# Patient Record
Sex: FEMALE | Race: WHITE | Employment: FULL TIME | URBAN - METROPOLITAN AREA
[De-identification: names, ages, dates, MRNs, and addresses within clinical notes are randomized per-mention and may not be internally consistent; named-entity substitution may affect disease eponyms.]

---

## 2017-01-05 ENCOUNTER — TELEPHONE (OUTPATIENT)
Dept: FAMILY MEDICINE CLINIC | Age: 42
End: 2017-01-05

## 2017-01-05 ENCOUNTER — OFFICE VISIT (OUTPATIENT)
Dept: FAMILY MEDICINE CLINIC | Age: 42
End: 2017-01-05

## 2017-01-05 VITALS
HEIGHT: 65 IN | DIASTOLIC BLOOD PRESSURE: 60 MMHG | HEART RATE: 64 BPM | WEIGHT: 231 LBS | BODY MASS INDEX: 38.49 KG/M2 | RESPIRATION RATE: 16 BRPM | SYSTOLIC BLOOD PRESSURE: 102 MMHG

## 2017-01-05 DIAGNOSIS — J20.9 ACUTE BRONCHITIS, UNSPECIFIED ORGANISM: ICD-10-CM

## 2017-01-05 DIAGNOSIS — J06.9 UPPER RESPIRATORY TRACT INFECTION, UNSPECIFIED TYPE: Primary | ICD-10-CM

## 2017-01-05 PROCEDURE — 99213 OFFICE O/P EST LOW 20 MIN: CPT | Performed by: FAMILY MEDICINE

## 2017-01-05 RX ORDER — BENZONATATE 100 MG/1
CAPSULE ORAL
Refills: 0 | COMMUNITY
Start: 2016-12-14 | End: 2017-04-07 | Stop reason: ALTCHOICE

## 2017-01-05 ASSESSMENT — ENCOUNTER SYMPTOMS
SINUS PRESSURE: 0
SHORTNESS OF BREATH: 1
COUGH: 1
DIARRHEA: 0
EYE DISCHARGE: 0
RHINORRHEA: 0
CONSTIPATION: 0
NAUSEA: 0

## 2017-01-31 DIAGNOSIS — E11.65 UNCONTROLLED TYPE 2 DIABETES MELLITUS WITH HYPERGLYCEMIA, WITHOUT LONG-TERM CURRENT USE OF INSULIN (HCC): ICD-10-CM

## 2017-02-03 RX ORDER — GLIMEPIRIDE 2 MG/1
2 TABLET ORAL EVERY MORNING
Qty: 30 TABLET | Refills: 3 | OUTPATIENT
Start: 2017-02-03

## 2017-04-05 ENCOUNTER — NURSE TRIAGE (OUTPATIENT)
Dept: ADMINISTRATIVE | Age: 42
End: 2017-04-05

## 2017-04-06 ENCOUNTER — TELEPHONE (OUTPATIENT)
Dept: FAMILY MEDICINE CLINIC | Age: 42
End: 2017-04-06

## 2017-04-07 ENCOUNTER — TELEPHONE (OUTPATIENT)
Dept: CARDIOLOGY | Age: 42
End: 2017-04-07

## 2017-04-07 ENCOUNTER — OFFICE VISIT (OUTPATIENT)
Dept: FAMILY MEDICINE CLINIC | Age: 42
End: 2017-04-07

## 2017-04-07 VITALS
HEIGHT: 65 IN | HEART RATE: 60 BPM | WEIGHT: 228.6 LBS | SYSTOLIC BLOOD PRESSURE: 106 MMHG | BODY MASS INDEX: 38.09 KG/M2 | DIASTOLIC BLOOD PRESSURE: 68 MMHG | RESPIRATION RATE: 16 BRPM

## 2017-04-07 DIAGNOSIS — R56.9 SEIZURES (HCC): ICD-10-CM

## 2017-04-07 DIAGNOSIS — E89.0 HYPOTHYROIDISM, POSTSURGICAL: ICD-10-CM

## 2017-04-07 DIAGNOSIS — R00.1 BRADYCARDIA: Primary | ICD-10-CM

## 2017-04-07 PROCEDURE — 93000 ELECTROCARDIOGRAM COMPLETE: CPT | Performed by: FAMILY MEDICINE

## 2017-04-07 PROCEDURE — 99215 OFFICE O/P EST HI 40 MIN: CPT | Performed by: FAMILY MEDICINE

## 2017-04-07 ASSESSMENT — ENCOUNTER SYMPTOMS
COUGH: 0
EYES NEGATIVE: 1
CONSTIPATION: 0
CHEST TIGHTNESS: 0
NAUSEA: 0
SHORTNESS OF BREATH: 0
DIARRHEA: 0
VOMITING: 0
ABDOMINAL PAIN: 0

## 2017-04-10 ENCOUNTER — OFFICE VISIT (OUTPATIENT)
Dept: CARDIOLOGY | Age: 42
End: 2017-04-10

## 2017-04-10 VITALS
SYSTOLIC BLOOD PRESSURE: 120 MMHG | DIASTOLIC BLOOD PRESSURE: 88 MMHG | HEART RATE: 72 BPM | WEIGHT: 224 LBS | HEIGHT: 65 IN | BODY MASS INDEX: 37.32 KG/M2

## 2017-04-10 DIAGNOSIS — R55 VASOVAGAL SYNCOPE: ICD-10-CM

## 2017-04-10 DIAGNOSIS — I49.1 PAC (PREMATURE ATRIAL CONTRACTION): Primary | ICD-10-CM

## 2017-04-10 PROCEDURE — 99213 OFFICE O/P EST LOW 20 MIN: CPT | Performed by: INTERNAL MEDICINE

## 2017-04-10 RX ORDER — NICOTINE 21 MG/24HR
1 PATCH, TRANSDERMAL 24 HOURS TRANSDERMAL EVERY 24 HOURS
Qty: 30 PATCH | Refills: 3 | Status: CANCELLED | OUTPATIENT
Start: 2017-04-10

## 2017-04-10 RX ORDER — NICOTINE 21 MG/24HR
1 PATCH, TRANSDERMAL 24 HOURS TRANSDERMAL EVERY 24 HOURS
COMMUNITY
End: 2017-04-17 | Stop reason: SDUPTHER

## 2017-04-17 ENCOUNTER — OFFICE VISIT (OUTPATIENT)
Dept: FAMILY MEDICINE CLINIC | Age: 42
End: 2017-04-17

## 2017-04-17 VITALS
WEIGHT: 227.5 LBS | DIASTOLIC BLOOD PRESSURE: 70 MMHG | HEART RATE: 64 BPM | SYSTOLIC BLOOD PRESSURE: 108 MMHG | BODY MASS INDEX: 37.9 KG/M2 | RESPIRATION RATE: 16 BRPM | HEIGHT: 65 IN

## 2017-04-17 DIAGNOSIS — F17.200 TOBACCO DEPENDENCE: Primary | ICD-10-CM

## 2017-04-17 PROCEDURE — 99213 OFFICE O/P EST LOW 20 MIN: CPT | Performed by: FAMILY MEDICINE

## 2017-04-17 RX ORDER — NICOTINE 21 MG/24HR
1 PATCH, TRANSDERMAL 24 HOURS TRANSDERMAL EVERY 24 HOURS
Qty: 30 PATCH | Refills: 0 | Status: SHIPPED | OUTPATIENT
Start: 2017-04-17 | End: 2017-08-03

## 2017-04-17 ASSESSMENT — ENCOUNTER SYMPTOMS
CONSTIPATION: 0
SINUS PRESSURE: 0
SHORTNESS OF BREATH: 0

## 2017-04-27 ENCOUNTER — TELEPHONE (OUTPATIENT)
Dept: FAMILY MEDICINE CLINIC | Age: 42
End: 2017-04-27

## 2017-04-27 ENCOUNTER — TELEPHONE (OUTPATIENT)
Dept: CARDIOLOGY | Age: 42
End: 2017-04-27

## 2017-05-01 ENCOUNTER — TELEPHONE (OUTPATIENT)
Dept: CARDIOLOGY | Age: 42
End: 2017-05-01

## 2017-05-04 ENCOUNTER — PROCEDURE VISIT (OUTPATIENT)
Dept: CARDIOLOGY | Age: 42
End: 2017-05-04

## 2017-05-04 DIAGNOSIS — Z45.09 ENCOUNTER FOR ELECTRONIC ANALYSIS OF REVEAL EVENT RECORDER: Primary | ICD-10-CM

## 2017-05-04 PROCEDURE — 93285 PRGRMG DEV EVAL SCRMS IP: CPT | Performed by: INTERNAL MEDICINE

## 2017-05-19 ENCOUNTER — OFFICE VISIT (OUTPATIENT)
Dept: FAMILY MEDICINE CLINIC | Age: 42
End: 2017-05-19

## 2017-05-19 VITALS
HEART RATE: 60 BPM | HEIGHT: 65 IN | WEIGHT: 227.5 LBS | DIASTOLIC BLOOD PRESSURE: 76 MMHG | BODY MASS INDEX: 37.9 KG/M2 | SYSTOLIC BLOOD PRESSURE: 122 MMHG | RESPIRATION RATE: 14 BRPM

## 2017-05-19 DIAGNOSIS — G89.29 CHRONIC PAIN OF LEFT KNEE: ICD-10-CM

## 2017-05-19 DIAGNOSIS — R07.89 COSTOCHONDRAL CHEST PAIN: Primary | ICD-10-CM

## 2017-05-19 DIAGNOSIS — M25.562 CHRONIC PAIN OF LEFT KNEE: ICD-10-CM

## 2017-05-19 PROCEDURE — 99214 OFFICE O/P EST MOD 30 MIN: CPT | Performed by: FAMILY MEDICINE

## 2017-05-19 ASSESSMENT — ENCOUNTER SYMPTOMS
SINUS PRESSURE: 0
CONSTIPATION: 0
SHORTNESS OF BREATH: 1

## 2017-06-08 ENCOUNTER — PROCEDURE VISIT (OUTPATIENT)
Dept: CARDIOLOGY | Age: 42
End: 2017-06-08

## 2017-06-08 DIAGNOSIS — Z45.09 ENCOUNTER FOR ELECTRONIC ANALYSIS OF REVEAL EVENT RECORDER: Primary | ICD-10-CM

## 2017-06-08 PROCEDURE — 93298 REM INTERROG DEV EVAL SCRMS: CPT | Performed by: INTERNAL MEDICINE

## 2017-06-13 ENCOUNTER — OFFICE VISIT (OUTPATIENT)
Dept: FAMILY MEDICINE CLINIC | Age: 42
End: 2017-06-13

## 2017-06-13 VITALS
HEIGHT: 65 IN | DIASTOLIC BLOOD PRESSURE: 66 MMHG | HEART RATE: 64 BPM | BODY MASS INDEX: 38.38 KG/M2 | RESPIRATION RATE: 12 BRPM | WEIGHT: 230.38 LBS | SYSTOLIC BLOOD PRESSURE: 112 MMHG

## 2017-06-13 DIAGNOSIS — R07.89 CHEST WALL PAIN: Primary | ICD-10-CM

## 2017-06-13 DIAGNOSIS — I87.2 VENOUS INSUFFICIENCY: ICD-10-CM

## 2017-06-13 PROCEDURE — 99213 OFFICE O/P EST LOW 20 MIN: CPT | Performed by: FAMILY MEDICINE

## 2017-06-13 RX ORDER — IBUPROFEN 800 MG/1
TABLET ORAL
Refills: 0 | COMMUNITY
Start: 2017-05-30 | End: 2017-08-29 | Stop reason: SDUPTHER

## 2017-06-13 RX ORDER — PSYLLIUM HUSK 3.4 G/7G
POWDER ORAL
Refills: 0 | COMMUNITY
Start: 2017-05-30 | End: 2020-02-14

## 2017-06-13 RX ORDER — LIDOCAINE 50 MG/G
OINTMENT TOPICAL
Qty: 35 G | Refills: 1 | Status: SHIPPED | OUTPATIENT
Start: 2017-06-13 | End: 2017-08-14

## 2017-06-13 ASSESSMENT — ENCOUNTER SYMPTOMS
CHEST TIGHTNESS: 1
CONSTIPATION: 1
SHORTNESS OF BREATH: 0
SINUS PRESSURE: 0

## 2017-08-01 ENCOUNTER — PROCEDURE VISIT (OUTPATIENT)
Dept: CARDIOLOGY CLINIC | Age: 42
End: 2017-08-01
Payer: COMMERCIAL

## 2017-08-01 DIAGNOSIS — Z45.09 ENCOUNTER FOR ELECTRONIC ANALYSIS OF REVEAL EVENT RECORDER: Primary | ICD-10-CM

## 2017-08-01 PROCEDURE — 93298 REM INTERROG DEV EVAL SCRMS: CPT | Performed by: INTERNAL MEDICINE

## 2017-08-03 ENCOUNTER — TELEPHONE (OUTPATIENT)
Dept: FAMILY MEDICINE CLINIC | Age: 42
End: 2017-08-03

## 2017-08-03 ENCOUNTER — OFFICE VISIT (OUTPATIENT)
Dept: FAMILY MEDICINE CLINIC | Age: 42
End: 2017-08-03

## 2017-08-03 VITALS
HEIGHT: 65 IN | HEART RATE: 60 BPM | DIASTOLIC BLOOD PRESSURE: 64 MMHG | WEIGHT: 235 LBS | RESPIRATION RATE: 14 BRPM | BODY MASS INDEX: 39.15 KG/M2 | SYSTOLIC BLOOD PRESSURE: 112 MMHG

## 2017-08-03 DIAGNOSIS — K59.09 CHRONIC CONSTIPATION: ICD-10-CM

## 2017-08-03 DIAGNOSIS — R07.89 CHEST WALL PAIN: Primary | ICD-10-CM

## 2017-08-03 PROCEDURE — 99214 OFFICE O/P EST MOD 30 MIN: CPT | Performed by: FAMILY MEDICINE

## 2017-08-03 RX ORDER — METRONIDAZOLE 500 MG/1
TABLET ORAL
Refills: 0 | COMMUNITY
Start: 2017-08-03 | End: 2017-08-09

## 2017-08-03 ASSESSMENT — ENCOUNTER SYMPTOMS
VOMITING: 1
CONSTIPATION: 1
SHORTNESS OF BREATH: 1
SINUS PRESSURE: 0
CHEST TIGHTNESS: 1

## 2017-08-10 ENCOUNTER — TELEPHONE (OUTPATIENT)
Dept: FAMILY MEDICINE CLINIC | Age: 42
End: 2017-08-10

## 2017-08-10 ENCOUNTER — HOSPITAL ENCOUNTER (OUTPATIENT)
Age: 42
Discharge: HOME | End: 2017-08-10
Payer: COMMERCIAL

## 2017-08-10 ENCOUNTER — HOSPITAL ENCOUNTER (OUTPATIENT)
Dept: WOMENS IMAGING | Age: 42
Discharge: HOME | End: 2017-08-10
Payer: COMMERCIAL

## 2017-08-10 ENCOUNTER — HOSPITAL ENCOUNTER (OUTPATIENT)
Dept: GENERAL RADIOLOGY | Age: 42
Discharge: HOME | End: 2017-08-10
Payer: COMMERCIAL

## 2017-08-10 DIAGNOSIS — K59.09 CHRONIC CONSTIPATION: ICD-10-CM

## 2017-08-10 DIAGNOSIS — Z12.31 VISIT FOR SCREENING MAMMOGRAM: ICD-10-CM

## 2017-08-10 LAB
AMORPHOUS: ABNORMAL
BACTERIA: ABNORMAL
BILIRUBIN URINE: NEGATIVE
BLOOD, URINE: NEGATIVE
CASTS: ABNORMAL /LPF
CHARACTER, URINE: ABNORMAL
COLOR: YELLOW
CRYSTALS: ABNORMAL
EPITHELIAL CELLS, UA: ABNORMAL /HPF
GLUCOSE, URINE: >= 1000 MG/DL
HEPATITIS B SURFACE ANTIGEN: NEGATIVE
HEPATITIS C ANTIBODY: NEGATIVE
KETONES, URINE: NEGATIVE
LEUKOCYTE ESTERASE, URINE: NEGATIVE
MISCELLANEOUS LAB TEST RESULT: ABNORMAL
MISCELLANEOUS LAB TEST RESULT: ABNORMAL
MUCUS: ABNORMAL
NITRITE, URINE: NEGATIVE
PH UA: 6
PROTEIN UA: NEGATIVE MG/DL
RBC URINE: ABNORMAL /HPF
RENAL EPITHELIAL, UA: ABNORMAL
RPR: NONREACTIVE
SPECIFIC GRAVITY UA: 1.03 (ref 1–1.03)
T4 FREE: 0.76 NG/DL (ref 0.93–1.76)
TSH SERPL DL<=0.05 MIU/L-ACNC: 51.95 UIU/ML (ref 0.4–4.2)
UROBILINOGEN, URINE: 1 EU/DL
WBC UA: ABNORMAL /HPF
YEAST: ABNORMAL

## 2017-08-10 PROCEDURE — 81001 URINALYSIS AUTO W/SCOPE: CPT

## 2017-08-10 PROCEDURE — 84443 ASSAY THYROID STIM HORMONE: CPT

## 2017-08-10 PROCEDURE — 86803 HEPATITIS C AB TEST: CPT

## 2017-08-10 PROCEDURE — 36415 COLL VENOUS BLD VENIPUNCTURE: CPT

## 2017-08-10 PROCEDURE — 86694 HERPES SIMPLEX NES ANTBDY: CPT

## 2017-08-10 PROCEDURE — 74000 XR ABDOMEN LIMITED (KUB): CPT

## 2017-08-10 PROCEDURE — 86695 HERPES SIMPLEX TYPE 1 TEST: CPT

## 2017-08-10 PROCEDURE — 86592 SYPHILIS TEST NON-TREP QUAL: CPT

## 2017-08-10 PROCEDURE — 84439 ASSAY OF FREE THYROXINE: CPT

## 2017-08-10 PROCEDURE — 87340 HEPATITIS B SURFACE AG IA: CPT

## 2017-08-10 PROCEDURE — 77063 BREAST TOMOSYNTHESIS BI: CPT

## 2017-08-10 PROCEDURE — 86696 HERPES SIMPLEX TYPE 2 TEST: CPT

## 2017-08-11 ENCOUNTER — TELEPHONE (OUTPATIENT)
Dept: ENDOCRINOLOGY | Age: 42
End: 2017-08-11

## 2017-08-11 ENCOUNTER — TELEPHONE (OUTPATIENT)
Dept: FAMILY MEDICINE CLINIC | Age: 42
End: 2017-08-11

## 2017-08-11 LAB
HSV 1 GLYCOPROTEIN G AB IGG: 33.3 IV
HSV TYPE 2 GLYCOPROTEIN G-SPECIFIC AB, IGG: 17.5 IV

## 2017-08-11 RX ORDER — FLUCONAZOLE 150 MG/1
150 TABLET ORAL ONCE
Qty: 1 TABLET | Refills: 0 | Status: SHIPPED | OUTPATIENT
Start: 2017-08-11 | End: 2017-08-11

## 2017-08-12 LAB — HERPES TYPE 1/2 IGM COMBINED: 0.55 IV

## 2017-08-14 ENCOUNTER — OFFICE VISIT (OUTPATIENT)
Dept: ENDOCRINOLOGY | Age: 42
End: 2017-08-14
Payer: COMMERCIAL

## 2017-08-14 VITALS
HEIGHT: 65 IN | RESPIRATION RATE: 18 BRPM | DIASTOLIC BLOOD PRESSURE: 79 MMHG | WEIGHT: 228.5 LBS | HEART RATE: 71 BPM | BODY MASS INDEX: 38.07 KG/M2 | SYSTOLIC BLOOD PRESSURE: 120 MMHG

## 2017-08-14 DIAGNOSIS — E89.0 HYPOTHYROIDISM, POSTABLATIVE: ICD-10-CM

## 2017-08-14 DIAGNOSIS — C73 FOLLICULAR THYROID CANCER (HCC): Primary | ICD-10-CM

## 2017-08-14 DIAGNOSIS — E78.00 PURE HYPERCHOLESTEROLEMIA: ICD-10-CM

## 2017-08-14 DIAGNOSIS — E11.65 UNCONTROLLED TYPE 2 DIABETES MELLITUS WITH HYPERGLYCEMIA, WITHOUT LONG-TERM CURRENT USE OF INSULIN (HCC): ICD-10-CM

## 2017-08-14 PROCEDURE — 99215 OFFICE O/P EST HI 40 MIN: CPT | Performed by: INTERNAL MEDICINE

## 2017-08-14 RX ORDER — LEVOTHYROXINE SODIUM 0.03 MG/1
25 TABLET ORAL DAILY
Qty: 30 TABLET | Refills: 5 | Status: SHIPPED | OUTPATIENT
Start: 2017-08-14 | End: 2017-10-26 | Stop reason: DRUGHIGH

## 2017-08-14 RX ORDER — GLIMEPIRIDE 1 MG/1
1 TABLET ORAL 2 TIMES DAILY
Qty: 30 TABLET | Refills: 5 | Status: SHIPPED | OUTPATIENT
Start: 2017-08-14 | End: 2017-11-03

## 2017-08-14 RX ORDER — LEVOTHYROXINE SODIUM 0.12 MG/1
TABLET ORAL
Qty: 60 TABLET | Refills: 5 | Status: SHIPPED | OUTPATIENT
Start: 2017-08-14 | End: 2017-10-26 | Stop reason: SDUPTHER

## 2017-08-14 RX ORDER — CANAGLIFLOZIN 100 MG/1
TABLET, FILM COATED ORAL
Qty: 30 TABLET | Refills: 5 | Status: SHIPPED | OUTPATIENT
Start: 2017-08-14 | End: 2017-11-03

## 2017-08-14 ASSESSMENT — ENCOUNTER SYMPTOMS: BLURRED VISION: 1

## 2017-08-15 ENCOUNTER — OFFICE VISIT (OUTPATIENT)
Dept: PHYSICAL MEDICINE AND REHAB | Age: 42
End: 2017-08-15
Payer: COMMERCIAL

## 2017-08-15 VITALS
SYSTOLIC BLOOD PRESSURE: 115 MMHG | DIASTOLIC BLOOD PRESSURE: 77 MMHG | HEART RATE: 73 BPM | HEIGHT: 65 IN | BODY MASS INDEX: 38.69 KG/M2 | WEIGHT: 232.2 LBS

## 2017-08-15 DIAGNOSIS — G89.4 CHRONIC PAIN SYNDROME: ICD-10-CM

## 2017-08-15 DIAGNOSIS — R07.89 MID STERNAL CHEST PAIN: ICD-10-CM

## 2017-08-15 DIAGNOSIS — R07.89 COSTOCHONDRAL CHEST PAIN: Primary | ICD-10-CM

## 2017-08-15 DIAGNOSIS — M79.7 FIBROMYALGIA: ICD-10-CM

## 2017-08-15 DIAGNOSIS — R52 GENERALIZED PAIN: ICD-10-CM

## 2017-08-15 PROCEDURE — 99205 OFFICE O/P NEW HI 60 MIN: CPT | Performed by: NURSE PRACTITIONER

## 2017-08-15 ASSESSMENT — ENCOUNTER SYMPTOMS
SINUS PRESSURE: 0
COLOR CHANGE: 0
SORE THROAT: 0
BACK PAIN: 1
VOMITING: 0
CHEST TIGHTNESS: 0
ABDOMINAL PAIN: 0
EYE PAIN: 0
PHOTOPHOBIA: 0
CONSTIPATION: 0
DIARRHEA: 0
WHEEZING: 0
COUGH: 0
SHORTNESS OF BREATH: 0
NAUSEA: 0
RHINORRHEA: 0

## 2017-08-22 ENCOUNTER — TELEPHONE (OUTPATIENT)
Dept: PHARMACY | Age: 42
End: 2017-08-22

## 2017-08-23 ENCOUNTER — HOSPITAL ENCOUNTER (OUTPATIENT)
Age: 42
Discharge: HOME | End: 2017-08-23
Payer: COMMERCIAL

## 2017-08-23 ENCOUNTER — HOSPITAL ENCOUNTER (OUTPATIENT)
Dept: ULTRASOUND IMAGING | Age: 42
Discharge: HOME | End: 2017-08-23
Payer: COMMERCIAL

## 2017-08-23 ENCOUNTER — TELEPHONE (OUTPATIENT)
Dept: FAMILY MEDICINE CLINIC | Age: 42
End: 2017-08-23

## 2017-08-23 DIAGNOSIS — E78.00 PURE HYPERCHOLESTEROLEMIA: ICD-10-CM

## 2017-08-23 DIAGNOSIS — C73 FOLLICULAR THYROID CANCER (HCC): ICD-10-CM

## 2017-08-23 DIAGNOSIS — E11.65 UNCONTROLLED TYPE 2 DIABETES MELLITUS WITH HYPERGLYCEMIA, WITHOUT LONG-TERM CURRENT USE OF INSULIN (HCC): ICD-10-CM

## 2017-08-23 LAB
AVERAGE GLUCOSE: 204 MG/DL (ref 70–126)
CHOLESTEROL, TOTAL: 111 MG/DL (ref 100–199)
CREATININE, URINE: 152 MG/DL
HBA1C MFR BLD: 8.8 % (ref 4.4–6.4)
HDLC SERPL-MCNC: 48 MG/DL
LDL CHOLESTEROL CALCULATED: 52 MG/DL
MICROALBUMIN UR-MCNC: 4.77 MG/DL
MICROALBUMIN/CREAT UR-RTO: 31 MG/G (ref 0–30)
TRIGL SERPL-MCNC: 56 MG/DL (ref 0–199)

## 2017-08-23 PROCEDURE — 76536 US EXAM OF HEAD AND NECK: CPT

## 2017-08-23 PROCEDURE — 82043 UR ALBUMIN QUANTITATIVE: CPT

## 2017-08-23 PROCEDURE — 36415 COLL VENOUS BLD VENIPUNCTURE: CPT

## 2017-08-23 PROCEDURE — 80061 LIPID PANEL: CPT

## 2017-08-23 PROCEDURE — 83036 HEMOGLOBIN GLYCOSYLATED A1C: CPT

## 2017-08-28 ENCOUNTER — HOSPITAL ENCOUNTER (EMERGENCY)
Age: 42
Discharge: HOME | End: 2017-08-28
Payer: COMMERCIAL

## 2017-08-28 ENCOUNTER — APPOINTMENT (OUTPATIENT)
Dept: GENERAL RADIOLOGY | Age: 42
End: 2017-08-28
Payer: COMMERCIAL

## 2017-08-28 VITALS
HEART RATE: 73 BPM | HEIGHT: 65 IN | DIASTOLIC BLOOD PRESSURE: 82 MMHG | RESPIRATION RATE: 16 BRPM | TEMPERATURE: 97.9 F | WEIGHT: 225 LBS | OXYGEN SATURATION: 97 % | SYSTOLIC BLOOD PRESSURE: 109 MMHG | BODY MASS INDEX: 37.49 KG/M2

## 2017-08-28 DIAGNOSIS — R07.89 ATYPICAL CHEST PAIN: Primary | ICD-10-CM

## 2017-08-28 LAB
AMPHETAMINE+METHAMPHETAMINE URINE SCREEN: NEGATIVE
ANION GAP SERPL CALCULATED.3IONS-SCNC: 11 MEQ/L (ref 8–16)
BACTERIA: ABNORMAL
BARBITURATE QUANTITATIVE URINE: NEGATIVE
BASOPHILS # BLD: 0.4 %
BASOPHILS ABSOLUTE: 0 THOU/MM3 (ref 0–0.1)
BENZODIAZEPINE QUANTITATIVE URINE: NEGATIVE
BILIRUBIN URINE: NEGATIVE
BLOOD, URINE: NEGATIVE
BUN BLDV-MCNC: 12 MG/DL (ref 7–22)
CALCIUM SERPL-MCNC: 9.6 MG/DL (ref 8.5–10.5)
CANNABINOID QUANTITATIVE URINE: POSITIVE
CASTS: ABNORMAL /LPF
CHARACTER, URINE: CLEAR
CHLORIDE BLD-SCNC: 102 MEQ/L (ref 98–111)
CO2: 26 MEQ/L (ref 23–33)
COCAINE METABOLITE QUANTITATIVE URINE: NEGATIVE
COLOR: YELLOW
CREAT SERPL-MCNC: 0.5 MG/DL (ref 0.4–1.2)
CRYSTALS: ABNORMAL
EOSINOPHIL # BLD: 0.9 %
EOSINOPHILS ABSOLUTE: 0.1 THOU/MM3 (ref 0–0.4)
EPITHELIAL CELLS, UA: ABNORMAL /HPF
GFR SERPL CREATININE-BSD FRML MDRD: > 90 ML/MIN/1.73M2
GLUCOSE BLD-MCNC: 213 MG/DL (ref 70–108)
GLUCOSE, URINE: >= 1000 MG/DL
HCT VFR BLD CALC: 43.9 % (ref 37–47)
HEMOGLOBIN: 15.4 GM/DL (ref 12–16)
KETONES, URINE: NEGATIVE
LEUKOCYTE ESTERASE, URINE: NEGATIVE
LYMPHOCYTES # BLD: 35.3 %
LYMPHOCYTES ABSOLUTE: 2.8 THOU/MM3 (ref 1–4.8)
MCH RBC QN AUTO: 32.8 PG (ref 27–31)
MCHC RBC AUTO-ENTMCNC: 35 GM/DL (ref 33–37)
MCV RBC AUTO: 93.9 FL (ref 81–99)
MONOCYTES # BLD: 5.7 %
MONOCYTES ABSOLUTE: 0.4 THOU/MM3 (ref 0.4–1.3)
NITRITE, URINE: NEGATIVE
NUCLEATED RED BLOOD CELLS: 0 /100 WBC
OPIATES, URINE: NEGATIVE
OSMOLALITY CALCULATION: 283.7 MOSMOL/KG (ref 275–300)
OXYCODONE: NEGATIVE
PDW BLD-RTO: 14 % (ref 11.5–14.5)
PH UA: 7
PHENCYCLIDINE QUANTITATIVE URINE: NEGATIVE
PLATELET # BLD: 234 THOU/MM3 (ref 130–400)
PMV BLD AUTO: 8.4 MCM (ref 7.4–10.4)
POTASSIUM SERPL-SCNC: 4.1 MEQ/L (ref 3.5–5.2)
PRO-BNP: 146.6 PG/ML (ref 0–450)
PROTEIN UA: NEGATIVE MG/DL
RBC # BLD: 4.68 MILL/MM3 (ref 4.2–5.4)
RBC # BLD: NORMAL 10*6/UL
RBC URINE: ABNORMAL /HPF
SEG NEUTROPHILS: 57.7 %
SEGMENTED NEUTROPHILS ABSOLUTE COUNT: 4.5 THOU/MM3 (ref 1.8–7.7)
SODIUM BLD-SCNC: 139 MEQ/L (ref 135–145)
SPECIFIC GRAVITY UA: > 1.03 (ref 1–1.03)
TROPONIN T: < 0.01 NG/ML
UROBILINOGEN, URINE: 1 EU/DL
WBC # BLD: 7.8 THOU/MM3 (ref 4.8–10.8)
WBC UA: ABNORMAL /HPF

## 2017-08-28 PROCEDURE — 71020 XR CHEST STANDARD TWO VW: CPT

## 2017-08-28 PROCEDURE — 81001 URINALYSIS AUTO W/SCOPE: CPT

## 2017-08-28 PROCEDURE — 84484 ASSAY OF TROPONIN QUANT: CPT

## 2017-08-28 PROCEDURE — 80307 DRUG TEST PRSMV CHEM ANLYZR: CPT

## 2017-08-28 PROCEDURE — 93005 ELECTROCARDIOGRAM TRACING: CPT

## 2017-08-28 PROCEDURE — 36415 COLL VENOUS BLD VENIPUNCTURE: CPT

## 2017-08-28 PROCEDURE — 80048 BASIC METABOLIC PNL TOTAL CA: CPT

## 2017-08-28 PROCEDURE — 99285 EMERGENCY DEPT VISIT HI MDM: CPT

## 2017-08-28 PROCEDURE — 85025 COMPLETE CBC W/AUTO DIFF WBC: CPT

## 2017-08-28 PROCEDURE — 83880 ASSAY OF NATRIURETIC PEPTIDE: CPT

## 2017-08-28 ASSESSMENT — PAIN DESCRIPTION - LOCATION: LOCATION: CHEST

## 2017-08-28 ASSESSMENT — PAIN DESCRIPTION - PAIN TYPE: TYPE: ACUTE PAIN

## 2017-08-28 ASSESSMENT — PAIN DESCRIPTION - DESCRIPTORS: DESCRIPTORS: SHARP;STABBING

## 2017-08-28 ASSESSMENT — PAIN DESCRIPTION - FREQUENCY: FREQUENCY: CONTINUOUS

## 2017-08-28 ASSESSMENT — ENCOUNTER SYMPTOMS
BACK PAIN: 0
NAUSEA: 0
DIARRHEA: 0
EYE REDNESS: 0
VOICE CHANGE: 0
SINUS PRESSURE: 0
CONSTIPATION: 0
EYE PAIN: 0
WHEEZING: 0
SHORTNESS OF BREATH: 1
BLOOD IN STOOL: 0
RHINORRHEA: 0
TROUBLE SWALLOWING: 0
VOMITING: 0
ABDOMINAL PAIN: 0
PHOTOPHOBIA: 0
EYE ITCHING: 0
CHEST TIGHTNESS: 0
COUGH: 0
SORE THROAT: 0
CHOKING: 0
ABDOMINAL DISTENTION: 0
EYE DISCHARGE: 0

## 2017-08-28 ASSESSMENT — PAIN DESCRIPTION - ORIENTATION: ORIENTATION: LEFT

## 2017-08-28 ASSESSMENT — PAIN SCALES - GENERAL: PAINLEVEL_OUTOF10: 7

## 2017-08-29 ENCOUNTER — OFFICE VISIT (OUTPATIENT)
Dept: PHYSICAL MEDICINE AND REHAB | Age: 42
End: 2017-08-29
Payer: COMMERCIAL

## 2017-08-29 VITALS
WEIGHT: 225.09 LBS | BODY MASS INDEX: 37.5 KG/M2 | SYSTOLIC BLOOD PRESSURE: 131 MMHG | DIASTOLIC BLOOD PRESSURE: 95 MMHG | HEART RATE: 76 BPM | HEIGHT: 65 IN

## 2017-08-29 DIAGNOSIS — F41.9 ANXIETY: ICD-10-CM

## 2017-08-29 DIAGNOSIS — F32.A DEPRESSION, UNSPECIFIED DEPRESSION TYPE: ICD-10-CM

## 2017-08-29 DIAGNOSIS — G89.4 CHRONIC PAIN SYNDROME: ICD-10-CM

## 2017-08-29 DIAGNOSIS — R52 GENERALIZED PAIN: ICD-10-CM

## 2017-08-29 DIAGNOSIS — R07.89 MID STERNAL CHEST PAIN: ICD-10-CM

## 2017-08-29 DIAGNOSIS — R07.89 COSTOCHONDRAL CHEST PAIN: Primary | ICD-10-CM

## 2017-08-29 DIAGNOSIS — M79.7 FIBROMYALGIA: ICD-10-CM

## 2017-08-29 LAB
EKG ATRIAL RATE: 75 BPM
EKG P AXIS: 46 DEGREES
EKG P-R INTERVAL: 136 MS
EKG Q-T INTERVAL: 402 MS
EKG QRS DURATION: 96 MS
EKG QTC CALCULATION (BAZETT): 448 MS
EKG R AXIS: 56 DEGREES
EKG T AXIS: 47 DEGREES
EKG VENTRICULAR RATE: 75 BPM

## 2017-08-29 PROCEDURE — 99213 OFFICE O/P EST LOW 20 MIN: CPT | Performed by: NURSE PRACTITIONER

## 2017-08-29 RX ORDER — LIDOCAINE 50 MG/G
1 PATCH TOPICAL DAILY
Qty: 30 PATCH | Refills: 0 | Status: SHIPPED | OUTPATIENT
Start: 2017-08-29 | End: 2018-01-03

## 2017-08-29 RX ORDER — GABAPENTIN 300 MG/1
300 CAPSULE ORAL NIGHTLY
Qty: 30 CAPSULE | Refills: 0 | Status: SHIPPED | OUTPATIENT
Start: 2017-08-29 | End: 2018-06-21 | Stop reason: ALTCHOICE

## 2017-08-29 RX ORDER — IBUPROFEN 800 MG/1
800 TABLET ORAL EVERY 8 HOURS PRN
Qty: 90 TABLET | Refills: 0 | Status: SHIPPED | OUTPATIENT
Start: 2017-08-29 | End: 2018-01-03

## 2017-08-29 ASSESSMENT — ENCOUNTER SYMPTOMS: BACK PAIN: 1

## 2017-08-30 ENCOUNTER — HOSPITAL ENCOUNTER (OUTPATIENT)
Dept: NUCLEAR MEDICINE | Age: 42
Discharge: HOME | End: 2017-08-30
Payer: COMMERCIAL

## 2017-08-30 ENCOUNTER — HOSPITAL ENCOUNTER (OUTPATIENT)
Dept: CT IMAGING | Age: 42
Discharge: HOME | End: 2017-08-30
Payer: COMMERCIAL

## 2017-08-30 ENCOUNTER — HOSPITAL ENCOUNTER (OUTPATIENT)
Age: 42
Discharge: HOME | End: 2017-08-30
Payer: COMMERCIAL

## 2017-08-30 ENCOUNTER — TELEPHONE (OUTPATIENT)
Dept: FAMILY MEDICINE CLINIC | Age: 42
End: 2017-08-30

## 2017-08-30 ENCOUNTER — TELEPHONE (OUTPATIENT)
Dept: PHYSICAL MEDICINE AND REHAB | Age: 42
End: 2017-08-30

## 2017-08-30 DIAGNOSIS — R07.89 CHEST WALL PAIN: ICD-10-CM

## 2017-08-30 PROCEDURE — A9503 TC99M MEDRONATE: HCPCS | Performed by: FAMILY MEDICINE

## 2017-08-30 PROCEDURE — 78306 BONE IMAGING WHOLE BODY: CPT

## 2017-08-30 PROCEDURE — 3430000000 HC RX DIAGNOSTIC RADIOPHARMACEUTICAL: Performed by: FAMILY MEDICINE

## 2017-08-30 PROCEDURE — 71250 CT THORAX DX C-: CPT

## 2017-08-30 RX ORDER — TC 99M MEDRONATE 20 MG/10ML
25 INJECTION, POWDER, LYOPHILIZED, FOR SOLUTION INTRAVENOUS
Status: COMPLETED | OUTPATIENT
Start: 2017-08-30 | End: 2017-08-30

## 2017-08-30 RX ADMIN — Medication 22.8 MILLICURIE: at 11:20

## 2017-09-01 RX ORDER — TIZANIDINE 2 MG/1
2 TABLET ORAL 3 TIMES DAILY PRN
Qty: 90 TABLET | Refills: 0 | Status: SHIPPED | OUTPATIENT
Start: 2017-09-01 | End: 2018-01-09 | Stop reason: ALTCHOICE

## 2017-09-08 ENCOUNTER — PROCEDURE VISIT (OUTPATIENT)
Dept: CARDIOLOGY CLINIC | Age: 42
End: 2017-09-08
Payer: COMMERCIAL

## 2017-09-08 ENCOUNTER — HOSPITAL ENCOUNTER (OUTPATIENT)
Age: 42
Discharge: HOME | End: 2017-09-08
Payer: COMMERCIAL

## 2017-09-08 DIAGNOSIS — E89.0 HYPOTHYROIDISM, POSTABLATIVE: ICD-10-CM

## 2017-09-08 DIAGNOSIS — C73 FOLLICULAR THYROID CANCER (HCC): ICD-10-CM

## 2017-09-08 DIAGNOSIS — Z45.09 ENCOUNTER FOR ELECTRONIC ANALYSIS OF REVEAL EVENT RECORDER: Primary | ICD-10-CM

## 2017-09-08 LAB
T4 FREE: 2.54 NG/DL (ref 0.93–1.76)
TSH SERPL DL<=0.05 MIU/L-ACNC: 0.1 UIU/ML (ref 0.4–4.2)

## 2017-09-08 PROCEDURE — 84439 ASSAY OF FREE THYROXINE: CPT

## 2017-09-08 PROCEDURE — 84443 ASSAY THYROID STIM HORMONE: CPT

## 2017-09-08 PROCEDURE — 84432 ASSAY OF THYROGLOBULIN: CPT

## 2017-09-08 PROCEDURE — 36415 COLL VENOUS BLD VENIPUNCTURE: CPT

## 2017-09-08 PROCEDURE — 86800 THYROGLOBULIN ANTIBODY: CPT

## 2017-09-08 PROCEDURE — 93298 REM INTERROG DEV EVAL SCRMS: CPT | Performed by: INTERNAL MEDICINE

## 2017-09-09 LAB — THYROGLOBULIN: NORMAL

## 2017-09-12 ENCOUNTER — OFFICE VISIT (OUTPATIENT)
Dept: CARDIOLOGY CLINIC | Age: 42
End: 2017-09-12
Payer: COMMERCIAL

## 2017-09-12 VITALS
SYSTOLIC BLOOD PRESSURE: 118 MMHG | BODY MASS INDEX: 37.65 KG/M2 | WEIGHT: 226 LBS | HEIGHT: 65 IN | HEART RATE: 60 BPM | DIASTOLIC BLOOD PRESSURE: 62 MMHG

## 2017-09-12 DIAGNOSIS — M19.90 ARTHRITIS: ICD-10-CM

## 2017-09-12 DIAGNOSIS — E78.00 PURE HYPERCHOLESTEROLEMIA: Primary | ICD-10-CM

## 2017-09-12 DIAGNOSIS — R55 VASOVAGAL SYNCOPE: ICD-10-CM

## 2017-09-12 PROCEDURE — 99213 OFFICE O/P EST LOW 20 MIN: CPT | Performed by: INTERNAL MEDICINE

## 2017-09-12 RX ORDER — POTASSIUM CHLORIDE 20 MEQ/1
20 TABLET, EXTENDED RELEASE ORAL DAILY
COMMUNITY
End: 2017-09-12 | Stop reason: SDUPTHER

## 2017-09-12 RX ORDER — BUMETANIDE 1 MG/1
1 TABLET ORAL DAILY
Qty: 30 TABLET | Refills: 2 | Status: SHIPPED | OUTPATIENT
Start: 2017-09-12 | End: 2017-10-25

## 2017-09-12 RX ORDER — POTASSIUM CHLORIDE 20 MEQ/1
20 TABLET, EXTENDED RELEASE ORAL DAILY
Qty: 30 TABLET | Refills: 2 | Status: SHIPPED | OUTPATIENT
Start: 2017-09-12 | End: 2017-10-25

## 2017-09-12 RX ORDER — BUMETANIDE 1 MG/1
1 TABLET ORAL DAILY
COMMUNITY
End: 2017-09-12 | Stop reason: SDUPTHER

## 2017-10-02 ENCOUNTER — TELEPHONE (OUTPATIENT)
Dept: CARDIOLOGY CLINIC | Age: 42
End: 2017-10-02

## 2017-10-11 ENCOUNTER — TELEPHONE (OUTPATIENT)
Dept: CARDIOLOGY CLINIC | Age: 42
End: 2017-10-11

## 2017-10-11 ENCOUNTER — PROCEDURE VISIT (OUTPATIENT)
Dept: CARDIOLOGY CLINIC | Age: 42
End: 2017-10-11

## 2017-10-11 ENCOUNTER — PROCEDURE VISIT (OUTPATIENT)
Dept: CARDIOLOGY CLINIC | Age: 42
End: 2017-10-11
Payer: COMMERCIAL

## 2017-10-11 DIAGNOSIS — R00.1 BRADYCARDIA: ICD-10-CM

## 2017-10-11 DIAGNOSIS — I73.9 CLAUDICATION (HCC): Primary | ICD-10-CM

## 2017-10-11 DIAGNOSIS — E89.0 HYPOTHYROIDISM, POSTABLATIVE: ICD-10-CM

## 2017-10-11 DIAGNOSIS — I49.1 PAC (PREMATURE ATRIAL CONTRACTION): ICD-10-CM

## 2017-10-11 DIAGNOSIS — E78.00 PURE HYPERCHOLESTEROLEMIA: Primary | ICD-10-CM

## 2017-10-11 DIAGNOSIS — Z45.09 ENCOUNTER FOR ELECTRONIC ANALYSIS OF REVEAL EVENT RECORDER: Primary | ICD-10-CM

## 2017-10-11 PROCEDURE — 93298 REM INTERROG DEV EVAL SCRMS: CPT | Performed by: INTERNAL MEDICINE

## 2017-10-11 NOTE — PROGRESS NOTES
REVEAL BATTERY OK  1 SYMPTOM EPISODE ON 09/23/17  APPEARS TO BE SINUS WITH PAC'S AND PVC'S   NO EPISODES MARKED

## 2017-10-13 ENCOUNTER — HOSPITAL ENCOUNTER (OUTPATIENT)
Age: 42
Discharge: HOME | End: 2017-10-13
Payer: COMMERCIAL

## 2017-10-13 ENCOUNTER — TELEPHONE (OUTPATIENT)
Dept: CARDIOLOGY CLINIC | Age: 42
End: 2017-10-13

## 2017-10-13 DIAGNOSIS — E89.0 HYPOTHYROIDISM, POSTABLATIVE: ICD-10-CM

## 2017-10-13 DIAGNOSIS — M79.604 LEG PAIN, BILATERAL: ICD-10-CM

## 2017-10-13 DIAGNOSIS — I49.1 PAC (PREMATURE ATRIAL CONTRACTION): ICD-10-CM

## 2017-10-13 DIAGNOSIS — E78.00 PURE HYPERCHOLESTEROLEMIA: ICD-10-CM

## 2017-10-13 DIAGNOSIS — M79.605 LEG PAIN, BILATERAL: ICD-10-CM

## 2017-10-13 DIAGNOSIS — R00.1 BRADYCARDIA: ICD-10-CM

## 2017-10-13 DIAGNOSIS — M79.89 LEFT LEG SWELLING: Primary | ICD-10-CM

## 2017-10-13 LAB
ANION GAP SERPL CALCULATED.3IONS-SCNC: 13 MEQ/L (ref 8–16)
BUN BLDV-MCNC: 12 MG/DL (ref 7–22)
CALCIUM SERPL-MCNC: 9.8 MG/DL (ref 8.5–10.5)
CHLORIDE BLD-SCNC: 96 MEQ/L (ref 98–111)
CO2: 30 MEQ/L (ref 23–33)
CREAT SERPL-MCNC: 0.6 MG/DL (ref 0.4–1.2)
GFR SERPL CREATININE-BSD FRML MDRD: > 90 ML/MIN/1.73M2
GLUCOSE BLD-MCNC: 200 MG/DL (ref 70–108)
MAGNESIUM: 1.6 MG/DL (ref 1.6–2.4)
POTASSIUM SERPL-SCNC: 4.4 MEQ/L (ref 3.5–5.2)
SODIUM BLD-SCNC: 139 MEQ/L (ref 135–145)

## 2017-10-13 PROCEDURE — 83735 ASSAY OF MAGNESIUM: CPT

## 2017-10-13 PROCEDURE — 80048 BASIC METABOLIC PNL TOTAL CA: CPT

## 2017-10-13 PROCEDURE — 36415 COLL VENOUS BLD VENIPUNCTURE: CPT

## 2017-10-16 NOTE — TELEPHONE ENCOUNTER
Pt got her bmp and mg on Friday    10/13/2017  4:08 PM - Dick, [facility] Incoming Lab Results From [facility]     Component Results     Component Value Ref Range & Units Status Collected Lab   Magnesium 1.6  1.6 - 2.4 mg/dL Fi       10/13/2017  4:08 PM - Dick, [facility] Incoming Lab Results From [facility]     Component Results     Component Value Ref Range & Units Status Collected Lab   Sodium 139  135 - 145 meq/L Final 10/13/2017  2:41 PM  - [facility]   Potassium 4.4  3.5 - 5.2 meq/L Final 10/13/2017  2:41 PM  - [facility]   Chloride 96   98 - 111 meq/L Final 10/13/2017  2:41 PM  - [facility]   CO2 30  23 - 33 meq/L Final 10/13/2017  2:41 PM  - [facility]   Glucose 200   70 - 108 mg/dL Final 10/13/2017  2:41 PM  - [facility]   BUN 12  7 - 22 mg/dL Final 10/13/2017  2:41 PM  - [facility]   CREATININE 0.6  0.4 - 1.2 mg/dL Final 10/13/2017  2:41 PM  - [facility]   Calcium 9.8  8.5 - 10.5 mg/dL Final

## 2017-10-16 NOTE — TELEPHONE ENCOUNTER
Testing schedule for Nov 16,17 at 9:00 arrive at 8:30 [address]  Patient notified and voice understanding

## 2017-10-25 ENCOUNTER — OFFICE VISIT (OUTPATIENT)
Dept: PSYCHIATRY | Age: 42
End: 2017-10-25
Payer: COMMERCIAL

## 2017-10-25 DIAGNOSIS — F41.1 GAD (GENERALIZED ANXIETY DISORDER): ICD-10-CM

## 2017-10-25 DIAGNOSIS — F31.62 BIPOLAR DISORDER, CURRENT EPISODE MIXED, MODERATE (HCC): ICD-10-CM

## 2017-10-25 PROCEDURE — 90792 PSYCH DIAG EVAL W/MED SRVCS: CPT | Performed by: PSYCHIATRY & NEUROLOGY

## 2017-10-25 RX ORDER — LAMOTRIGINE 25 MG/1
TABLET ORAL
Qty: 42 TABLET | Refills: 0 | Status: SHIPPED | OUTPATIENT
Start: 2017-10-25 | End: 2017-11-20 | Stop reason: SDUPTHER
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
## 2017-10-26 ENCOUNTER — TELEPHONE (OUTPATIENT)
Dept: ENDOCRINOLOGY | Age: 42
End: 2017-10-26

## 2017-10-26 DIAGNOSIS — C73 FOLLICULAR THYROID CANCER (HCC): ICD-10-CM

## 2017-10-26 NOTE — TELEPHONE ENCOUNTER
----- Message from Quin Moreno MD sent at 10/21/2017 10:36 PM EDT -----  Reduce synthroid to 250 mcg daily.   Get ft4 and tsh in 1 month

## 2017-10-27 RX ORDER — LEVOTHYROXINE SODIUM 0.12 MG/1
TABLET ORAL
Qty: 60 TABLET | Refills: 5 | Status: SHIPPED | OUTPATIENT
Start: 2017-10-27 | End: 2018-01-09

## 2017-11-03 ENCOUNTER — OFFICE VISIT (OUTPATIENT)
Dept: FAMILY MEDICINE CLINIC | Age: 42
End: 2017-11-03

## 2017-11-03 VITALS
BODY MASS INDEX: 36.69 KG/M2 | SYSTOLIC BLOOD PRESSURE: 100 MMHG | WEIGHT: 220.25 LBS | DIASTOLIC BLOOD PRESSURE: 54 MMHG | RESPIRATION RATE: 16 BRPM | HEIGHT: 65 IN | HEART RATE: 64 BPM

## 2017-11-03 DIAGNOSIS — L73.9 FOLLICULITIS: Primary | ICD-10-CM

## 2017-11-03 PROCEDURE — 99213 OFFICE O/P EST LOW 20 MIN: CPT | Performed by: FAMILY MEDICINE

## 2017-11-03 RX ORDER — SULFAMETHOXAZOLE AND TRIMETHOPRIM 800; 160 MG/1; MG/1
1 TABLET ORAL 2 TIMES DAILY
Qty: 20 TABLET | Refills: 0 | Status: SHIPPED | OUTPATIENT
Start: 2017-11-03 | End: 2017-11-13

## 2017-11-03 RX ORDER — BUMETANIDE 1 MG/1
1 TABLET ORAL DAILY PRN
Refills: 0 | COMMUNITY
Start: 2017-10-26 | End: 2022-10-07

## 2017-11-03 RX ORDER — POTASSIUM CHLORIDE 20 MEQ/1
20 TABLET, EXTENDED RELEASE ORAL DAILY PRN
Refills: 0 | COMMUNITY
Start: 2017-10-26 | End: 2018-05-26

## 2017-11-03 ASSESSMENT — ENCOUNTER SYMPTOMS
SINUS PRESSURE: 0
SHORTNESS OF BREATH: 0
CONSTIPATION: 0
GASTROINTESTINAL NEGATIVE: 1

## 2017-11-20 ENCOUNTER — PROCEDURE VISIT (OUTPATIENT)
Dept: CARDIOLOGY CLINIC | Age: 42
End: 2017-11-20
Payer: COMMERCIAL

## 2017-11-20 DIAGNOSIS — Z45.09 ENCOUNTER FOR ELECTRONIC ANALYSIS OF REVEAL EVENT RECORDER: Primary | ICD-10-CM

## 2017-11-20 PROCEDURE — 93298 REM INTERROG DEV EVAL SCRMS: CPT | Performed by: INTERNAL MEDICINE

## 2017-11-20 RX ORDER — LAMOTRIGINE 25 MG/1
TABLET ORAL
Qty: 60 TABLET | Refills: 0 | Status: SHIPPED | OUTPATIENT
Start: 2017-11-20 | End: 2017-12-11

## 2017-11-27 ENCOUNTER — TELEPHONE (OUTPATIENT)
Dept: CARDIOLOGY CLINIC | Age: 42
End: 2017-11-27

## 2017-12-11 ENCOUNTER — OFFICE VISIT (OUTPATIENT)
Dept: PSYCHIATRY | Age: 42
End: 2017-12-11
Payer: COMMERCIAL

## 2017-12-11 DIAGNOSIS — F41.1 GAD (GENERALIZED ANXIETY DISORDER): ICD-10-CM

## 2017-12-11 DIAGNOSIS — F31.62 BIPOLAR DISORDER, CURRENT EPISODE MIXED, MODERATE (HCC): Primary | ICD-10-CM

## 2017-12-11 PROCEDURE — 99213 OFFICE O/P EST LOW 20 MIN: CPT | Performed by: PSYCHIATRY & NEUROLOGY

## 2017-12-11 RX ORDER — LAMOTRIGINE 200 MG/1
TABLET ORAL
Qty: 30 TABLET | Refills: 3 | Status: SHIPPED | OUTPATIENT
Start: 2017-12-11 | End: 2018-04-30 | Stop reason: SDUPTHER

## 2017-12-11 NOTE — PROGRESS NOTES
Chief Complaint   Patient presents with    Follow-up     6 wks Bipolar MARIXA Cannabis     Hope returned today after six weeks for follow-up of bipolar disorder and generalized anxiety. She continues on lamotrigine, but at 50 mg daily. She called in and obtained a refill for that size rather than advancing the dose. We'll go ahead now and increase her to 100 mg for a week, and then 200 mg thereafter. I told her she could use her 25 mg tablets four at a time to make the 100 if she wishes. I did prescribe the 200 mg tablets for her. She does note that she's not having as many suicidal thoughts. However she remains irritable and is having trouble with being somewhat labile. For as low as the dose is I think we are seeing some benefit. I'm hopeful that most of the symptoms will come under control. It may be that we will had have to add something for the panic. We will determine that later. Mental Status Examination    Level of consciousness:  within normal limits  Appearance:  well-appearing, street clothes, in chair, good grooming and good hygiene  Behavior/Motor:  no abnormalities noted  Attitude toward examiner:  cooperative, attentive and good eye contact  Speech:  spontaneous, normal rate, normal volume and well articulated  Mood:  mild depression, irritable  Affect:  mood congruent  Thought processes:  linear, goal directed and coherent  Thought content:  Homocidal ideation: none  Suicidal Ideation:  denies suicidal ideation  Delusions:  no evidence of delusions  Perceptual Disturbance:  denies any perceptual disturbance  Cognition:  oriented to person, place, and time  Concentration succeeded  Memory intact  Fund of knowledge:  good  Abstract thinking:  fair  Insight:  good  Judgment:  good    . DIAGNOSTIC IMPRESSION  Bipolar mixed moderate  MARIXA    Plan  Titrate to 200 mg lamotrigine  Current Outpatient Prescriptions   Medication Sig Dispense Refill    lamoTRIgine (LAMICTAL) 200 MG tablet Take 1/2 tablet by mouth daily for first week, then change to a whole tablet daily and maintain that dose. 30 tablet 3    bumetanide (BUMEX) 1 MG tablet   0    potassium chloride (KLOR-CON M) 20 MEQ extended release tablet   0    levothyroxine (SYNTHROID) 125 MCG tablet take 2 tablets by mouth once daily 60 tablet 5    tiZANidine (ZANAFLEX) 2 MG tablet Take 1 tablet by mouth 3 times daily as needed (muscle spasms) 90 tablet 0    ibuprofen (ADVIL;MOTRIN) 800 MG tablet Take 1 tablet by mouth every 8 hours as needed for Pain 90 tablet 0    lidocaine (LIDODERM) 5 % Place 1 patch onto the skin daily 12 hours on, 12 hours off. 30 patch 0    gabapentin (NEURONTIN) 300 MG capsule Take 1 capsule by mouth nightly 30 capsule 0    glucose blood VI test strips (TRUETEST TEST) strip Check blood sugar 4 x daily (Dx: E11.65) 150 each 5    RA MELATONIN 5 MG TABS tablet take 1 tablet at bedtime if needed for insomnia  0    fluticasone propionate (FLOVENT DISKUS) 100 MCG/BLIST AEPB inhaler Inhale 1 puff into the lungs daily (Patient taking differently: Inhale 1 puff into the lungs daily as needed ) 60 each 0    albuterol sulfate HFA (PROAIR HFA) 108 (90 BASE) MCG/ACT inhaler Inhale 2 puffs into the lungs every 4 hours as needed for Wheezing 1 Inhaler 1    butalbital-acetaminophen-caffeine (FIORICET) -40 MG per tablet Take 1 tablet by mouth every 6 hours as needed for Headaches 30 tablet 0    Lancets MISC Check blood sugar 4 x daily (Dx: E11.65) 150 each 3    valACYclovir (VALTREX) 500 MG tablet Take 1 tablet by mouth daily 30 tablet 11    estradiol (ESTRACE) 2 MG tablet 1 PO DAILY 30 tablet 11     No current facility-administered medications for this visit.

## 2017-12-26 ENCOUNTER — PROCEDURE VISIT (OUTPATIENT)
Dept: CARDIOLOGY CLINIC | Age: 42
End: 2017-12-26
Payer: COMMERCIAL

## 2017-12-26 DIAGNOSIS — Z45.09 ENCOUNTER FOR ELECTRONIC ANALYSIS OF REVEAL EVENT RECORDER: Primary | ICD-10-CM

## 2017-12-26 PROCEDURE — 93298 REM INTERROG DEV EVAL SCRMS: CPT | Performed by: INTERNAL MEDICINE

## 2018-01-03 ENCOUNTER — OFFICE VISIT (OUTPATIENT)
Dept: FAMILY MEDICINE CLINIC | Age: 43
End: 2018-01-03

## 2018-01-03 VITALS
BODY MASS INDEX: 35.57 KG/M2 | RESPIRATION RATE: 16 BRPM | SYSTOLIC BLOOD PRESSURE: 104 MMHG | WEIGHT: 213.5 LBS | DIASTOLIC BLOOD PRESSURE: 64 MMHG | HEIGHT: 65 IN | HEART RATE: 68 BPM

## 2018-01-03 DIAGNOSIS — J31.0 CHRONIC RHINITIS, UNSPECIFIED TYPE: Primary | ICD-10-CM

## 2018-01-03 DIAGNOSIS — G89.29 CHRONIC PAIN OF RIGHT ANKLE: ICD-10-CM

## 2018-01-03 DIAGNOSIS — M25.571 CHRONIC PAIN OF RIGHT ANKLE: ICD-10-CM

## 2018-01-03 DIAGNOSIS — B00.9 HERPES: ICD-10-CM

## 2018-01-03 PROCEDURE — 99213 OFFICE O/P EST LOW 20 MIN: CPT | Performed by: FAMILY MEDICINE

## 2018-01-03 RX ORDER — MONTELUKAST SODIUM 10 MG/1
10 TABLET ORAL NIGHTLY
Qty: 30 TABLET | Refills: 11 | Status: SHIPPED | OUTPATIENT
Start: 2018-01-03 | End: 2018-10-11 | Stop reason: ALTCHOICE

## 2018-01-03 RX ORDER — VALACYCLOVIR HYDROCHLORIDE 500 MG/1
500 TABLET, FILM COATED ORAL DAILY
Qty: 30 TABLET | Refills: 11 | Status: SHIPPED | OUTPATIENT
Start: 2018-01-03 | End: 2018-10-11

## 2018-01-03 ASSESSMENT — ENCOUNTER SYMPTOMS
VOICE CHANGE: 1
DIARRHEA: 1
RHINORRHEA: 1
SINUS PAIN: 1
COUGH: 1
SORE THROAT: 1
CONSTIPATION: 0
SHORTNESS OF BREATH: 0
NAUSEA: 0
SINUS PRESSURE: 1

## 2018-01-08 ENCOUNTER — HOSPITAL ENCOUNTER (OUTPATIENT)
Age: 43
Discharge: HOME | End: 2018-01-08
Payer: COMMERCIAL

## 2018-01-08 DIAGNOSIS — C73 FOLLICULAR THYROID CANCER (HCC): ICD-10-CM

## 2018-01-08 LAB
T4 FREE: 2.65 NG/DL (ref 0.93–1.76)
TSH SERPL DL<=0.05 MIU/L-ACNC: 0.01 UIU/ML (ref 0.4–4.2)

## 2018-01-08 PROCEDURE — 84443 ASSAY THYROID STIM HORMONE: CPT

## 2018-01-08 PROCEDURE — 84439 ASSAY OF FREE THYROXINE: CPT

## 2018-01-08 PROCEDURE — 36415 COLL VENOUS BLD VENIPUNCTURE: CPT

## 2018-01-09 ENCOUNTER — OFFICE VISIT (OUTPATIENT)
Dept: ENDOCRINOLOGY | Age: 43
End: 2018-01-09
Payer: COMMERCIAL

## 2018-01-09 VITALS
BODY MASS INDEX: 35.49 KG/M2 | RESPIRATION RATE: 16 BRPM | HEIGHT: 65 IN | WEIGHT: 213 LBS | DIASTOLIC BLOOD PRESSURE: 79 MMHG | HEART RATE: 75 BPM | SYSTOLIC BLOOD PRESSURE: 125 MMHG

## 2018-01-09 DIAGNOSIS — C73 FOLLICULAR THYROID CANCER (HCC): ICD-10-CM

## 2018-01-09 DIAGNOSIS — E89.0 HYPOTHYROIDISM, POSTSURGICAL: ICD-10-CM

## 2018-01-09 DIAGNOSIS — E11.65 UNCONTROLLED TYPE 2 DIABETES MELLITUS WITH HYPERGLYCEMIA, WITHOUT LONG-TERM CURRENT USE OF INSULIN (HCC): Primary | ICD-10-CM

## 2018-01-09 PROCEDURE — 3046F HEMOGLOBIN A1C LEVEL >9.0%: CPT | Performed by: INTERNAL MEDICINE

## 2018-01-09 PROCEDURE — G8484 FLU IMMUNIZE NO ADMIN: HCPCS | Performed by: INTERNAL MEDICINE

## 2018-01-09 PROCEDURE — G8599 NO ASA/ANTIPLAT THER USE RNG: HCPCS | Performed by: INTERNAL MEDICINE

## 2018-01-09 PROCEDURE — 99214 OFFICE O/P EST MOD 30 MIN: CPT | Performed by: INTERNAL MEDICINE

## 2018-01-09 PROCEDURE — 4004F PT TOBACCO SCREEN RCVD TLK: CPT | Performed by: INTERNAL MEDICINE

## 2018-01-09 PROCEDURE — G8417 CALC BMI ABV UP PARAM F/U: HCPCS | Performed by: INTERNAL MEDICINE

## 2018-01-09 PROCEDURE — G8427 DOCREV CUR MEDS BY ELIG CLIN: HCPCS | Performed by: INTERNAL MEDICINE

## 2018-01-09 RX ORDER — GLIMEPIRIDE 1 MG/1
1 TABLET ORAL EVERY MORNING
Qty: 60 TABLET | Refills: 3 | Status: SHIPPED | OUTPATIENT
Start: 2018-01-09 | End: 2018-10-11

## 2018-01-09 RX ORDER — LEVOTHYROXINE SODIUM 0.2 MG/1
200 TABLET ORAL DAILY
Qty: 30 TABLET | Refills: 3 | Status: SHIPPED | OUTPATIENT
Start: 2018-01-09 | End: 2018-07-19 | Stop reason: SDUPTHER

## 2018-01-09 RX ORDER — LEVOTHYROXINE SODIUM 0.03 MG/1
25 TABLET ORAL DAILY
Qty: 30 TABLET | Refills: 3 | Status: SHIPPED | OUTPATIENT
Start: 2018-01-09 | End: 2018-07-19 | Stop reason: SDUPTHER

## 2018-01-09 RX ORDER — BUMETANIDE 1 MG/1
TABLET ORAL
Qty: 30 TABLET | Refills: 7 | Status: ON HOLD | OUTPATIENT
Start: 2018-01-09 | End: 2018-05-31 | Stop reason: HOSPADM

## 2018-01-09 RX ORDER — POTASSIUM CHLORIDE 20 MEQ/1
TABLET, EXTENDED RELEASE ORAL
Qty: 30 TABLET | Refills: 7 | Status: SHIPPED | OUTPATIENT
Start: 2018-01-09 | End: 2022-10-07

## 2018-01-09 NOTE — PROGRESS NOTES
[facility] PROFESSIONAL SERVS  ENDOCRINE DIABETES METABOLISM REYES  [address]  [address]  Dept: [phone]  Dept Fax: 14 : [phone]    Visit Date: 1/9/2018    Carlene Sidhu is a 43 y.o. female who presents today for:  Chief Complaint   Patient presents with    Other     Follicular Thyroid Cancer    Hypothyroidism    Diabetes     Type 2- did not bring meter or blood sugar readings    Foot Problem     has a couple bumps on top of foot and left foot has a red spot and has been hurting    Eye Problem     2016    Hyperlipidemia    Results     1/8/18- labs completed            Subjective:   49-year-old female being followed for type 2 diabetes mellitus, thyroid cancer and hypothyroidism. She was diagnosed with diabetes mellitus 12 years ago. She is currently not taking anything because her insurance has been giving her the run around. Otherwise she was supposed to be on Invega, and glimepiride. Patient has not taken this medication since August.  She also is not checking her blood sugar but she has strips and lancets as she tells me. The patient denies polyuria and polydipsia. She has been having intermittent blurriness of vision. She has not had any open sores or blisters in the feet. She denies chest pain or shortness of breath. This patient also has a history of follicular thyroid cancer which was diagnosed in 2005. She underwent thyroidectomy followed by radioactive iodine therapy. The patient is taking Synthroid 275 µg daily. After her last visit, I had recommended changing the dose to 250 µg but this has not been done. I do see the nurse communication with the patient. She is happy that she has finally been able to lose weight but have free T4 level is elevated at 2.65 with TSH suppression. The patient denies palpitations and tremors. She denies chest pain and shortness of breath.   She has not palpated any thyroid bed lesions and she denies thyroid Type II or unspecified type diabetes mellitus without mention of complication, not stated as uncontrolled     Unstable angina (HCC)     Possible unstable angina. Past Surgical History:   Procedure Laterality Date    ABDOMEN SURGERY      ABLATION OF DYSRHYTHMIC FOCUS  2010   Mary Gourd ATRIAL ABLATION SURGERY      BACK SURGERY  2010    cyst removed    BLADDER REPAIR      CARDIAC CATHETERIZATION      CARDIAC SURGERY  5 -8 -12    Reveal medtronic loop recorder insert    CARDIOVASCULAR STRESS TEST  3 30 2011    The gated SPECT demonstrated normal LV function, demonstrated normal thickening, normal contractility, normal motion, EF 53%. No evidence of stress-induced ischemia is noted on this study. There is evidence of bowel interference with the inferior counts and also perfusion defect seen in the anterior apical wall both at rest and stress.  CARDIOVASCULAR STRESS TEST  9 15 2003    There is no scintigraphic evidence of inducible myocardial ischemia or infarction. LV systolic function is normal. Exercise capacity is mildly reduced.  CYST REMOVAL      L5-S1 facet cyst excision resulting in postoperative complications including hematoma and infection requiring I&D.  DILATION AND CURETTAGE OF UTERUS  1995    HYSTERECTOMY, TOTAL ABDOMINAL  1-12-16    LAPAROSCOPY  01/02/2013    Diagnostic Laparoscopy, D & C Novasure, Hysteroscopy, Endometrial Ablation, AP Repair    OTHER SURGICAL HISTORY  04/27/2017    loop recorder placed by Dr Madi Cardenas at [address]  1981    Follicular thyroid cancer, S/P total thyroidectomy in 2005, radioactive iodine ablation in 2007.  TRANSTHORACIC ECHOCARDIOGRAM  3 30 2011    Size was normal. systolic function was normal. EF was estimated in the range of 55-65%. There were no regional wall motion abnormalities.  Wall thickness was normal. Doppler parameters were consistent with abnormal LV relaxation (grade 1 diastolic dysfunction.)    [facility]OL[facility]ATIO      Review of Systems  Constitutional: negative for chills and fevers  Eyes: negative for irritation, redness. Reports visual blurriness. Respiratory: negative for cough, shortness of breath and wheezing  Cardiovascular: negative for chest pain, irregular heart beat and palpitations    The remainder of systems were reviewed and negative. Objective:   /79   Pulse 75   Resp 16   Ht 5' 5\" (1.651 m)   Wt 213 lb (96.6 kg)   LMP 12/19/2014   BMI 35.45 kg/m²     General:  alert, appears stated age, cooperative and no distress   Oropharynx: normal findings: lips normal without lesions, buccal mucosa normal, gums healthy and tongue midline and normal    Eyes:  negative findings: lids and lashes normal, conjunctivae and sclerae normal, corneas clear and pupils equal, round, reactive to light and accomodation       Neck: no adenopathy, no carotid bruit, no JVD and supple, symmetrical, trachea midline   Thyroid:  s/p thyroidectomy   Lung: clear to auscultation bilaterally   Heart:  regular rate and rhythm, S1, S2 normal, no murmur, click, rub or gallop and normal apical impulse   Abdomen: soft, non-tender; bowel sounds normal; no masses,  no organomegaly   Extremities: extremities normal, atraumatic, no cyanosis or edema and Homans sign is negative, no sign of DVT   Pulses: 2+ and symmetric   Skin: warm and dry, no hyperpigmentation, vitiligo, or suspicious lesions   Neuro: normal without focal findings, mental status, speech normal, alert and oriented x3, CHANTAL, cranial nerves 2-12 intact, muscle tone and strength normal and symmetric. LNS: no cervical/supraclavicular or submental adenopathy  Psych: awake, alert and oriented, with good eye contact and normal behavior. Affect is normal with no anxious or depressed mood. Insight is appropriate with no hallucinations/delusions or SI  M/S: no muscular hypertrophy or tenderness.  There is no joint swelling/tenderness/redness  Patient was asked to remove their socks and shoes and a full foot exam was performed. The following was found during the patient's foot exam:   [x]   Normal Exam  Monofilament sensation   [x]   Normal      []   Abnormal   Pulses   [x]   Normal      []   Abnormal    Assessment/Plan:     1. Uncontrolled type 2 diabetes mellitus with hyperglycemia, without long-term current use of insulin ([address]   uncontrolled but I do not have numbers to look at. The patient was advised to restart checking blood sugars at least twice a day, which will be modified after we reviewed her blood sugar. I will put her back on glimepiride and she was urged to send some blood sugar readings in one week. She needs to follow-up with ophthalmology. We discussed the issue of amputations with invokana, and she is not interested in it. 2. Follicular thyroid cancer, 2005   patient has done well following original therapy. Ultrasound from last year was reassuring. Thyroglobulin is undetectable. We need to decrease Synthroid because she is over treated. 3. Hypothyroidism, postsurgical   overmedicated. I will reduce Synthroid to 225 µg.  Get labs in one month and call back. She will have to diet more to prevent weight gain. Physical activity is somewhat limited by her cardiac issues.          Orders Placed This Encounter   Procedures    TSH without Reflex     Standing Status:   Future     Standing Expiration Date:   1/9/2019    T4, Free     Standing Status:   Future     Standing Expiration Date:   1/9/2019    Hemoglobin A1C     Standing Status:   Future     Standing Expiration Date:   1/9/2019    Microalbumin / Creatinine Urine Ratio     Standing Status:   Future     Standing Expiration Date:   1/9/2019    Lipid Panel     Standing Status:   Future     Standing Expiration Date:   1/9/2019     Order Specific Question:   Is Patient Fasting?/# of Hours     Answer:   12 hours    Comprehensive Metabolic Panel     Standing

## 2018-01-09 NOTE — LETTER
Endocrine Diabetes Metabolism [facility]  [address]  [address]  Phone: [phone]  Fax: [phone]    Naif Newsome MD      01/09/18    Dr. Nithya Gary    Patient: Juan Tamez  MR Number: [id number]  YOB: 1975  Date of Visit: 1/9/2018      Dear Dr. Nithya Gary    Thank you for the request for consultation for Juan Tamez to me for the evaluation of   Chief Complaint   Patient presents with    Other     Follicular Thyroid Cancer    Hypothyroidism    Diabetes     Type 2- did not bring meter or blood sugar readings    Foot Problem     has a couple bumps on top of foot and left foot has a red spot and has been hurting    Eye Problem     2016    Hyperlipidemia    Results     1/8/18- labs completed   . Below are the relevant portions of my assessment and plan of care. Subjective:   51-year-old female being followed for type 2 diabetes mellitus, thyroid cancer and hypothyroidism. She was diagnosed with diabetes mellitus 12 years ago. She is currently not taking anything because her insurance has been giving her the run around. Otherwise she was supposed to be on Invega, and glimepiride. Patient has not taken this medication since August.  She also is not checking her blood sugar but she has strips and lancets as she tells me. The patient denies polyuria and polydipsia. She has been having intermittent blurriness of vision. She has not had any open sores or blisters in the feet. She denies chest pain or shortness of breath. This patient also has a history of follicular thyroid cancer which was diagnosed in 2005. She underwent thyroidectomy followed by radioactive iodine therapy. The patient is taking Synthroid 275 µg daily. After her last visit, I had recommended changing the dose to 250 µg but this has not been done. I do see the nurse communication with the patient.   She is happy that she has finally been able to lose weight but Size was normal. systolic function was normal. EF was estimated in the range of 55-65%. There were no regional wall motion abnormalities. Wall thickness was normal. Doppler parameters were consistent with abnormal LV relaxation (grade 1 diastolic dysfunction.)     TUBAL LIGATION  1999       Family History   Problem Relation Age of Onset    COPD Mother     Diabetes Mother     Heart Disease Mother     Colon Cancer Neg Hx      Social History   Substance Use Topics    Smoking status: Current Every Day Smoker     Packs/day: 1.00     Years: 10.00     Types: Cigarettes    Smokeless tobacco: Never Used    Alcohol use No      Comment: Patient states, Ang Beltran was a heavy drinker in the past and quit in 2010. \"       Current Outpatient Prescriptions   Medication Sig Dispense Refill    potassium chloride (KLOR-CON M) 20 MEQ extended release tablet take 1 tablet by mouth once daily 30 tablet 7    bumetanide (BUMEX) 1 MG tablet take 1 tablet by mouth once daily 30 tablet 7    levothyroxine (SYNTHROID) 200 MCG tablet Take 1 tablet by mouth Daily 30 tablet 3    levothyroxine (SYNTHROID) 25 MCG tablet Take 1 tablet by mouth Daily 30 tablet 3    glimepiride (AMARYL) 1 MG tablet Take 1 tablet by mouth every morning 60 tablet 3    Lidocaine (LIDODERM EX) Apply topically      valACYclovir (VALTREX) 500 MG tablet Take 1 tablet by mouth daily 30 tablet 11    montelukast (SINGULAIR) 10 MG tablet Take 1 tablet by mouth nightly 30 tablet 11    lamoTRIgine (LAMICTAL) 200 MG tablet Take 1/2 tablet by mouth daily for first week, then change to a whole tablet daily and maintain that dose.  30 tablet 3    bumetanide (BUMEX) 1 MG tablet Take 1 mg by mouth daily as needed   0    potassium chloride (KLOR-CON M) 20 MEQ extended release tablet Take 20 mEq by mouth daily as needed   0    gabapentin (NEURONTIN) 300 MG capsule Take 1 capsule by mouth nightly 30 capsule 0  glucose blood VI test strips (TRUETEST TEST) strip Check blood sugar 4 x daily (Dx: E11.65) 150 each 5    RA MELATONIN 5 MG TABS tablet take 1 tablet at bedtime if needed for insomnia  0    fluticasone propionate (FLOVENT DISKUS) 100 MCG/BLIST AEPB inhaler Inhale 1 puff into the lungs daily (Patient taking differently: Inhale 1 puff into the lungs daily as needed ) 60 each 0    albuterol sulfate HFA (PROAIR HFA) 108 (90 BASE) MCG/ACT inhaler Inhale 2 puffs into the lungs every 4 hours as needed for Wheezing 1 Inhaler 1    butalbital-acetaminophen-caffeine (FIORICET) -40 MG per tablet Take 1 tablet by mouth every 6 hours as needed for Headaches 30 tablet 0    Lancets MISC Check blood sugar 4 x daily (Dx: E11.65) 150 each 3    estradiol (ESTRACE) 2 MG tablet 1 PO DAILY 30 tablet 11     No current facility-administered medications for this visit.       Allergies   Allergen Reactions    Latex Other (See Comments)     Blisters everywhere    Niaspan [Niacin Er] Anaphylaxis    Tape [Adhesive Tape] Dermatitis     Blisters    Morphine Nausea And Vomiting     Health Maintenance   Topic Date Due    DTaP/Tdap/Td vaccine (1 - Tdap) 04/30/1994    Pneumococcal med risk (1 of 1 - PPSV23) 04/30/1994    Diabetic foot exam  01/28/2017    Diabetic retinal exam  03/16/2017    Flu vaccine (1) 09/01/2017    Breast cancer screen  08/10/2018    A1C test (Diabetic or Prediabetic)  08/23/2018    Diabetic microalbuminuria test  08/23/2018    Lipid screen  08/23/2018    Potassium monitoring  10/13/2018    Creatinine monitoring  10/13/2018    TSH testing  01/08/2019    HIV screen  Completed       Labs  Lab Results   Component Value Date    LABA1C 8.8 (H) 08/23/2017     No results found for: EAG  Lab Results   Component Value Date    TSH 0.006 (L) 01/08/2018     Lab Results   Component Value Date    CHOL 111 08/23/2017    CHOL 144 01/28/2016    CHOL 198 04/27/2015     Lab Results   Component Value Date TRIG 56 08/23/2017    TRIG 83 01/28/2016    TRIG 107 04/27/2015     Lab Results   Component Value Date    HDL 48 08/23/2017    HDL 45 01/28/2016    HDL 43 04/27/2015     Lab Results   Component Value Date    LDLCALC 52 08/23/2017    LDLCALC 82 01/28/2016    LDLCALC 134 04/27/2015     No results found for: LABVLDL, VLDL  No results found for: CHOLHDLRATIO      Review of Systems  Constitutional: negative for chills and fevers  Eyes: negative for irritation, redness. Reports visual blurriness. Respiratory: negative for cough, shortness of breath and wheezing  Cardiovascular: negative for chest pain, irregular heart beat and palpitations    The remainder of systems were reviewed and negative. Objective:   /79   Pulse 75   Resp 16   Ht 5' 5\" (1.651 m)   Wt 213 lb (96.6 kg)   LMP 12/19/2014   BMI 35.45 kg/m²      General:  alert, appears stated age, cooperative and no distress   Oropharynx: normal findings: lips normal without lesions, buccal mucosa normal, gums healthy and tongue midline and normal    Eyes:  negative findings: lids and lashes normal, conjunctivae and sclerae normal, corneas clear and pupils equal, round, reactive to light and accomodation       Neck: no adenopathy, no carotid bruit, no JVD and supple, symmetrical, trachea midline   Thyroid:  s/p thyroidectomy   Lung: clear to auscultation bilaterally   Heart:  regular rate and rhythm, S1, S2 normal, no murmur, click, rub or gallop and normal apical impulse   Abdomen: soft, non-tender; bowel sounds normal; no masses,  no organomegaly   Extremities: extremities normal, atraumatic, no cyanosis or edema and Homans sign is negative, no sign of DVT   Pulses: 2+ and symmetric   Skin: warm and dry, no hyperpigmentation, vitiligo, or suspicious lesions   Neuro: normal without focal findings, mental status, speech normal, alert and oriented x3, CHANTAL, cranial nerves 2-12 intact, muscle tone and strength normal and symmetric.

## 2018-01-15 ENCOUNTER — TELEPHONE (OUTPATIENT)
Dept: ENDOCRINOLOGY | Age: 43
End: 2018-01-15

## 2018-01-26 ENCOUNTER — PROCEDURE VISIT (OUTPATIENT)
Dept: CARDIOLOGY CLINIC | Age: 43
End: 2018-01-26
Payer: COMMERCIAL

## 2018-01-26 DIAGNOSIS — Z45.09 ENCOUNTER FOR ELECTRONIC ANALYSIS OF REVEAL EVENT RECORDER: Primary | ICD-10-CM

## 2018-01-26 PROCEDURE — 93298 REM INTERROG DEV EVAL SCRMS: CPT | Performed by: INTERNAL MEDICINE

## 2018-02-05 ENCOUNTER — OFFICE VISIT (OUTPATIENT)
Dept: PSYCHIATRY | Age: 43
End: 2018-02-05
Payer: COMMERCIAL

## 2018-02-05 DIAGNOSIS — F31.62 BIPOLAR DISORDER, CURRENT EPISODE MIXED, MODERATE (HCC): Primary | ICD-10-CM

## 2018-02-05 DIAGNOSIS — F41.1 GAD (GENERALIZED ANXIETY DISORDER): ICD-10-CM

## 2018-02-05 PROCEDURE — 99213 OFFICE O/P EST LOW 20 MIN: CPT | Performed by: PSYCHIATRY & NEUROLOGY

## 2018-02-05 RX ORDER — ARIPIPRAZOLE 2 MG/1
2 TABLET ORAL DAILY
Qty: 30 TABLET | Refills: 5 | Status: SHIPPED | OUTPATIENT
Start: 2018-02-05 | End: 2018-04-05

## 2018-02-05 RX ORDER — ESCITALOPRAM OXALATE 10 MG/1
10 TABLET ORAL DAILY
Qty: 30 TABLET | Refills: 5 | Status: SHIPPED | OUTPATIENT
Start: 2018-02-05 | End: 2018-04-05 | Stop reason: DRUGHIGH

## 2018-03-06 ENCOUNTER — PROCEDURE VISIT (OUTPATIENT)
Dept: CARDIOLOGY CLINIC | Age: 43
End: 2018-03-06
Payer: COMMERCIAL

## 2018-03-06 DIAGNOSIS — Z45.09 ENCOUNTER FOR ELECTRONIC ANALYSIS OF REVEAL EVENT RECORDER: Primary | ICD-10-CM

## 2018-03-06 PROCEDURE — 93297 REM INTERROG DEV EVAL ICPMS: CPT | Performed by: INTERNAL MEDICINE

## 2018-04-05 ENCOUNTER — PROCEDURE VISIT (OUTPATIENT)
Dept: CARDIOLOGY CLINIC | Age: 43
End: 2018-04-05

## 2018-04-05 ENCOUNTER — OFFICE VISIT (OUTPATIENT)
Dept: PSYCHIATRY | Age: 43
End: 2018-04-05
Payer: COMMERCIAL

## 2018-04-05 DIAGNOSIS — F41.1 GAD (GENERALIZED ANXIETY DISORDER): ICD-10-CM

## 2018-04-05 DIAGNOSIS — F31.62 BIPOLAR DISORDER, CURRENT EPISODE MIXED, MODERATE (HCC): Primary | ICD-10-CM

## 2018-04-05 DIAGNOSIS — Z45.09 ENCOUNTER FOR ELECTRONIC ANALYSIS OF REVEAL EVENT RECORDER: Primary | ICD-10-CM

## 2018-04-05 PROCEDURE — 99213 OFFICE O/P EST LOW 20 MIN: CPT | Performed by: PSYCHIATRY & NEUROLOGY

## 2018-04-05 RX ORDER — ESCITALOPRAM OXALATE 20 MG/1
20 TABLET ORAL DAILY
Qty: 30 TABLET | Refills: 5 | Status: SHIPPED | OUTPATIENT
Start: 2018-04-05 | End: 2018-10-11

## 2018-05-18 ENCOUNTER — PROCEDURE VISIT (OUTPATIENT)
Dept: CARDIOLOGY CLINIC | Age: 43
End: 2018-05-18
Payer: COMMERCIAL

## 2018-05-18 DIAGNOSIS — Z45.09 ENCOUNTER FOR ELECTRONIC ANALYSIS OF REVEAL EVENT RECORDER: Primary | ICD-10-CM

## 2018-05-18 PROCEDURE — 93299 PR REM INTERROG ICPMS/SCRMS <30 D TECH REVIEW: CPT | Performed by: INTERNAL MEDICINE

## 2018-05-18 PROCEDURE — 93298 REM INTERROG DEV EVAL SCRMS: CPT | Performed by: INTERNAL MEDICINE

## 2018-05-26 ENCOUNTER — APPOINTMENT (OUTPATIENT)
Dept: GENERAL RADIOLOGY | Age: 43
DRG: 171 | End: 2018-05-26
Payer: COMMERCIAL

## 2018-05-26 ENCOUNTER — HOSPITAL ENCOUNTER (OUTPATIENT)
Age: 43
Setting detail: OBSERVATION
Discharge: LEFT BEFORE DISCHARGE | DRG: 171 | End: 2018-05-26
Attending: INTERNAL MEDICINE | Admitting: FAMILY MEDICINE
Payer: COMMERCIAL

## 2018-05-26 ENCOUNTER — HOSPITAL ENCOUNTER (INPATIENT)
Age: 43
LOS: 5 days | Discharge: HOME | DRG: 171 | End: 2018-05-31
Attending: EMERGENCY MEDICINE | Admitting: FAMILY MEDICINE
Payer: COMMERCIAL

## 2018-05-26 VITALS
WEIGHT: 180 LBS | OXYGEN SATURATION: 100 % | HEIGHT: 65 IN | HEART RATE: 54 BPM | TEMPERATURE: 98.1 F | DIASTOLIC BLOOD PRESSURE: 70 MMHG | BODY MASS INDEX: 29.99 KG/M2 | RESPIRATION RATE: 16 BRPM | SYSTOLIC BLOOD PRESSURE: 101 MMHG

## 2018-05-26 DIAGNOSIS — R55 NEAR SYNCOPE: Primary | ICD-10-CM

## 2018-05-26 DIAGNOSIS — R07.9 CHEST PAIN, UNSPECIFIED TYPE: Primary | ICD-10-CM

## 2018-05-26 LAB
ALBUMIN SERPL-MCNC: 4.1 G/DL (ref 3.5–5.1)
ALP BLD-CCNC: 83 U/L (ref 38–126)
ALT SERPL-CCNC: 11 U/L (ref 11–66)
AMORPHOUS: ABNORMAL
ANION GAP SERPL CALCULATED.3IONS-SCNC: 12 MEQ/L (ref 8–16)
AST SERPL-CCNC: 19 U/L (ref 5–40)
BACTERIA: ABNORMAL /HPF
BASOPHILS # BLD: 0.8 %
BASOPHILS ABSOLUTE: 0 THOU/MM3 (ref 0–0.1)
BILIRUB SERPL-MCNC: 0.8 MG/DL (ref 0.3–1.2)
BILIRUBIN DIRECT: < 0.2 MG/DL (ref 0–0.3)
BILIRUBIN URINE: ABNORMAL
BLOOD, URINE: NEGATIVE
BUN BLDV-MCNC: 10 MG/DL (ref 7–22)
CALCIUM SERPL-MCNC: 9.5 MG/DL (ref 8.5–10.5)
CASTS UA: ABNORMAL /LPF
CHARACTER, URINE: ABNORMAL
CHLORIDE BLD-SCNC: 99 MEQ/L (ref 98–111)
CO2: 26 MEQ/L (ref 23–33)
COLOR: ABNORMAL
CREAT SERPL-MCNC: 0.6 MG/DL (ref 0.4–1.2)
CRYSTALS, UA: ABNORMAL
D-DIMER QUANTITATIVE: 298 NG/ML FEU (ref 0–500)
EKG ATRIAL RATE: 60 BPM
EKG P AXIS: 37 DEGREES
EKG P-R INTERVAL: 134 MS
EKG Q-T INTERVAL: 454 MS
EKG QRS DURATION: 96 MS
EKG QTC CALCULATION (BAZETT): 454 MS
EKG R AXIS: 71 DEGREES
EKG T AXIS: 75 DEGREES
EKG VENTRICULAR RATE: 60 BPM
EOSINOPHIL # BLD: 1.5 %
EOSINOPHILS ABSOLUTE: 0.1 THOU/MM3 (ref 0–0.4)
EPITHELIAL CELLS, UA: ABNORMAL /HPF
GFR SERPL CREATININE-BSD FRML MDRD: > 90 ML/MIN/1.73M2
GLUCOSE BLD-MCNC: 171 MG/DL (ref 70–108)
GLUCOSE URINE: NEGATIVE MG/DL
HCT VFR BLD CALC: 44.3 % (ref 37–47)
HEMOGLOBIN: 15.2 GM/DL (ref 12–16)
ICTOTEST: NEGATIVE
INR BLD: 1.09 (ref 0.85–1.13)
KETONES, URINE: ABNORMAL
LEUKOCYTE ESTERASE, URINE: ABNORMAL
LYMPHOCYTES # BLD: 31.9 %
LYMPHOCYTES ABSOLUTE: 2 THOU/MM3 (ref 1–4.8)
MAGNESIUM: 1.7 MG/DL (ref 1.6–2.4)
MCH RBC QN AUTO: 30.3 PG (ref 27–31)
MCHC RBC AUTO-ENTMCNC: 34.4 GM/DL (ref 33–37)
MCV RBC AUTO: 88 FL (ref 81–99)
MONOCYTES # BLD: 3.9 %
MONOCYTES ABSOLUTE: 0.2 THOU/MM3 (ref 0.4–1.3)
MUCUS: ABNORMAL
NITRITE, URINE: NEGATIVE
NUCLEATED RED BLOOD CELLS: 0 /100 WBC
OSMOLALITY CALCULATION: 276.9 MOSMOL/KG (ref 275–300)
PDW BLD-RTO: 13.7 % (ref 11.5–14.5)
PH UA: 5.5
PLATELET # BLD: 234 THOU/MM3 (ref 130–400)
PMV BLD AUTO: 8.8 FL (ref 7.4–10.4)
POTASSIUM SERPL-SCNC: 3.6 MEQ/L (ref 3.5–5.2)
PRO-BNP: 64.6 PG/ML (ref 0–450)
PROLACTIN: 35.5 NG/ML
PROTEIN UA: 30
RBC # BLD: 5.04 MILL/MM3 (ref 4.2–5.4)
RBC URINE: ABNORMAL /HPF
SEG NEUTROPHILS: 61.9 %
SEGMENTED NEUTROPHILS ABSOLUTE COUNT: 3.8 THOU/MM3 (ref 1.8–7.7)
SODIUM BLD-SCNC: 137 MEQ/L (ref 135–145)
SPECIFIC GRAVITY, URINE: 1.02 (ref 1–1.03)
TOTAL PROTEIN: 7.6 G/DL (ref 6.1–8)
TROPONIN T: < 0.01 NG/ML
TROPONIN T: < 0.01 NG/ML
UROBILINOGEN, URINE: 1 EU/DL
WBC # BLD: 6.2 THOU/MM3 (ref 4.8–10.8)
WBC UA: ABNORMAL /HPF

## 2018-05-26 PROCEDURE — 71046 X-RAY EXAM CHEST 2 VIEWS: CPT

## 2018-05-26 PROCEDURE — 99284 EMERGENCY DEPT VISIT MOD MDM: CPT

## 2018-05-26 PROCEDURE — 87086 URINE CULTURE/COLONY COUNT: CPT

## 2018-05-26 PROCEDURE — 6370000000 HC RX 637 (ALT 250 FOR IP)

## 2018-05-26 PROCEDURE — 93005 ELECTROCARDIOGRAM TRACING: CPT | Performed by: INTERNAL MEDICINE

## 2018-05-26 PROCEDURE — 83735 ASSAY OF MAGNESIUM: CPT

## 2018-05-26 PROCEDURE — 85610 PROTHROMBIN TIME: CPT

## 2018-05-26 PROCEDURE — 1200000003 HC TELEMETRY R&B

## 2018-05-26 PROCEDURE — 84484 ASSAY OF TROPONIN QUANT: CPT

## 2018-05-26 PROCEDURE — 36415 COLL VENOUS BLD VENIPUNCTURE: CPT

## 2018-05-26 PROCEDURE — 99285 EMERGENCY DEPT VISIT HI MDM: CPT

## 2018-05-26 PROCEDURE — 93010 ELECTROCARDIOGRAM REPORT: CPT | Performed by: INTERNAL MEDICINE

## 2018-05-26 PROCEDURE — 84146 ASSAY OF PROLACTIN: CPT

## 2018-05-26 PROCEDURE — 93005 ELECTROCARDIOGRAM TRACING: CPT | Performed by: FAMILY MEDICINE

## 2018-05-26 PROCEDURE — 81001 URINALYSIS AUTO W/SCOPE: CPT

## 2018-05-26 PROCEDURE — 85025 COMPLETE CBC W/AUTO DIFF WBC: CPT

## 2018-05-26 PROCEDURE — 83880 ASSAY OF NATRIURETIC PEPTIDE: CPT

## 2018-05-26 PROCEDURE — 85379 FIBRIN DEGRADATION QUANT: CPT

## 2018-05-26 PROCEDURE — 2580000003 HC RX 258: Performed by: FAMILY MEDICINE

## 2018-05-26 PROCEDURE — 80053 COMPREHEN METABOLIC PANEL: CPT

## 2018-05-26 PROCEDURE — 82248 BILIRUBIN DIRECT: CPT

## 2018-05-26 RX ORDER — ACETAMINOPHEN 325 MG/1
650 TABLET ORAL ONCE
Status: DISCONTINUED | OUTPATIENT
Start: 2018-05-26 | End: 2018-05-26

## 2018-05-26 RX ORDER — LEVOTHYROXINE SODIUM 0.1 MG/1
200 TABLET ORAL DAILY
Status: CANCELLED | OUTPATIENT
Start: 2018-05-26

## 2018-05-26 RX ORDER — MONTELUKAST SODIUM 10 MG/1
10 TABLET ORAL NIGHTLY
Status: CANCELLED | OUTPATIENT
Start: 2018-05-26

## 2018-05-26 RX ORDER — POTASSIUM CHLORIDE 20MEQ/15ML
40 LIQUID (ML) ORAL PRN
Status: DISCONTINUED | OUTPATIENT
Start: 2018-05-26 | End: 2018-05-31 | Stop reason: HOSPADM

## 2018-05-26 RX ORDER — ESTRADIOL 1 MG/1
2 TABLET ORAL DAILY
Status: CANCELLED | OUTPATIENT
Start: 2018-05-26

## 2018-05-26 RX ORDER — LEVOTHYROXINE SODIUM 0.1 MG/1
200 TABLET ORAL DAILY
Status: CANCELLED | OUTPATIENT
Start: 2018-05-27

## 2018-05-26 RX ORDER — LAMOTRIGINE 100 MG/1
200 TABLET ORAL DAILY
Status: CANCELLED | OUTPATIENT
Start: 2018-05-26

## 2018-05-26 RX ORDER — ONDANSETRON 2 MG/ML
4 INJECTION INTRAMUSCULAR; INTRAVENOUS EVERY 6 HOURS PRN
Status: CANCELLED | OUTPATIENT
Start: 2018-05-26

## 2018-05-26 RX ORDER — BUTALBITAL, ACETAMINOPHEN AND CAFFEINE 50; 325; 40 MG/1; MG/1; MG/1
1 TABLET ORAL EVERY 6 HOURS PRN
Status: CANCELLED | OUTPATIENT
Start: 2018-05-26

## 2018-05-26 RX ORDER — SODIUM CHLORIDE 9 MG/ML
INJECTION, SOLUTION INTRAVENOUS CONTINUOUS
Status: CANCELLED | OUTPATIENT
Start: 2018-05-26

## 2018-05-26 RX ORDER — GABAPENTIN 300 MG/1
300 CAPSULE ORAL NIGHTLY
Status: CANCELLED | OUTPATIENT
Start: 2018-05-26

## 2018-05-26 RX ORDER — SODIUM CHLORIDE 0.9 % (FLUSH) 0.9 %
10 SYRINGE (ML) INJECTION EVERY 12 HOURS SCHEDULED
Status: CANCELLED | OUTPATIENT
Start: 2018-05-26

## 2018-05-26 RX ORDER — POTASSIUM CHLORIDE 7.45 MG/ML
10 INJECTION INTRAVENOUS PRN
Status: DISCONTINUED | OUTPATIENT
Start: 2018-05-26 | End: 2018-05-31 | Stop reason: HOSPADM

## 2018-05-26 RX ORDER — LEVOTHYROXINE SODIUM 0.03 MG/1
25 TABLET ORAL DAILY
Status: CANCELLED | OUTPATIENT
Start: 2018-05-26

## 2018-05-26 RX ORDER — ACETAMINOPHEN 500 MG
1000 TABLET ORAL ONCE
Status: COMPLETED | OUTPATIENT
Start: 2018-05-26 | End: 2018-05-26

## 2018-05-26 RX ORDER — ESCITALOPRAM OXALATE 20 MG/1
20 TABLET ORAL DAILY
Status: CANCELLED | OUTPATIENT
Start: 2018-05-26

## 2018-05-26 RX ORDER — POTASSIUM CHLORIDE 20 MEQ/1
20 TABLET, EXTENDED RELEASE ORAL
Status: CANCELLED | OUTPATIENT
Start: 2018-05-27

## 2018-05-26 RX ORDER — ACETAMINOPHEN 500 MG
TABLET ORAL
Status: COMPLETED
Start: 2018-05-26 | End: 2018-05-26

## 2018-05-26 RX ORDER — CHOLECALCIFEROL (VITAMIN D3) 125 MCG
5 CAPSULE ORAL NIGHTLY
Status: CANCELLED | OUTPATIENT
Start: 2018-05-26

## 2018-05-26 RX ORDER — ONDANSETRON 2 MG/ML
4 INJECTION INTRAMUSCULAR; INTRAVENOUS EVERY 6 HOURS PRN
Status: DISCONTINUED | OUTPATIENT
Start: 2018-05-26 | End: 2018-05-30

## 2018-05-26 RX ORDER — VALACYCLOVIR HYDROCHLORIDE 500 MG/1
500 TABLET, FILM COATED ORAL DAILY
Status: CANCELLED | OUTPATIENT
Start: 2018-05-26

## 2018-05-26 RX ORDER — POTASSIUM CHLORIDE 20 MEQ/1
40 TABLET, EXTENDED RELEASE ORAL PRN
Status: CANCELLED | OUTPATIENT
Start: 2018-05-26

## 2018-05-26 RX ORDER — POTASSIUM CHLORIDE 7.45 MG/ML
10 INJECTION INTRAVENOUS PRN
Status: CANCELLED | OUTPATIENT
Start: 2018-05-26

## 2018-05-26 RX ORDER — BUMETANIDE 1 MG/1
1 TABLET ORAL DAILY
Status: CANCELLED | OUTPATIENT
Start: 2018-05-26

## 2018-05-26 RX ORDER — GLIMEPIRIDE 1 MG/1
1 TABLET ORAL EVERY MORNING
Status: CANCELLED | OUTPATIENT
Start: 2018-05-27

## 2018-05-26 RX ORDER — ALBUTEROL SULFATE 90 UG/1
2 AEROSOL, METERED RESPIRATORY (INHALATION) EVERY 4 HOURS PRN
Status: CANCELLED | OUTPATIENT
Start: 2018-05-26

## 2018-05-26 RX ORDER — UREA 10 %
10 LOTION (ML) TOPICAL NIGHTLY PRN
Status: DISCONTINUED | OUTPATIENT
Start: 2018-05-26 | End: 2018-05-31 | Stop reason: HOSPADM

## 2018-05-26 RX ORDER — POTASSIUM CHLORIDE 20 MEQ/1
40 TABLET, EXTENDED RELEASE ORAL PRN
Status: DISCONTINUED | OUTPATIENT
Start: 2018-05-26 | End: 2018-05-31 | Stop reason: HOSPADM

## 2018-05-26 RX ORDER — POTASSIUM CHLORIDE 20MEQ/15ML
40 LIQUID (ML) ORAL PRN
Status: CANCELLED | OUTPATIENT
Start: 2018-05-26

## 2018-05-26 RX ORDER — LEVOTHYROXINE SODIUM 0.03 MG/1
25 TABLET ORAL DAILY
Status: CANCELLED | OUTPATIENT
Start: 2018-05-27

## 2018-05-26 RX ORDER — BUMETANIDE 1 MG/1
1 TABLET ORAL DAILY
Status: CANCELLED | OUTPATIENT
Start: 2018-05-27

## 2018-05-26 RX ORDER — SODIUM CHLORIDE 9 MG/ML
INJECTION, SOLUTION INTRAVENOUS CONTINUOUS
Status: DISCONTINUED | OUTPATIENT
Start: 2018-05-26 | End: 2018-05-31 | Stop reason: HOSPADM

## 2018-05-26 RX ORDER — SODIUM CHLORIDE 0.9 % (FLUSH) 0.9 %
10 SYRINGE (ML) INJECTION PRN
Status: DISCONTINUED | OUTPATIENT
Start: 2018-05-26 | End: 2018-05-31 | Stop reason: HOSPADM

## 2018-05-26 RX ORDER — ACETAMINOPHEN 325 MG/1
650 TABLET ORAL EVERY 4 HOURS PRN
Status: DISCONTINUED | OUTPATIENT
Start: 2018-05-26 | End: 2018-05-30

## 2018-05-26 RX ORDER — SODIUM CHLORIDE 0.9 % (FLUSH) 0.9 %
10 SYRINGE (ML) INJECTION EVERY 12 HOURS SCHEDULED
Status: DISCONTINUED | OUTPATIENT
Start: 2018-05-26 | End: 2018-05-31 | Stop reason: HOSPADM

## 2018-05-26 RX ORDER — NICOTINE 21 MG/24HR
1 PATCH, TRANSDERMAL 24 HOURS TRANSDERMAL DAILY
Status: DISCONTINUED | OUTPATIENT
Start: 2018-05-27 | End: 2018-05-31 | Stop reason: HOSPADM

## 2018-05-26 RX ORDER — SODIUM CHLORIDE 0.9 % (FLUSH) 0.9 %
10 SYRINGE (ML) INJECTION PRN
Status: CANCELLED | OUTPATIENT
Start: 2018-05-26

## 2018-05-26 RX ADMIN — SODIUM CHLORIDE: 9 INJECTION, SOLUTION INTRAVENOUS at 21:49

## 2018-05-26 RX ADMIN — ACETAMINOPHEN 1000 MG: 500 TABLET ORAL at 20:30

## 2018-05-26 RX ADMIN — Medication 1000 MG: at 20:30

## 2018-05-26 ASSESSMENT — PAIN DESCRIPTION - ORIENTATION
ORIENTATION: RIGHT;ANTERIOR
ORIENTATION: LEFT

## 2018-05-26 ASSESSMENT — ENCOUNTER SYMPTOMS
EYE DISCHARGE: 0
ABDOMINAL PAIN: 0
SHORTNESS OF BREATH: 1
NAUSEA: 0
DIARRHEA: 0
RHINORRHEA: 0
RHINORRHEA: 0
EYE ITCHING: 0
WHEEZING: 0
VOMITING: 0
COUGH: 0
ABDOMINAL DISTENTION: 0
NAUSEA: 1
COUGH: 0
BACK PAIN: 0
SORE THROAT: 0
EYE DISCHARGE: 0
DIARRHEA: 0
SHORTNESS OF BREATH: 0
WHEEZING: 0
VOMITING: 0
EYE PAIN: 0
ABDOMINAL PAIN: 0

## 2018-05-26 ASSESSMENT — PAIN DESCRIPTION - LOCATION
LOCATION: CHEST
LOCATION: HEAD

## 2018-05-26 ASSESSMENT — PAIN DESCRIPTION - PROGRESSION
CLINICAL_PROGRESSION: GRADUALLY IMPROVING
CLINICAL_PROGRESSION: NOT CHANGED
CLINICAL_PROGRESSION: GRADUALLY IMPROVING

## 2018-05-26 ASSESSMENT — PAIN SCALES - GENERAL
PAINLEVEL_OUTOF10: 5
PAINLEVEL_OUTOF10: 8
PAINLEVEL_OUTOF10: 4
PAINLEVEL_OUTOF10: 4
PAINLEVEL_OUTOF10: 3

## 2018-05-26 ASSESSMENT — PAIN DESCRIPTION - ONSET: ONSET: SUDDEN

## 2018-05-26 ASSESSMENT — PAIN DESCRIPTION - PAIN TYPE
TYPE: ACUTE PAIN
TYPE: ACUTE PAIN

## 2018-05-26 ASSESSMENT — PAIN DESCRIPTION - DESCRIPTORS
DESCRIPTORS: ACHING
DESCRIPTORS: CONSTANT;PRESSURE

## 2018-05-26 ASSESSMENT — PAIN DESCRIPTION - FREQUENCY
FREQUENCY: CONTINUOUS
FREQUENCY: CONTINUOUS

## 2018-05-27 ENCOUNTER — APPOINTMENT (OUTPATIENT)
Dept: INTERVENTIONAL RADIOLOGY/VASCULAR | Age: 43
DRG: 171 | End: 2018-05-27
Payer: COMMERCIAL

## 2018-05-27 ENCOUNTER — APPOINTMENT (OUTPATIENT)
Dept: CT IMAGING | Age: 43
DRG: 171 | End: 2018-05-27
Payer: COMMERCIAL

## 2018-05-27 LAB
ALBUMIN SERPL-MCNC: 3.6 G/DL (ref 3.5–5.1)
ALP BLD-CCNC: 71 U/L (ref 38–126)
ALT SERPL-CCNC: 9 U/L (ref 11–66)
AMORPHOUS: ABNORMAL
AMPHETAMINE+METHAMPHETAMINE URINE SCREEN: NEGATIVE
ANION GAP SERPL CALCULATED.3IONS-SCNC: 10 MEQ/L (ref 8–16)
AST SERPL-CCNC: 14 U/L (ref 5–40)
BACTERIA: ABNORMAL
BARBITURATE QUANTITATIVE URINE: NEGATIVE
BASOPHILS # BLD: 0.6 %
BASOPHILS ABSOLUTE: 0.1 THOU/MM3 (ref 0–0.1)
BENZODIAZEPINE QUANTITATIVE URINE: NEGATIVE
BILIRUB SERPL-MCNC: 0.4 MG/DL (ref 0.3–1.2)
BILIRUBIN URINE: ABNORMAL
BLOOD, URINE: NEGATIVE
BUN BLDV-MCNC: 14 MG/DL (ref 7–22)
CALCIUM SERPL-MCNC: 9 MG/DL (ref 8.5–10.5)
CANNABINOID QUANTITATIVE URINE: POSITIVE
CASTS: ABNORMAL /LPF
CASTS: ABNORMAL /LPF
CHARACTER, URINE: ABNORMAL
CHLORIDE BLD-SCNC: 104 MEQ/L (ref 98–111)
CO2: 25 MEQ/L (ref 23–33)
COCAINE METABOLITE QUANTITATIVE URINE: NEGATIVE
COLOR: YELLOW
CREAT SERPL-MCNC: 0.6 MG/DL (ref 0.4–1.2)
CRYSTALS: ABNORMAL
EKG ATRIAL RATE: 41 BPM
EKG ATRIAL RATE: 48 BPM
EKG ATRIAL RATE: 66 BPM
EKG P AXIS: 29 DEGREES
EKG P AXIS: 37 DEGREES
EKG P AXIS: 39 DEGREES
EKG P-R INTERVAL: 128 MS
EKG P-R INTERVAL: 148 MS
EKG P-R INTERVAL: 152 MS
EKG Q-T INTERVAL: 432 MS
EKG Q-T INTERVAL: 478 MS
EKG Q-T INTERVAL: 570 MS
EKG QRS DURATION: 96 MS
EKG QRS DURATION: 98 MS
EKG QRS DURATION: 98 MS
EKG QTC CALCULATION (BAZETT): 427 MS
EKG QTC CALCULATION (BAZETT): 452 MS
EKG QTC CALCULATION (BAZETT): 470 MS
EKG R AXIS: 34 DEGREES
EKG R AXIS: 50 DEGREES
EKG R AXIS: 61 DEGREES
EKG T AXIS: 108 DEGREES
EKG T AXIS: 46 DEGREES
EKG T AXIS: 94 DEGREES
EKG VENTRICULAR RATE: 41 BPM
EKG VENTRICULAR RATE: 48 BPM
EKG VENTRICULAR RATE: 66 BPM
EOSINOPHIL # BLD: 2.4 %
EOSINOPHILS ABSOLUTE: 0.2 THOU/MM3 (ref 0–0.4)
EPITHELIAL CELLS, UA: ABNORMAL /HPF
GFR SERPL CREATININE-BSD FRML MDRD: > 90 ML/MIN/1.73M2
GLUCOSE BLD-MCNC: 128 MG/DL (ref 70–108)
GLUCOSE BLD-MCNC: 144 MG/DL (ref 70–108)
GLUCOSE BLD-MCNC: 152 MG/DL (ref 70–108)
GLUCOSE BLD-MCNC: 154 MG/DL (ref 70–108)
GLUCOSE, URINE: NEGATIVE MG/DL
HCT VFR BLD CALC: 40.5 % (ref 37–47)
HEMOGLOBIN: 13.8 GM/DL (ref 12–16)
ICTOTEST: NEGATIVE
KETONES, URINE: NEGATIVE
LEUKOCYTE EST, POC: NEGATIVE
LYMPHOCYTES # BLD: 48 %
LYMPHOCYTES ABSOLUTE: 4.3 THOU/MM3 (ref 1–4.8)
MCH RBC QN AUTO: 30 PG (ref 27–31)
MCHC RBC AUTO-ENTMCNC: 34.1 GM/DL (ref 33–37)
MCV RBC AUTO: 87.9 FL (ref 81–99)
MISCELLANEOUS LAB TEST RESULT: ABNORMAL
MONOCYTES # BLD: 6.1 %
MONOCYTES ABSOLUTE: 0.5 THOU/MM3 (ref 0.4–1.3)
MUCUS: ABNORMAL
NITRITE, URINE: NEGATIVE
NUCLEATED RED BLOOD CELLS: 0 /100 WBC
OPIATES, URINE: NEGATIVE
ORGANISM: ABNORMAL
OSMOLALITY CALCULATION: 280.5 MOSMOL/KG (ref 275–300)
OXYCODONE: NEGATIVE
PDW BLD-RTO: 13.9 % (ref 11.5–14.5)
PH UA: 5.5
PHENCYCLIDINE QUANTITATIVE URINE: NEGATIVE
PLATELET # BLD: 244 THOU/MM3 (ref 130–400)
PMV BLD AUTO: 8.8 FL (ref 7.4–10.4)
POTASSIUM REFLEX MAGNESIUM: 3.6 MEQ/L (ref 3.5–5.2)
PROTEIN UA: NEGATIVE MG/DL
RBC # BLD: 4.61 MILL/MM3 (ref 4.2–5.4)
RBC URINE: ABNORMAL /HPF
RENAL EPITHELIAL, UA: ABNORMAL
SEG NEUTROPHILS: 42.9 %
SEGMENTED NEUTROPHILS ABSOLUTE COUNT: 3.9 THOU/MM3 (ref 1.8–7.7)
SODIUM BLD-SCNC: 139 MEQ/L (ref 135–145)
SPECIFIC GRAVITY UA: 1.02 (ref 1–1.03)
TOTAL PROTEIN: 6.5 G/DL (ref 6.1–8)
TROPONIN T: < 0.01 NG/ML
TROPONIN T: < 0.01 NG/ML
TSH SERPL DL<=0.05 MIU/L-ACNC: 1.77 UIU/ML (ref 0.4–4.2)
URINE CULTURE REFLEX: ABNORMAL
UROBILINOGEN, URINE: 1 EU/DL
WBC # BLD: 9 THOU/MM3 (ref 4.8–10.8)
WBC UA: ABNORMAL /HPF
YEAST: ABNORMAL

## 2018-05-27 PROCEDURE — 93010 ELECTROCARDIOGRAM REPORT: CPT | Performed by: INTERNAL MEDICINE

## 2018-05-27 PROCEDURE — 99223 1ST HOSP IP/OBS HIGH 75: CPT | Performed by: INTERNAL MEDICINE

## 2018-05-27 PROCEDURE — 36415 COLL VENOUS BLD VENIPUNCTURE: CPT

## 2018-05-27 PROCEDURE — 93005 ELECTROCARDIOGRAM TRACING: CPT | Performed by: FAMILY MEDICINE

## 2018-05-27 PROCEDURE — 80053 COMPREHEN METABOLIC PANEL: CPT

## 2018-05-27 PROCEDURE — 93880 EXTRACRANIAL BILAT STUDY: CPT

## 2018-05-27 PROCEDURE — 81001 URINALYSIS AUTO W/SCOPE: CPT

## 2018-05-27 PROCEDURE — 6370000000 HC RX 637 (ALT 250 FOR IP): Performed by: FAMILY MEDICINE

## 2018-05-27 PROCEDURE — 6370000000 HC RX 637 (ALT 250 FOR IP): Performed by: INTERNAL MEDICINE

## 2018-05-27 PROCEDURE — 2580000003 HC RX 258: Performed by: FAMILY MEDICINE

## 2018-05-27 PROCEDURE — 84443 ASSAY THYROID STIM HORMONE: CPT

## 2018-05-27 PROCEDURE — 85025 COMPLETE CBC W/AUTO DIFF WBC: CPT

## 2018-05-27 PROCEDURE — 6360000002 HC RX W HCPCS: Performed by: FAMILY MEDICINE

## 2018-05-27 PROCEDURE — 84484 ASSAY OF TROPONIN QUANT: CPT

## 2018-05-27 PROCEDURE — 80307 DRUG TEST PRSMV CHEM ANLYZR: CPT

## 2018-05-27 PROCEDURE — 1200000003 HC TELEMETRY R&B

## 2018-05-27 PROCEDURE — 70450 CT HEAD/BRAIN W/O DYE: CPT

## 2018-05-27 PROCEDURE — 82948 REAGENT STRIP/BLOOD GLUCOSE: CPT

## 2018-05-27 RX ORDER — GABAPENTIN 300 MG/1
300 CAPSULE ORAL NIGHTLY
Status: DISCONTINUED | OUTPATIENT
Start: 2018-05-27 | End: 2018-05-31 | Stop reason: HOSPADM

## 2018-05-27 RX ORDER — MONTELUKAST SODIUM 10 MG/1
10 TABLET ORAL NIGHTLY
Status: DISCONTINUED | OUTPATIENT
Start: 2018-05-27 | End: 2018-05-31 | Stop reason: HOSPADM

## 2018-05-27 RX ORDER — NICOTINE POLACRILEX 4 MG
15 LOZENGE BUCCAL PRN
Status: DISCONTINUED | OUTPATIENT
Start: 2018-05-27 | End: 2018-05-31 | Stop reason: HOSPADM

## 2018-05-27 RX ORDER — SODIUM CHLORIDE 1000 MG
1 TABLET, SOLUBLE MISCELLANEOUS
Status: DISCONTINUED | OUTPATIENT
Start: 2018-05-27 | End: 2018-05-31 | Stop reason: HOSPADM

## 2018-05-27 RX ORDER — ESTRADIOL 1 MG/1
2 TABLET ORAL DAILY
Status: DISCONTINUED | OUTPATIENT
Start: 2018-05-27 | End: 2018-05-27

## 2018-05-27 RX ORDER — LEVOTHYROXINE SODIUM 0.1 MG/1
200 TABLET ORAL DAILY
Status: DISCONTINUED | OUTPATIENT
Start: 2018-05-27 | End: 2018-05-31 | Stop reason: HOSPADM

## 2018-05-27 RX ORDER — DEXTROSE MONOHYDRATE 25 G/50ML
12.5 INJECTION, SOLUTION INTRAVENOUS PRN
Status: DISCONTINUED | OUTPATIENT
Start: 2018-05-27 | End: 2018-05-31 | Stop reason: HOSPADM

## 2018-05-27 RX ORDER — VALACYCLOVIR HYDROCHLORIDE 500 MG/1
500 TABLET, FILM COATED ORAL DAILY
Status: DISCONTINUED | OUTPATIENT
Start: 2018-05-27 | End: 2018-05-27

## 2018-05-27 RX ORDER — BUTALBITAL, ACETAMINOPHEN AND CAFFEINE 50; 325; 40 MG/1; MG/1; MG/1
1 TABLET ORAL EVERY 6 HOURS PRN
Status: DISCONTINUED | OUTPATIENT
Start: 2018-05-27 | End: 2018-05-31 | Stop reason: HOSPADM

## 2018-05-27 RX ORDER — LAMOTRIGINE 100 MG/1
200 TABLET ORAL DAILY
Status: DISCONTINUED | OUTPATIENT
Start: 2018-05-27 | End: 2018-05-31 | Stop reason: HOSPADM

## 2018-05-27 RX ORDER — DEXTROSE MONOHYDRATE 50 MG/ML
100 INJECTION, SOLUTION INTRAVENOUS PRN
Status: DISCONTINUED | OUTPATIENT
Start: 2018-05-27 | End: 2018-05-31 | Stop reason: HOSPADM

## 2018-05-27 RX ORDER — ESCITALOPRAM OXALATE 20 MG/1
20 TABLET ORAL DAILY
Status: DISCONTINUED | OUTPATIENT
Start: 2018-05-27 | End: 2018-05-31 | Stop reason: HOSPADM

## 2018-05-27 RX ORDER — LEVOTHYROXINE SODIUM 0.03 MG/1
25 TABLET ORAL DAILY
Status: DISCONTINUED | OUTPATIENT
Start: 2018-05-27 | End: 2018-05-31 | Stop reason: HOSPADM

## 2018-05-27 RX ORDER — POTASSIUM CHLORIDE 20 MEQ/1
20 TABLET, EXTENDED RELEASE ORAL
Status: DISCONTINUED | OUTPATIENT
Start: 2018-05-27 | End: 2018-05-27

## 2018-05-27 RX ADMIN — LEVOTHYROXINE SODIUM 200 MCG: 100 TABLET ORAL at 12:37

## 2018-05-27 RX ADMIN — GABAPENTIN 300 MG: 300 CAPSULE ORAL at 21:40

## 2018-05-27 RX ADMIN — SODIUM CHLORIDE TAB 1 GM 1 G: 1 TAB at 12:37

## 2018-05-27 RX ADMIN — LEVOTHYROXINE SODIUM 25 MCG: 25 TABLET ORAL at 12:38

## 2018-05-27 RX ADMIN — SODIUM CHLORIDE TAB 1 GM 1 G: 1 TAB at 18:14

## 2018-05-27 RX ADMIN — ESCITALOPRAM 20 MG: 20 TABLET, FILM COATED ORAL at 12:38

## 2018-05-27 RX ADMIN — ONDANSETRON 4 MG: 2 INJECTION INTRAMUSCULAR; INTRAVENOUS at 18:10

## 2018-05-27 RX ADMIN — Medication 10 MG: at 21:41

## 2018-05-27 RX ADMIN — ACETAMINOPHEN 650 MG: 325 TABLET ORAL at 23:03

## 2018-05-27 RX ADMIN — MONTELUKAST SODIUM 10 MG: 10 TABLET, FILM COATED ORAL at 21:40

## 2018-05-27 RX ADMIN — SODIUM CHLORIDE: 9 INJECTION, SOLUTION INTRAVENOUS at 07:42

## 2018-05-27 RX ADMIN — LAMOTRIGINE 200 MG: 100 TABLET ORAL at 12:38

## 2018-05-27 ASSESSMENT — PAIN DESCRIPTION - PROGRESSION: CLINICAL_PROGRESSION: GRADUALLY IMPROVING

## 2018-05-27 ASSESSMENT — PAIN DESCRIPTION - ONSET: ONSET: AWAKENED FROM SLEEP

## 2018-05-27 ASSESSMENT — PAIN SCALES - GENERAL
PAINLEVEL_OUTOF10: 0
PAINLEVEL_OUTOF10: 6
PAINLEVEL_OUTOF10: 0
PAINLEVEL_OUTOF10: 0

## 2018-05-27 ASSESSMENT — PAIN DESCRIPTION - ORIENTATION: ORIENTATION: MID

## 2018-05-27 ASSESSMENT — PAIN DESCRIPTION - DESCRIPTORS: DESCRIPTORS: ACHING;SHARP;STABBING

## 2018-05-27 ASSESSMENT — PAIN DESCRIPTION - PAIN TYPE: TYPE: ACUTE PAIN

## 2018-05-27 ASSESSMENT — PAIN DESCRIPTION - FREQUENCY: FREQUENCY: INTERMITTENT

## 2018-05-27 ASSESSMENT — PAIN DESCRIPTION - LOCATION: LOCATION: CHEST

## 2018-05-28 LAB
EKG ATRIAL RATE: 40 BPM
EKG P AXIS: 35 DEGREES
EKG P-R INTERVAL: 136 MS
EKG Q-T INTERVAL: 510 MS
EKG QRS DURATION: 96 MS
EKG QTC CALCULATION (BAZETT): 415 MS
EKG R AXIS: 60 DEGREES
EKG T AXIS: 72 DEGREES
EKG VENTRICULAR RATE: 40 BPM
GLUCOSE BLD-MCNC: 118 MG/DL (ref 70–108)
GLUCOSE BLD-MCNC: 121 MG/DL (ref 70–108)
GLUCOSE BLD-MCNC: 147 MG/DL (ref 70–108)
GLUCOSE BLD-MCNC: 162 MG/DL (ref 70–108)

## 2018-05-28 PROCEDURE — 1200000003 HC TELEMETRY R&B

## 2018-05-28 PROCEDURE — 82948 REAGENT STRIP/BLOOD GLUCOSE: CPT

## 2018-05-28 PROCEDURE — 6360000002 HC RX W HCPCS: Performed by: FAMILY MEDICINE

## 2018-05-28 PROCEDURE — 6370000000 HC RX 637 (ALT 250 FOR IP): Performed by: INTERNAL MEDICINE

## 2018-05-28 PROCEDURE — 2580000003 HC RX 258: Performed by: FAMILY MEDICINE

## 2018-05-28 PROCEDURE — 99231 SBSQ HOSP IP/OBS SF/LOW 25: CPT | Performed by: PHYSICIAN ASSISTANT

## 2018-05-28 PROCEDURE — 6370000000 HC RX 637 (ALT 250 FOR IP): Performed by: FAMILY MEDICINE

## 2018-05-28 RX ADMIN — Medication 1 UNITS: at 16:51

## 2018-05-28 RX ADMIN — ONDANSETRON 4 MG: 2 INJECTION INTRAMUSCULAR; INTRAVENOUS at 13:02

## 2018-05-28 RX ADMIN — GABAPENTIN 300 MG: 300 CAPSULE ORAL at 20:25

## 2018-05-28 RX ADMIN — Medication 10 MG: at 22:23

## 2018-05-28 RX ADMIN — MONTELUKAST SODIUM 10 MG: 10 TABLET, FILM COATED ORAL at 20:25

## 2018-05-28 RX ADMIN — ACETAMINOPHEN 650 MG: 325 TABLET ORAL at 22:23

## 2018-05-28 RX ADMIN — SODIUM CHLORIDE: 9 INJECTION, SOLUTION INTRAVENOUS at 22:16

## 2018-05-28 RX ADMIN — LEVOTHYROXINE SODIUM 200 MCG: 100 TABLET ORAL at 09:08

## 2018-05-28 RX ADMIN — SODIUM CHLORIDE: 9 INJECTION, SOLUTION INTRAVENOUS at 09:14

## 2018-05-28 RX ADMIN — SODIUM CHLORIDE TAB 1 GM 1 G: 1 TAB at 09:06

## 2018-05-28 RX ADMIN — LAMOTRIGINE 200 MG: 100 TABLET ORAL at 09:07

## 2018-05-28 RX ADMIN — LEVOTHYROXINE SODIUM 25 MCG: 25 TABLET ORAL at 09:07

## 2018-05-28 RX ADMIN — ESCITALOPRAM 20 MG: 20 TABLET, FILM COATED ORAL at 09:07

## 2018-05-28 RX ADMIN — Medication 10 ML: at 09:07

## 2018-05-28 ASSESSMENT — PAIN SCALES - GENERAL
PAINLEVEL_OUTOF10: 0
PAINLEVEL_OUTOF10: 9
PAINLEVEL_OUTOF10: 6
PAINLEVEL_OUTOF10: 0
PAINLEVEL_OUTOF10: 0

## 2018-05-29 ENCOUNTER — TELEPHONE (OUTPATIENT)
Dept: FAMILY MEDICINE CLINIC | Age: 43
End: 2018-05-29

## 2018-05-29 LAB
GLUCOSE BLD-MCNC: 113 MG/DL (ref 70–108)
GLUCOSE BLD-MCNC: 126 MG/DL (ref 70–108)
GLUCOSE BLD-MCNC: 147 MG/DL (ref 70–108)
GLUCOSE BLD-MCNC: 247 MG/DL (ref 70–108)

## 2018-05-29 PROCEDURE — 1200000003 HC TELEMETRY R&B

## 2018-05-29 PROCEDURE — 6370000000 HC RX 637 (ALT 250 FOR IP): Performed by: INTERNAL MEDICINE

## 2018-05-29 PROCEDURE — 6370000000 HC RX 637 (ALT 250 FOR IP): Performed by: FAMILY MEDICINE

## 2018-05-29 PROCEDURE — 2580000003 HC RX 258: Performed by: FAMILY MEDICINE

## 2018-05-29 PROCEDURE — 82948 REAGENT STRIP/BLOOD GLUCOSE: CPT

## 2018-05-29 PROCEDURE — 6360000002 HC RX W HCPCS: Performed by: FAMILY MEDICINE

## 2018-05-29 PROCEDURE — 99255 IP/OBS CONSLTJ NEW/EST HI 80: CPT | Performed by: INTERNAL MEDICINE

## 2018-05-29 RX ADMIN — ACETAMINOPHEN 650 MG: 325 TABLET ORAL at 04:39

## 2018-05-29 RX ADMIN — SODIUM CHLORIDE: 9 INJECTION, SOLUTION INTRAVENOUS at 13:48

## 2018-05-29 RX ADMIN — MONTELUKAST SODIUM 10 MG: 10 TABLET, FILM COATED ORAL at 20:46

## 2018-05-29 RX ADMIN — Medication 1 UNITS: at 18:26

## 2018-05-29 RX ADMIN — ACETAMINOPHEN 650 MG: 325 TABLET ORAL at 14:40

## 2018-05-29 RX ADMIN — ESCITALOPRAM 20 MG: 20 TABLET, FILM COATED ORAL at 08:34

## 2018-05-29 RX ADMIN — ONDANSETRON 4 MG: 2 INJECTION INTRAMUSCULAR; INTRAVENOUS at 09:06

## 2018-05-29 RX ADMIN — GABAPENTIN 300 MG: 300 CAPSULE ORAL at 20:46

## 2018-05-29 RX ADMIN — LAMOTRIGINE 200 MG: 100 TABLET ORAL at 08:34

## 2018-05-29 RX ADMIN — LEVOTHYROXINE SODIUM 25 MCG: 25 TABLET ORAL at 06:20

## 2018-05-29 RX ADMIN — Medication 10 MG: at 20:46

## 2018-05-29 RX ADMIN — SODIUM CHLORIDE TAB 1 GM 1 G: 1 TAB at 18:26

## 2018-05-29 RX ADMIN — LEVOTHYROXINE SODIUM 200 MCG: 100 TABLET ORAL at 06:20

## 2018-05-29 RX ADMIN — SODIUM CHLORIDE TAB 1 GM 1 G: 1 TAB at 08:34

## 2018-05-29 ASSESSMENT — PAIN SCALES - GENERAL
PAINLEVEL_OUTOF10: 7
PAINLEVEL_OUTOF10: 8
PAINLEVEL_OUTOF10: 8
PAINLEVEL_OUTOF10: 6
PAINLEVEL_OUTOF10: 8
PAINLEVEL_OUTOF10: 0
PAINLEVEL_OUTOF10: 8
PAINLEVEL_OUTOF10: 2

## 2018-05-29 ASSESSMENT — PAIN DESCRIPTION - ORIENTATION: ORIENTATION: RIGHT

## 2018-05-29 ASSESSMENT — PAIN DESCRIPTION - DESCRIPTORS: DESCRIPTORS: SHARP

## 2018-05-29 ASSESSMENT — ENCOUNTER SYMPTOMS
BLURRED VISION: 0
DIARRHEA: 0
WHEEZING: 0
COUGH: 0
NAUSEA: 0
LEFT EYE: 0
ABDOMINAL PAIN: 0
SHORTNESS OF BREATH: 0
BACK PAIN: 0
DOUBLE VISION: 0
VOMITING: 0
RIGHT EYE: 0
SORE THROAT: 0
CONSTIPATION: 0

## 2018-05-29 ASSESSMENT — PAIN DESCRIPTION - PAIN TYPE: TYPE: ACUTE PAIN

## 2018-05-29 ASSESSMENT — PAIN DESCRIPTION - LOCATION: LOCATION: HIP

## 2018-05-30 ENCOUNTER — APPOINTMENT (OUTPATIENT)
Dept: GENERAL RADIOLOGY | Age: 43
DRG: 171 | End: 2018-05-30
Payer: COMMERCIAL

## 2018-05-30 ENCOUNTER — ANESTHESIA (OUTPATIENT)
Dept: CARDIAC CATH/INVASIVE PROCEDURES | Age: 43
DRG: 171 | End: 2018-05-30
Payer: COMMERCIAL

## 2018-05-30 ENCOUNTER — APPOINTMENT (OUTPATIENT)
Dept: CARDIAC CATH/INVASIVE PROCEDURES | Age: 43
DRG: 171 | End: 2018-05-30
Payer: COMMERCIAL

## 2018-05-30 ENCOUNTER — ANESTHESIA EVENT (OUTPATIENT)
Dept: CARDIAC CATH/INVASIVE PROCEDURES | Age: 43
DRG: 171 | End: 2018-05-30
Payer: COMMERCIAL

## 2018-05-30 VITALS — TEMPERATURE: 97.5 F | DIASTOLIC BLOOD PRESSURE: 56 MMHG | SYSTOLIC BLOOD PRESSURE: 118 MMHG | OXYGEN SATURATION: 92 %

## 2018-05-30 LAB
GLUCOSE BLD-MCNC: 133 MG/DL (ref 70–108)
GLUCOSE BLD-MCNC: 166 MG/DL (ref 70–108)
GLUCOSE BLD-MCNC: 168 MG/DL (ref 70–108)
GLUCOSE BLD-MCNC: 184 MG/DL (ref 70–108)

## 2018-05-30 PROCEDURE — 2500000003 HC RX 250 WO HCPCS: Performed by: NURSE ANESTHETIST, CERTIFIED REGISTERED

## 2018-05-30 PROCEDURE — C1786 PMKR, SINGLE, RATE-RESP: HCPCS

## 2018-05-30 PROCEDURE — 0JPT0PZ REMOVAL OF CARDIAC RHYTHM RELATED DEVICE FROM TRUNK SUBCUTANEOUS TISSUE AND FASCIA, OPEN APPROACH: ICD-10-PCS | Performed by: FAMILY MEDICINE

## 2018-05-30 PROCEDURE — 2580000003 HC RX 258: Performed by: FAMILY MEDICINE

## 2018-05-30 PROCEDURE — 1200000003 HC TELEMETRY R&B

## 2018-05-30 PROCEDURE — 3700000001 HC ADD 15 MINUTES (ANESTHESIA)

## 2018-05-30 PROCEDURE — 33206 INSERT HEART PM ATRIAL: CPT

## 2018-05-30 PROCEDURE — 6360000002 HC RX W HCPCS: Performed by: NURSE ANESTHETIST, CERTIFIED REGISTERED

## 2018-05-30 PROCEDURE — 82948 REAGENT STRIP/BLOOD GLUCOSE: CPT

## 2018-05-30 PROCEDURE — 02H63JZ INSERTION OF PACEMAKER LEAD INTO RIGHT ATRIUM, PERCUTANEOUS APPROACH: ICD-10-PCS | Performed by: INTERNAL MEDICINE

## 2018-05-30 PROCEDURE — 6370000000 HC RX 637 (ALT 250 FOR IP): Performed by: INTERNAL MEDICINE

## 2018-05-30 PROCEDURE — 71045 X-RAY EXAM CHEST 1 VIEW: CPT

## 2018-05-30 PROCEDURE — 33284 HC REMOVAL OF RECORDER: CPT

## 2018-05-30 PROCEDURE — 33284 PR RMVL IMPLANTABLE PT-ACTIVATED CAR EVENT RECORDER: CPT | Performed by: INTERNAL MEDICINE

## 2018-05-30 PROCEDURE — 0JH604Z INSERTION OF PACEMAKER, SINGLE CHAMBER INTO CHEST SUBCUTANEOUS TISSUE AND FASCIA, OPEN APPROACH: ICD-10-PCS | Performed by: INTERNAL MEDICINE

## 2018-05-30 PROCEDURE — 3700000000 HC ANESTHESIA ATTENDED CARE

## 2018-05-30 PROCEDURE — 6360000002 HC RX W HCPCS: Performed by: INTERNAL MEDICINE

## 2018-05-30 PROCEDURE — 2580000003 HC RX 258: Performed by: NURSE ANESTHETIST, CERTIFIED REGISTERED

## 2018-05-30 PROCEDURE — 7100000000 HC PACU RECOVERY - FIRST 15 MIN

## 2018-05-30 PROCEDURE — 6370000000 HC RX 637 (ALT 250 FOR IP): Performed by: FAMILY MEDICINE

## 2018-05-30 PROCEDURE — C1898 LEAD, PMKR, OTHER THAN TRANS: HCPCS

## 2018-05-30 PROCEDURE — 2580000003 HC RX 258: Performed by: INTERNAL MEDICINE

## 2018-05-30 PROCEDURE — 7100000001 HC PACU RECOVERY - ADDTL 15 MIN

## 2018-05-30 PROCEDURE — 6370000000 HC RX 637 (ALT 250 FOR IP)

## 2018-05-30 PROCEDURE — C1894 INTRO/SHEATH, NON-LASER: HCPCS

## 2018-05-30 PROCEDURE — 33206 INSERT HEART PM ATRIAL: CPT | Performed by: INTERNAL MEDICINE

## 2018-05-30 PROCEDURE — 94640 AIRWAY INHALATION TREATMENT: CPT

## 2018-05-30 PROCEDURE — 6370000000 HC RX 637 (ALT 250 FOR IP): Performed by: NURSE ANESTHETIST, CERTIFIED REGISTERED

## 2018-05-30 PROCEDURE — 6360000002 HC RX W HCPCS: Performed by: FAMILY MEDICINE

## 2018-05-30 RX ORDER — MIDAZOLAM HYDROCHLORIDE 1 MG/ML
INJECTION INTRAMUSCULAR; INTRAVENOUS PRN
Status: DISCONTINUED | OUTPATIENT
Start: 2018-05-30 | End: 2018-05-30 | Stop reason: SDUPTHER

## 2018-05-30 RX ORDER — ONDANSETRON 2 MG/ML
4 INJECTION INTRAMUSCULAR; INTRAVENOUS EVERY 6 HOURS PRN
Status: DISCONTINUED | OUTPATIENT
Start: 2018-05-30 | End: 2018-05-31 | Stop reason: HOSPADM

## 2018-05-30 RX ORDER — IPRATROPIUM BROMIDE AND ALBUTEROL SULFATE 2.5; .5 MG/3ML; MG/3ML
SOLUTION RESPIRATORY (INHALATION)
Status: COMPLETED
Start: 2018-05-30 | End: 2018-05-30

## 2018-05-30 RX ORDER — EPHEDRINE SULFATE 50 MG/ML
INJECTION INTRAVENOUS PRN
Status: DISCONTINUED | OUTPATIENT
Start: 2018-05-30 | End: 2018-05-30 | Stop reason: SDUPTHER

## 2018-05-30 RX ORDER — IPRATROPIUM BROMIDE AND ALBUTEROL SULFATE 2.5; .5 MG/3ML; MG/3ML
1 SOLUTION RESPIRATORY (INHALATION) ONCE
Status: COMPLETED | OUTPATIENT
Start: 2018-05-30 | End: 2018-05-30

## 2018-05-30 RX ORDER — ONDANSETRON 2 MG/ML
INJECTION INTRAMUSCULAR; INTRAVENOUS PRN
Status: DISCONTINUED | OUTPATIENT
Start: 2018-05-30 | End: 2018-05-30 | Stop reason: SDUPTHER

## 2018-05-30 RX ORDER — FENTANYL CITRATE 50 UG/ML
INJECTION, SOLUTION INTRAMUSCULAR; INTRAVENOUS PRN
Status: DISCONTINUED | OUTPATIENT
Start: 2018-05-30 | End: 2018-05-30 | Stop reason: SDUPTHER

## 2018-05-30 RX ORDER — OXYCODONE HYDROCHLORIDE AND ACETAMINOPHEN 5; 325 MG/1; MG/1
2 TABLET ORAL ONCE
Status: COMPLETED | OUTPATIENT
Start: 2018-05-30 | End: 2018-05-30

## 2018-05-30 RX ORDER — LIDOCAINE HYDROCHLORIDE 20 MG/ML
INJECTION, SOLUTION EPIDURAL; INFILTRATION; INTRACAUDAL; PERINEURAL PRN
Status: DISCONTINUED | OUTPATIENT
Start: 2018-05-30 | End: 2018-05-30 | Stop reason: SDUPTHER

## 2018-05-30 RX ORDER — ALBUTEROL SULFATE 90 UG/1
AEROSOL, METERED RESPIRATORY (INHALATION) PRN
Status: DISCONTINUED | OUTPATIENT
Start: 2018-05-30 | End: 2018-05-30 | Stop reason: SDUPTHER

## 2018-05-30 RX ORDER — PROPOFOL 10 MG/ML
INJECTION, EMULSION INTRAVENOUS PRN
Status: DISCONTINUED | OUTPATIENT
Start: 2018-05-30 | End: 2018-05-30 | Stop reason: SDUPTHER

## 2018-05-30 RX ORDER — SODIUM CHLORIDE 9 MG/ML
INJECTION, SOLUTION INTRAVENOUS CONTINUOUS PRN
Status: DISCONTINUED | OUTPATIENT
Start: 2018-05-30 | End: 2018-05-30 | Stop reason: SDUPTHER

## 2018-05-30 RX ORDER — OXYCODONE HYDROCHLORIDE AND ACETAMINOPHEN 5; 325 MG/1; MG/1
TABLET ORAL
Status: COMPLETED
Start: 2018-05-30 | End: 2018-05-30

## 2018-05-30 RX ORDER — SUCCINYLCHOLINE CHLORIDE 20 MG/ML
INJECTION INTRAMUSCULAR; INTRAVENOUS PRN
Status: DISCONTINUED | OUTPATIENT
Start: 2018-05-30 | End: 2018-05-30 | Stop reason: SDUPTHER

## 2018-05-30 RX ORDER — ACETAMINOPHEN 325 MG/1
650 TABLET ORAL EVERY 4 HOURS PRN
Status: DISCONTINUED | OUTPATIENT
Start: 2018-05-30 | End: 2018-05-31 | Stop reason: HOSPADM

## 2018-05-30 RX ADMIN — OXYCODONE HYDROCHLORIDE AND ACETAMINOPHEN 2 TABLET: 5; 325 TABLET ORAL at 11:00

## 2018-05-30 RX ADMIN — CEFAZOLIN SODIUM 2 G: 10 INJECTION, POWDER, FOR SOLUTION INTRAVENOUS at 17:05

## 2018-05-30 RX ADMIN — FENTANYL CITRATE 25 MCG: 50 INJECTION INTRAMUSCULAR; INTRAVENOUS at 08:49

## 2018-05-30 RX ADMIN — FENTANYL CITRATE 25 MCG: 50 INJECTION INTRAMUSCULAR; INTRAVENOUS at 10:30

## 2018-05-30 RX ADMIN — LEVOTHYROXINE SODIUM 25 MCG: 25 TABLET ORAL at 06:33

## 2018-05-30 RX ADMIN — LEVOTHYROXINE SODIUM 200 MCG: 100 TABLET ORAL at 06:31

## 2018-05-30 RX ADMIN — PHENYLEPHRINE HYDROCHLORIDE 50 MCG: 10 INJECTION INTRAVENOUS at 09:45

## 2018-05-30 RX ADMIN — ALBUTEROL SULFATE 4 PUFF: 90 AEROSOL, METERED RESPIRATORY (INHALATION) at 08:48

## 2018-05-30 RX ADMIN — PHENYLEPHRINE HYDROCHLORIDE 100 MCG: 10 INJECTION INTRAVENOUS at 09:48

## 2018-05-30 RX ADMIN — ONDANSETRON HYDROCHLORIDE 4 MG: 4 INJECTION, SOLUTION INTRAMUSCULAR; INTRAVENOUS at 10:10

## 2018-05-30 RX ADMIN — PHENYLEPHRINE HYDROCHLORIDE 200 MCG: 10 INJECTION INTRAVENOUS at 09:53

## 2018-05-30 RX ADMIN — ACETAMINOPHEN 650 MG: 325 TABLET ORAL at 16:56

## 2018-05-30 RX ADMIN — SODIUM CHLORIDE: 9 INJECTION, SOLUTION INTRAVENOUS at 05:05

## 2018-05-30 RX ADMIN — IPRATROPIUM BROMIDE AND ALBUTEROL SULFATE 1 AMPULE: .5; 3 SOLUTION RESPIRATORY (INHALATION) at 10:35

## 2018-05-30 RX ADMIN — ONDANSETRON 4 MG: 2 INJECTION INTRAMUSCULAR; INTRAVENOUS at 14:11

## 2018-05-30 RX ADMIN — MIDAZOLAM HYDROCHLORIDE 2 MG: 1 INJECTION, SOLUTION INTRAMUSCULAR; INTRAVENOUS at 08:31

## 2018-05-30 RX ADMIN — PROPOFOL 50 MG: 10 INJECTION, EMULSION INTRAVENOUS at 08:41

## 2018-05-30 RX ADMIN — FENTANYL CITRATE 25 MCG: 50 INJECTION INTRAMUSCULAR; INTRAVENOUS at 09:23

## 2018-05-30 RX ADMIN — ALBUTEROL SULFATE 4 PUFF: 90 AEROSOL, METERED RESPIRATORY (INHALATION) at 10:15

## 2018-05-30 RX ADMIN — Medication 80 MG: at 09:12

## 2018-05-30 RX ADMIN — SODIUM CHLORIDE: 9 INJECTION, SOLUTION INTRAVENOUS at 08:37

## 2018-05-30 RX ADMIN — ONDANSETRON 4 MG: 2 INJECTION INTRAMUSCULAR; INTRAVENOUS at 05:16

## 2018-05-30 RX ADMIN — SODIUM CHLORIDE: 9 INJECTION, SOLUTION INTRAVENOUS at 14:33

## 2018-05-30 RX ADMIN — CEFAZOLIN SODIUM 2 G: 10 INJECTION, POWDER, FOR SOLUTION INTRAVENOUS at 07:41

## 2018-05-30 RX ADMIN — IPRATROPIUM BROMIDE AND ALBUTEROL SULFATE 1 AMPULE: 2.5; .5 SOLUTION RESPIRATORY (INHALATION) at 10:35

## 2018-05-30 RX ADMIN — EPHEDRINE SULFATE 10 MG: 50 INJECTION, SOLUTION INTRAVENOUS at 10:03

## 2018-05-30 RX ADMIN — LIDOCAINE HYDROCHLORIDE 40 MG: 20 INJECTION, SOLUTION EPIDURAL; INFILTRATION; INTRACAUDAL; PERINEURAL at 09:09

## 2018-05-30 RX ADMIN — PROPOFOL 100 MG: 10 INJECTION, EMULSION INTRAVENOUS at 08:40

## 2018-05-30 RX ADMIN — FENTANYL CITRATE 25 MCG: 50 INJECTION INTRAMUSCULAR; INTRAVENOUS at 08:39

## 2018-05-30 ASSESSMENT — PAIN SCALES - GENERAL
PAINLEVEL_OUTOF10: 2
PAINLEVEL_OUTOF10: 6
PAINLEVEL_OUTOF10: 6
PAINLEVEL_OUTOF10: 0
PAINLEVEL_OUTOF10: 4
PAINLEVEL_OUTOF10: 9
PAINLEVEL_OUTOF10: 0
PAINLEVEL_OUTOF10: 3
PAINLEVEL_OUTOF10: 9

## 2018-05-30 ASSESSMENT — PULMONARY FUNCTION TESTS
PIF_VALUE: 27
PIF_VALUE: 9
PIF_VALUE: 3
PIF_VALUE: 3
PIF_VALUE: 33
PIF_VALUE: 9
PIF_VALUE: 2
PIF_VALUE: 3
PIF_VALUE: 3
PIF_VALUE: 6
PIF_VALUE: 2
PIF_VALUE: 3
PIF_VALUE: 0
PIF_VALUE: 3
PIF_VALUE: 1
PIF_VALUE: 21
PIF_VALUE: 2
PIF_VALUE: 8
PIF_VALUE: 7
PIF_VALUE: 27
PIF_VALUE: 3
PIF_VALUE: 2
PIF_VALUE: 1
PIF_VALUE: 4
PIF_VALUE: 3
PIF_VALUE: 9
PIF_VALUE: 1
PIF_VALUE: 2
PIF_VALUE: 4
PIF_VALUE: 9
PIF_VALUE: 40
PIF_VALUE: 3
PIF_VALUE: 2
PIF_VALUE: 8
PIF_VALUE: 3
PIF_VALUE: 3
PIF_VALUE: 1
PIF_VALUE: 3
PIF_VALUE: 2
PIF_VALUE: 3
PIF_VALUE: 2
PIF_VALUE: 1
PIF_VALUE: 2
PIF_VALUE: 8
PIF_VALUE: 3
PIF_VALUE: 18
PIF_VALUE: 1
PIF_VALUE: 2
PIF_VALUE: 28
PIF_VALUE: 3
PIF_VALUE: 1
PIF_VALUE: 9
PIF_VALUE: 2
PIF_VALUE: 3
PIF_VALUE: 1
PIF_VALUE: 9
PIF_VALUE: 2
PIF_VALUE: 9
PIF_VALUE: 9
PIF_VALUE: 2
PIF_VALUE: 3
PIF_VALUE: 3
PIF_VALUE: 1
PIF_VALUE: 2
PIF_VALUE: 16
PIF_VALUE: 2
PIF_VALUE: 9
PIF_VALUE: 2
PIF_VALUE: 3
PIF_VALUE: 37
PIF_VALUE: 3
PIF_VALUE: 2
PIF_VALUE: 8
PIF_VALUE: 3
PIF_VALUE: 2
PIF_VALUE: 3
PIF_VALUE: 9
PIF_VALUE: 3
PIF_VALUE: 8
PIF_VALUE: 3
PIF_VALUE: 2
PIF_VALUE: 2
PIF_VALUE: 6
PIF_VALUE: 3
PIF_VALUE: 9
PIF_VALUE: 41
PIF_VALUE: 2
PIF_VALUE: 19
PIF_VALUE: 2
PIF_VALUE: 9
PIF_VALUE: 3
PIF_VALUE: 0
PIF_VALUE: 26
PIF_VALUE: 1
PIF_VALUE: 9
PIF_VALUE: 1
PIF_VALUE: 2
PIF_VALUE: 2
PIF_VALUE: 3
PIF_VALUE: 8
PIF_VALUE: 9

## 2018-05-30 ASSESSMENT — PAIN DESCRIPTION - ORIENTATION: ORIENTATION: LEFT

## 2018-05-30 ASSESSMENT — PAIN DESCRIPTION - FREQUENCY: FREQUENCY: CONTINUOUS

## 2018-05-30 ASSESSMENT — PAIN DESCRIPTION - DESCRIPTORS: DESCRIPTORS: ACHING

## 2018-05-30 ASSESSMENT — PAIN DESCRIPTION - PAIN TYPE: TYPE: SURGICAL PAIN

## 2018-05-30 ASSESSMENT — PAIN DESCRIPTION - LOCATION: LOCATION: CHEST

## 2018-05-31 ENCOUNTER — APPOINTMENT (OUTPATIENT)
Dept: GENERAL RADIOLOGY | Age: 43
DRG: 171 | End: 2018-05-31
Payer: COMMERCIAL

## 2018-05-31 VITALS
BODY MASS INDEX: 33.51 KG/M2 | TEMPERATURE: 99.3 F | SYSTOLIC BLOOD PRESSURE: 97 MMHG | RESPIRATION RATE: 16 BRPM | WEIGHT: 201.1 LBS | OXYGEN SATURATION: 93 % | DIASTOLIC BLOOD PRESSURE: 65 MMHG | HEART RATE: 59 BPM | HEIGHT: 65 IN

## 2018-05-31 LAB
EKG ATRIAL RATE: 61 BPM
EKG P AXIS: 101 DEGREES
EKG P-R INTERVAL: 152 MS
EKG Q-T INTERVAL: 460 MS
EKG QRS DURATION: 94 MS
EKG QTC CALCULATION (BAZETT): 463 MS
EKG R AXIS: 72 DEGREES
EKG T AXIS: 45 DEGREES
EKG VENTRICULAR RATE: 61 BPM
GLUCOSE BLD-MCNC: 134 MG/DL (ref 70–108)
GLUCOSE BLD-MCNC: 146 MG/DL (ref 70–108)

## 2018-05-31 PROCEDURE — 93005 ELECTROCARDIOGRAM TRACING: CPT | Performed by: INTERNAL MEDICINE

## 2018-05-31 PROCEDURE — 6370000000 HC RX 637 (ALT 250 FOR IP): Performed by: INTERNAL MEDICINE

## 2018-05-31 PROCEDURE — 6370000000 HC RX 637 (ALT 250 FOR IP): Performed by: HOSPITALIST

## 2018-05-31 PROCEDURE — 82948 REAGENT STRIP/BLOOD GLUCOSE: CPT

## 2018-05-31 PROCEDURE — 71046 X-RAY EXAM CHEST 2 VIEWS: CPT

## 2018-05-31 PROCEDURE — 6370000000 HC RX 637 (ALT 250 FOR IP): Performed by: FAMILY MEDICINE

## 2018-05-31 RX ORDER — CYCLOBENZAPRINE HCL 10 MG
5 TABLET ORAL ONCE
Status: COMPLETED | OUTPATIENT
Start: 2018-05-31 | End: 2018-05-31

## 2018-05-31 RX ADMIN — LAMOTRIGINE 200 MG: 100 TABLET ORAL at 09:21

## 2018-05-31 RX ADMIN — ESCITALOPRAM 20 MG: 20 TABLET, FILM COATED ORAL at 09:21

## 2018-05-31 RX ADMIN — ACETAMINOPHEN 650 MG: 325 TABLET ORAL at 05:24

## 2018-05-31 RX ADMIN — SODIUM CHLORIDE TAB 1 GM 1 G: 1 TAB at 09:21

## 2018-05-31 RX ADMIN — CYCLOBENZAPRINE 5 MG: 10 TABLET, FILM COATED ORAL at 06:20

## 2018-05-31 RX ADMIN — LEVOTHYROXINE SODIUM 200 MCG: 100 TABLET ORAL at 05:21

## 2018-05-31 RX ADMIN — ACETAMINOPHEN 650 MG: 325 TABLET ORAL at 13:17

## 2018-05-31 RX ADMIN — LEVOTHYROXINE SODIUM 25 MCG: 25 TABLET ORAL at 05:22

## 2018-05-31 ASSESSMENT — PAIN SCALES - GENERAL
PAINLEVEL_OUTOF10: 0
PAINLEVEL_OUTOF10: 8
PAINLEVEL_OUTOF10: 9

## 2018-06-01 ENCOUNTER — TELEPHONE (OUTPATIENT)
Dept: FAMILY MEDICINE CLINIC | Age: 43
End: 2018-06-01

## 2018-06-01 RX ORDER — CYCLOBENZAPRINE HCL 10 MG
10 TABLET ORAL 3 TIMES DAILY PRN
Qty: 30 TABLET | Refills: 0 | Status: SHIPPED | OUTPATIENT
Start: 2018-06-01 | End: 2018-09-15 | Stop reason: SDUPTHER

## 2018-06-08 ENCOUNTER — NURSE ONLY (OUTPATIENT)
Dept: CARDIOLOGY CLINIC | Age: 43
End: 2018-06-08
Payer: COMMERCIAL

## 2018-06-08 ENCOUNTER — OFFICE VISIT (OUTPATIENT)
Dept: CARDIOLOGY CLINIC | Age: 43
End: 2018-06-08
Payer: COMMERCIAL

## 2018-06-08 VITALS
HEIGHT: 65 IN | DIASTOLIC BLOOD PRESSURE: 70 MMHG | SYSTOLIC BLOOD PRESSURE: 112 MMHG | HEART RATE: 60 BPM | WEIGHT: 190 LBS | BODY MASS INDEX: 31.65 KG/M2

## 2018-06-08 DIAGNOSIS — R00.1 BRADYCARDIA: Primary | ICD-10-CM

## 2018-06-08 DIAGNOSIS — Z95.0 PACEMAKER: ICD-10-CM

## 2018-06-08 DIAGNOSIS — I25.10 ASCVD (ARTERIOSCLEROTIC CARDIOVASCULAR DISEASE): ICD-10-CM

## 2018-06-08 PROCEDURE — G8417 CALC BMI ABV UP PARAM F/U: HCPCS | Performed by: INTERNAL MEDICINE

## 2018-06-08 PROCEDURE — 93280 PM DEVICE PROGR EVAL DUAL: CPT | Performed by: INTERNAL MEDICINE

## 2018-06-08 PROCEDURE — 1111F DSCHRG MED/CURRENT MED MERGE: CPT | Performed by: INTERNAL MEDICINE

## 2018-06-08 PROCEDURE — G8598 ASA/ANTIPLAT THER USED: HCPCS | Performed by: INTERNAL MEDICINE

## 2018-06-08 PROCEDURE — 4004F PT TOBACCO SCREEN RCVD TLK: CPT | Performed by: INTERNAL MEDICINE

## 2018-06-08 PROCEDURE — 99214 OFFICE O/P EST MOD 30 MIN: CPT | Performed by: INTERNAL MEDICINE

## 2018-06-08 PROCEDURE — G8427 DOCREV CUR MEDS BY ELIG CLIN: HCPCS | Performed by: INTERNAL MEDICINE

## 2018-06-08 RX ORDER — ATORVASTATIN CALCIUM 10 MG/1
10 TABLET, FILM COATED ORAL DAILY
Qty: 30 TABLET | Refills: 3 | Status: SHIPPED | OUTPATIENT
Start: 2018-06-08 | End: 2018-10-11 | Stop reason: SDUPTHER

## 2018-06-08 ASSESSMENT — ENCOUNTER SYMPTOMS
EYE DISCHARGE: 0
BACK PAIN: 0
ORTHOPNEA: 0
CONSTIPATION: 0
HEMOPTYSIS: 0
SHORTNESS OF BREATH: 0
BOWEL INCONTINENCE: 0
DIARRHEA: 0
CHANGE IN BOWEL HABIT: 0
EYE PAIN: 0
SPUTUM PRODUCTION: 0
COUGH: 0
BLURRED VISION: 0
ABDOMINAL PAIN: 0
HOARSE VOICE: 0
DOUBLE VISION: 0
BLOATING: 0

## 2018-06-11 ENCOUNTER — TELEPHONE (OUTPATIENT)
Dept: FAMILY MEDICINE CLINIC | Age: 43
End: 2018-06-11

## 2018-06-18 ENCOUNTER — PROCEDURE VISIT (OUTPATIENT)
Dept: CARDIOLOGY CLINIC | Age: 43
End: 2018-06-18

## 2018-06-18 DIAGNOSIS — Z95.0 PACEMAKER: Primary | ICD-10-CM

## 2018-06-21 ENCOUNTER — OFFICE VISIT (OUTPATIENT)
Dept: FAMILY MEDICINE CLINIC | Age: 43
End: 2018-06-21

## 2018-06-21 VITALS
RESPIRATION RATE: 16 BRPM | DIASTOLIC BLOOD PRESSURE: 58 MMHG | BODY MASS INDEX: 31.99 KG/M2 | WEIGHT: 192 LBS | HEART RATE: 56 BPM | HEIGHT: 65 IN | SYSTOLIC BLOOD PRESSURE: 106 MMHG

## 2018-06-21 DIAGNOSIS — R55 SYNCOPE, UNSPECIFIED SYNCOPE TYPE: Primary | ICD-10-CM

## 2018-06-21 PROCEDURE — 99214 OFFICE O/P EST MOD 30 MIN: CPT | Performed by: FAMILY MEDICINE

## 2018-06-21 PROCEDURE — 1111F DSCHRG MED/CURRENT MED MERGE: CPT | Performed by: FAMILY MEDICINE

## 2018-06-21 RX ORDER — ESTRADIOL 1 MG/1
TABLET ORAL
Qty: 30 TABLET | Refills: 0 | Status: SHIPPED | OUTPATIENT
Start: 2018-06-21 | End: 2018-11-12

## 2018-06-21 RX ORDER — CYCLOBENZAPRINE HCL 10 MG
TABLET ORAL
Refills: 0 | COMMUNITY
Start: 2018-06-15 | End: 2018-10-11 | Stop reason: ALTCHOICE

## 2018-06-29 ENCOUNTER — NURSE ONLY (OUTPATIENT)
Dept: CARDIOLOGY CLINIC | Age: 43
End: 2018-06-29

## 2018-06-29 DIAGNOSIS — Z95.0 PACEMAKER: Primary | ICD-10-CM

## 2018-07-03 ENCOUNTER — OFFICE VISIT (OUTPATIENT)
Dept: CARDIOLOGY CLINIC | Age: 43
End: 2018-07-03
Payer: COMMERCIAL

## 2018-07-03 VITALS
SYSTOLIC BLOOD PRESSURE: 135 MMHG | WEIGHT: 190 LBS | HEIGHT: 65 IN | DIASTOLIC BLOOD PRESSURE: 88 MMHG | BODY MASS INDEX: 31.65 KG/M2 | HEART RATE: 60 BPM

## 2018-07-03 DIAGNOSIS — Z98.890 S/P RF ABLATION OPERATION FOR ARRHYTHMIA: ICD-10-CM

## 2018-07-03 DIAGNOSIS — Z95.0 HISTORY OF CARDIAC PACEMAKER: Primary | ICD-10-CM

## 2018-07-03 DIAGNOSIS — E78.5 HYPERLIPIDEMIA, UNSPECIFIED HYPERLIPIDEMIA TYPE: ICD-10-CM

## 2018-07-03 DIAGNOSIS — I10 ESSENTIAL HYPERTENSION: ICD-10-CM

## 2018-07-03 DIAGNOSIS — Z86.79 S/P RF ABLATION OPERATION FOR ARRHYTHMIA: ICD-10-CM

## 2018-07-03 PROCEDURE — G8427 DOCREV CUR MEDS BY ELIG CLIN: HCPCS | Performed by: NURSE PRACTITIONER

## 2018-07-03 PROCEDURE — 99213 OFFICE O/P EST LOW 20 MIN: CPT | Performed by: NURSE PRACTITIONER

## 2018-07-03 PROCEDURE — G8417 CALC BMI ABV UP PARAM F/U: HCPCS | Performed by: NURSE PRACTITIONER

## 2018-07-03 PROCEDURE — G8598 ASA/ANTIPLAT THER USED: HCPCS | Performed by: NURSE PRACTITIONER

## 2018-07-03 PROCEDURE — 4004F PT TOBACCO SCREEN RCVD TLK: CPT | Performed by: NURSE PRACTITIONER

## 2018-07-03 NOTE — PROGRESS NOTES
Pt C/O CP that are burning sensation, headaches, dizziness, some fatigue    Pt states that about 6 days ago she woke up with a blood shot eye, and she has felt weak since. She will also have spells where she gets so hot, and her skin gets beat red, and her clothes are just drenched. She did have pacer checked last week and they adjusted and it feels better since. Pt denies  SOB, heart palpitations, swelling             [facility]  [facility] [address] [address]   Dept: [phone]   Dept Fax: [phone]   Loc: [phone]      Chief Complaint   Patient presents with    Follow-up     dizziness, just does not feel well     Office visit for complaints of dizzynes with hot flashes. States she has been cold and hot. Denies chest pain, palpitations, sob, syncope, or SAYDA. Pacer clinic had no abnormal recordings from 7/1/18 that the patient states she sent in. Cardiologist:  Dr. Jim Tolbert:   No fever, no chills, No fatigue or weight loss  Pulmonary:    No dyspnea, no wheezing  Cardiac:    Denies recent chest pain   GI:     No nausea or vomiting, no abdominal pain  Neuro:    No dizziness or light headedness  Musculoskeletal:  No recent active issues  Extremities:   No edema, good peripheral pulses      Past Medical History:   Diagnosis Date    Anxiety     Anxiety and poor coping skills and recent altercations with her boyfriend's ex-girlfriend.  Arrhythmia     Possible arrhythmia induced shortness of breath.  Arthritis     Atypical chest pain     Bradycardia 3/19/2014    CAD (coronary artery disease)     Cancer (HCC)     Follicular thyroid cancer, S/P total thyroidectomy in 2005, radioactive iodine ablation in 2007.  Chest pain 3/19/2014    CHF (congestive heart failure) (HCC)     COPD (chronic obstructive pulmonary disease) (HCC)     History of chronic obstructive pulmonary disease.      Cystocele, midline     Depression     Diabetes mellitus ([facility] [address]     DJD (degenerative joint disease)     Environmental allergies 5/14/2014    Fatty liver 6/2016    Female stress incontinence     Fibromyalgia 5/14/2014    Herpes 2016    History of alcohol abuse     History of heavy alcohol use, but recently quit.  History of MRSA infection     History of seizure disorder     History of syncope     History of syncopal episode.  Hx of blood clots     lower leg    Hyperlipidemia     Hypotension     Hypothyroidism      History of hypothyroidism but her TSH is 0.04 indicating hyperthyroidism.  MDRO (multiple drug resistant organisms) resistance 2005 2006    Medtronic dual pacer  6/8/2018    medtronic loop recorder 25/8/0891    Metabolic syndrome      Metabolic syndrome     Migraine headache     Mild pulmonary hypertension     Obesity, Class II, BMI 35.0-39.9, with comorbidity (see actual BMI)     PAC (premature atrial contraction)     Palpitations     Personal history of methicillin resistant Staphylococcus aureus     history of MRSA    PVC's      S/P ablation operation for arrhythmia 2003    Ep Study Ablation - [facility] [place] [place].  Scoliosis     Seizures (HCC)     Sinus bradycardia     Sinus bradycardia with infrequent PACs.  Superficial phlebitis     Tachycardia     Type II or unspecified type diabetes mellitus without mention of complication, not stated as uncontrolled     Unstable angina (HCC)     Possible unstable angina.        Allergies   Allergen Reactions    Latex Other (See Comments)     Blisters everywhere    Niaspan [Niacin Er] Anaphylaxis    Tape [Adhesive Tape] Dermatitis     Blisters    Morphine Nausea And Vomiting       Current Outpatient Prescriptions   Medication Sig Dispense Refill    estradiol (ESTRACE) 1 MG tablet 1 PO DAILY 30 tablet 0    cyclobenzaprine (FLEXERIL) 10 MG tablet take 1 tablet by mouth three times a day if needed for muscle spasm  0    atorvastatin (LIPITOR) 10 MG tablet Take 1 tablet by mouth daily 30 tablet 3    lamoTRIgine (LAMICTAL) 200 MG tablet Take 1 tablet by mouth daily. 30 tablet 2    escitalopram (LEXAPRO) 20 MG tablet Take 1 tablet by mouth daily 30 tablet 5    glucose blood VI test strips (TRUE METRIX BLOOD GLUCOSE TEST) strip Use to test blood glucose levels BID. Diagnosis: E11.65 100 each 5    Blood Glucose Monitoring Suppl (TRUE METRIX METER) w/Device KIT Use to test blood glucose levels BID. Diagnosis: E11.65 1 kit 0    potassium chloride (KLOR-CON M) 20 MEQ extended release tablet take 1 tablet by mouth once daily 30 tablet 7    levothyroxine (SYNTHROID) 200 MCG tablet Take 1 tablet by mouth Daily 30 tablet 3    levothyroxine (SYNTHROID) 25 MCG tablet Take 1 tablet by mouth Daily 30 tablet 3    glimepiride (AMARYL) 1 MG tablet Take 1 tablet by mouth every morning 60 tablet 3    valACYclovir (VALTREX) 500 MG tablet Take 1 tablet by mouth daily 30 tablet 11    montelukast (SINGULAIR) 10 MG tablet Take 1 tablet by mouth nightly 30 tablet 11    bumetanide (BUMEX) 1 MG tablet Take 1 mg by mouth daily as needed   0    RA MELATONIN 5 MG TABS tablet take 1 tablet at bedtime if needed for insomnia  0    fluticasone propionate (FLOVENT DISKUS) 100 MCG/BLIST AEPB inhaler Inhale 1 puff into the lungs daily (Patient taking differently: Inhale 1 puff into the lungs daily as needed ) 60 each 0    albuterol sulfate HFA (PROAIR HFA) 108 (90 BASE) MCG/ACT inhaler Inhale 2 puffs into the lungs every 4 hours as needed for Wheezing 1 Inhaler 1    butalbital-acetaminophen-caffeine (FIORICET) -40 MG per tablet Take 1 tablet by mouth every 6 hours as needed for Headaches 30 tablet 0    Lancets MISC Check blood sugar 4 x daily (Dx: E11.65) 150 each 3     No current facility-administered medications for this visit.         Social History     Social History    Marital status:      Spouse name: N/A    Number of children: 4    Years of education: 10     Occupational History    [place]      Social History Main Topics    Smoking status: Current Every Day Smoker     Packs/day: 1.00     Years: 10.00     Types: Cigarettes    Smokeless tobacco: Never Used    Alcohol use No      Comment: Patient states, Uri Becerra was a heavy drinker in the past and quit in 2010. \"     Drug use: Yes     Types: Marijuana    Sexual activity: Yes     Partners: Male     Other Topics Concern    None     Social History Narrative    None       Family History   Problem Relation Age of Onset    COPD Mother     Diabetes Mother     Heart Disease Mother     Colon Cancer Neg Hx        Blood pressure 135/88, pulse 60, height 5' 5\" (1.651 m), weight 190 lb (86.2 kg), last menstrual period 12/19/2014, not currently breastfeeding. General:   Well developed, well nourished  Lungs:   Clear to auscultation  Heart:    Normal S1 S2, No murmur, rubs, or gallops  Abdomen:   Soft, non tender, no organomegalies, positive bowel sounds  Extremities:   No edema, no cyanosis, good peripheral pulses  Neurological:   Awake, alert, oriented. No obvious focal deficits  Musculoskeletal:  No obvious deformity       Diagnosis Orders   1. History of cardiac pacemaker     2. S/P RF ablation operation for arrhythmia     3. Essential hypertension     4. Hyperlipidemia, unspecified hyperlipidemia type       Plan:  Encouraged to follow up with PCP regarding recent change in her estrace dose as complaints appear more as hot flashes and hormonal related.        Discussed use, benefit, and side effects of prescribed medications. All patient questions answered. Pt voiced understanding. Instructed to continue current medications, diet and exercise. Continue risk factor modification and medical management. Patient agreed with treatment plan. Follow up as directed.         Continue Dr Omer Patino current treatment plan  Follow up with Dr Joey Dallas as scheduled or sooner if needed

## 2018-07-11 ENCOUNTER — OFFICE VISIT (OUTPATIENT)
Dept: INTERNAL MEDICINE CLINIC | Age: 43
End: 2018-07-11
Payer: COMMERCIAL

## 2018-07-11 VITALS
WEIGHT: 192 LBS | HEART RATE: 73 BPM | BODY MASS INDEX: 31.99 KG/M2 | HEIGHT: 65 IN | SYSTOLIC BLOOD PRESSURE: 110 MMHG | RESPIRATION RATE: 16 BRPM | DIASTOLIC BLOOD PRESSURE: 72 MMHG

## 2018-07-11 DIAGNOSIS — E11.65 TYPE 2 DIABETES MELLITUS WITH HYPERGLYCEMIA, WITH LONG-TERM CURRENT USE OF INSULIN (HCC): Primary | ICD-10-CM

## 2018-07-11 DIAGNOSIS — Z79.4 TYPE 2 DIABETES MELLITUS WITH HYPERGLYCEMIA, WITH LONG-TERM CURRENT USE OF INSULIN (HCC): Primary | ICD-10-CM

## 2018-07-11 DIAGNOSIS — C73 THYROID CANCER (HCC): ICD-10-CM

## 2018-07-11 DIAGNOSIS — E66.9 OBESITY (BMI 30.0-34.9): ICD-10-CM

## 2018-07-11 DIAGNOSIS — E89.0 POSTOPERATIVE HYPOTHYROIDISM: ICD-10-CM

## 2018-07-11 PROCEDURE — 2022F DILAT RTA XM EVC RTNOPTHY: CPT | Performed by: NURSE PRACTITIONER

## 2018-07-11 PROCEDURE — G8598 ASA/ANTIPLAT THER USED: HCPCS | Performed by: NURSE PRACTITIONER

## 2018-07-11 PROCEDURE — G8417 CALC BMI ABV UP PARAM F/U: HCPCS | Performed by: NURSE PRACTITIONER

## 2018-07-11 PROCEDURE — G8427 DOCREV CUR MEDS BY ELIG CLIN: HCPCS | Performed by: NURSE PRACTITIONER

## 2018-07-11 PROCEDURE — 99214 OFFICE O/P EST MOD 30 MIN: CPT | Performed by: NURSE PRACTITIONER

## 2018-07-11 PROCEDURE — 3046F HEMOGLOBIN A1C LEVEL >9.0%: CPT | Performed by: NURSE PRACTITIONER

## 2018-07-11 PROCEDURE — 4004F PT TOBACCO SCREEN RCVD TLK: CPT | Performed by: NURSE PRACTITIONER

## 2018-07-11 ASSESSMENT — ENCOUNTER SYMPTOMS
PHOTOPHOBIA: 0
NAUSEA: 0
CONSTIPATION: 0
VOICE CHANGE: 0
ABDOMINAL PAIN: 0
VOMITING: 0
SHORTNESS OF BREATH: 0
CHEST TIGHTNESS: 0
TROUBLE SWALLOWING: 0

## 2018-07-11 NOTE — PROGRESS NOTES
Subjective:      Patient ID: Marsha Matamoros is a 37 y.o. female. HPI     Presents for follow up of follicular thyroid carcinoma diagnosed 2005 and type 2 diabetes diagnosed 12 years ago. Last seen by Dr. Valarie Srinivasan 1/9/2018. Status post total thyroidectomy in 2005 and radioactive iodine ablation in 2007. Last thyroid US 8/2017 was stable with no tissue regrowth. Currently maintained on Levothyroxine 225 mcg daily. This dose was changed at her last appointment due to suppressed TSH and elevated free hormone. 5/2018 TSH 1.770. Current symptoms include heat intolerance, excessive fatigue. Denies dysphonia, dysphagia or neck pain. Type 2 diabetes. She is currently taking prescribed Amaryl, she is not taking. She reports this is due to lack of insurance coverage. She is not checking her blood sugars. Last A1c on file was 8.8% 8/2017. Complaints of polydipsia and polyuria. Reports eating candy to help with polydipsia. No vision changes. Eye exam is not current. Past Medical History:   Diagnosis Date    Anxiety     Anxiety and poor coping skills and recent altercations with her boyfriend's ex-girlfriend.  Arrhythmia     Possible arrhythmia induced shortness of breath.  Arthritis     Atypical chest pain     Bradycardia 3/19/2014    CAD (coronary artery disease)     Cancer (HCC)     Follicular thyroid cancer, S/P total thyroidectomy in 2005, radioactive iodine ablation in 2007.  Chest pain 3/19/2014    CHF (congestive heart failure) (HCC)     COPD (chronic obstructive pulmonary disease) (HCC)     History of chronic obstructive pulmonary disease.  Cystocele, midline     Depression     Diabetes mellitus ([address]     DJD (degenerative joint disease)     Environmental allergies 5/14/2014    Fatty liver 6/2016    Female stress incontinence     Fibromyalgia 5/14/2014    Herpes 2016    History of alcohol abuse     History of heavy alcohol use, but recently quit.      History of MRSA infection  History of seizure disorder     History of syncope     History of syncopal episode.  Hx of blood clots     lower leg    Hyperlipidemia     Hypotension     Hypothyroidism      History of hypothyroidism but her TSH is 0.04 indicating hyperthyroidism.  MDRO (multiple drug resistant organisms) resistance 2005 2006    Medtronic dual pacer  6/8/2018    medtronic loop recorder 72/7/7321    Metabolic syndrome      Metabolic syndrome     Migraine headache     Mild pulmonary hypertension     Obesity, Class II, BMI 35.0-39.9, with comorbidity (see actual BMI)     PAC (premature atrial contraction)     Palpitations     Personal history of methicillin resistant Staphylococcus aureus     history of MRSA    PVC's      S/P ablation operation for arrhythmia 2003    Ep Study Ablation - [place], [place].  Scoliosis     Seizures (HCC)     Sinus bradycardia     Sinus bradycardia with infrequent PACs.  Superficial phlebitis     Tachycardia     Type II or unspecified type diabetes mellitus without mention of complication, not stated as uncontrolled     Unstable angina (HCC)     Possible unstable angina. Family History   Problem Relation Age of Onset   [place] COPD Mother     Diabetes Mother     Heart Disease Mother     Colon Cancer Neg Hx      Social History     Social History    Marital status:      Spouse name: N/A    Number of children: 4    Years of education: 8     Occupational History    [place]      Social History Main Topics    Smoking status: Current Every Day Smoker     Packs/day: 1.00     Years: 10.00     Types: Cigarettes    Smokeless tobacco: Never Used    Alcohol use No      Comment: Patient states, Kathleen Marie was a heavy drinker in the past and quit in 2010. \"     Drug use: Yes     Types: Marijuana    Sexual activity: Yes     Partners: Male     Other Topics Concern    Not on file     Social History Narrative    No narrative on file     Outpatient (AMARYL) 1 MG tablet Take 1 tablet by mouth every morning 60 tablet 3     No facility-administered encounter medications on file as of 7/11/2018. Review of Systems   Constitutional: Positive for fatigue. Negative for activity change and appetite change. HENT: Negative for trouble swallowing and voice change. Eyes: Negative for photophobia and visual disturbance. Respiratory: Negative for chest tightness and shortness of breath. Cardiovascular: Negative for chest pain, palpitations and leg swelling. Gastrointestinal: Negative for abdominal pain, constipation, nausea and vomiting. Endocrine: Positive for heat intolerance, polydipsia and polyuria. Negative for cold intolerance. Genitourinary: Negative for frequency and urgency. Musculoskeletal: Negative for gait problem. Skin: Negative for rash. Neurological: Positive for dizziness. Negative for seizures, weakness, numbness and headaches. Psychiatric/Behavioral: Negative for behavioral problems and sleep disturbance. Objective:   Physical Exam   Constitutional: She is oriented to person, place, and time. She appears well-developed and well-nourished. HENT:   Head: Normocephalic and atraumatic. Eyes: Pupils are equal, round, and reactive to light. Neck: Normal range of motion. Neck supple. No tracheal deviation present. No thyromegaly present. Cardiovascular: Normal rate, regular rhythm, normal heart sounds and intact distal pulses. No murmur heard. Pulmonary/Chest: Effort normal and breath sounds normal.   Abdominal: Soft. Bowel sounds are normal.   Musculoskeletal: Normal range of motion. Neurological: She is alert and oriented to person, place, and time. Skin: Skin is warm and dry. Psychiatric: She has a normal mood and affect. Vitals reviewed. Assessment / Plan:      1. Thyroid cancer status post complete thyroidectomy and RAIA. Last thyroid US 8/2017 was stable. Thyroglobulin levels undetected 9/2017. Repeat TSH, FT4, thyroglobulin levels and thyroid US. RTC in 3 months with Dr. Josh Tuttle. 2. Postoperative hypothyroidism secondary to #1.   3. Type 2 diabetes, uncontrolled due to noncompliance. Patient reports insurance company will not pay for meter or Amaryl. Encouraged to call [employer] for recommendations, as True Metrix and Amaryl is usually covered under this plan. Check CMP, Lipids and A1c.   4. Obesity. BMI 31.95. Lifestyle modifications. 5. Bradycardia, status post PM insertion.

## 2018-07-19 DIAGNOSIS — C73 FOLLICULAR THYROID CANCER (HCC): ICD-10-CM

## 2018-07-19 RX ORDER — LEVOTHYROXINE SODIUM 0.2 MG/1
200 TABLET ORAL DAILY
Qty: 30 TABLET | Refills: 3 | Status: SHIPPED | OUTPATIENT
Start: 2018-07-19 | End: 2018-10-11 | Stop reason: SDUPTHER

## 2018-07-19 RX ORDER — LEVOTHYROXINE SODIUM 0.03 MG/1
25 TABLET ORAL DAILY
Qty: 30 TABLET | Refills: 3 | Status: SHIPPED | OUTPATIENT
Start: 2018-07-19 | End: 2018-11-29 | Stop reason: DRUGHIGH

## 2018-09-17 RX ORDER — ESTRADIOL 1 MG/1
TABLET ORAL
Qty: 30 TABLET | Refills: 0 | OUTPATIENT
Start: 2018-09-17

## 2018-09-17 RX ORDER — CYCLOBENZAPRINE HCL 10 MG
TABLET ORAL
Qty: 30 TABLET | Refills: 0 | Status: SHIPPED | OUTPATIENT
Start: 2018-09-17 | End: 2018-10-11 | Stop reason: ALTCHOICE

## 2018-09-17 NOTE — TELEPHONE ENCOUNTER
The pharmacy is requesting a refill of the below medication which has been pended for you:     Requested Prescriptions     Pending Prescriptions Disp Refills    estradiol (ESTRACE) 1 MG tablet [Pharmacy Med Name: ESTRADIOL 1 MG TABLET] 30 tablet 0     Sig: take 1 tablet by mouth once daily    cyclobenzaprine (FLEXERIL) 10 MG tablet [Pharmacy Med Name: CYCLOBENZAPRINE 10 MG TABLET] 30 tablet 0     Sig: take 1 tablet by mouth three times a day if needed for muscle spasm       Last Appointment Date: 6/21/2018  Next Appointment Date: Visit date not found    Allergies   Allergen Reactions    Latex Other (See Comments)     Blisters everywhere    Niaspan [Niacin Er] Anaphylaxis    Tape [Adhesive Tape] Dermatitis     Blisters    Morphine Nausea And Vomiting

## 2018-09-28 ENCOUNTER — NURSE ONLY (OUTPATIENT)
Dept: CARDIOLOGY CLINIC | Age: 43
End: 2018-09-28
Payer: COMMERCIAL

## 2018-09-28 DIAGNOSIS — Z95.0 PACEMAKER: Primary | ICD-10-CM

## 2018-09-28 PROCEDURE — 93288 INTERROG EVL PM/LDLS PM IP: CPT | Performed by: INTERNAL MEDICINE

## 2018-09-28 NOTE — PROGRESS NOTES
[employer] SINGLE PACEMAKER  PROGRAMMED AAI 60  BATTERY 9.5 YRS REMAINING  P WAVES 2.8-4  A PACED 28.3%  P WAVES 2.8-4  ATRIAL THRESHOLD 0.5 @ 0.4    VENT AMPLITUDE 2.5 @ 0.40  SITE LOOKS GREAT   INCISION LINE IS CLOSED

## 2018-10-09 ENCOUNTER — HOSPITAL ENCOUNTER (OUTPATIENT)
Age: 43
Discharge: HOME | End: 2018-10-09
Payer: COMMERCIAL

## 2018-10-09 DIAGNOSIS — E11.65 TYPE 2 DIABETES MELLITUS WITH HYPERGLYCEMIA, WITH LONG-TERM CURRENT USE OF INSULIN (HCC): ICD-10-CM

## 2018-10-09 DIAGNOSIS — I25.10 ASCVD (ARTERIOSCLEROTIC CARDIOVASCULAR DISEASE): ICD-10-CM

## 2018-10-09 DIAGNOSIS — C73 THYROID CANCER (HCC): ICD-10-CM

## 2018-10-09 DIAGNOSIS — E89.0 POSTOPERATIVE HYPOTHYROIDISM: ICD-10-CM

## 2018-10-09 DIAGNOSIS — Z79.4 TYPE 2 DIABETES MELLITUS WITH HYPERGLYCEMIA, WITH LONG-TERM CURRENT USE OF INSULIN (HCC): ICD-10-CM

## 2018-10-09 LAB
ALBUMIN SERPL-MCNC: 4.6 G/DL (ref 3.5–5.1)
ALP BLD-CCNC: 90 U/L (ref 38–126)
ALT SERPL-CCNC: 9 U/L (ref 11–66)
ANION GAP SERPL CALCULATED.3IONS-SCNC: 13 MEQ/L (ref 8–16)
AST SERPL-CCNC: 14 U/L (ref 5–40)
AVERAGE GLUCOSE: 156 MG/DL (ref 70–126)
BILIRUB SERPL-MCNC: 0.6 MG/DL (ref 0.3–1.2)
BILIRUBIN DIRECT: < 0.2 MG/DL (ref 0–0.3)
BUN BLDV-MCNC: 15 MG/DL (ref 7–22)
CALCIUM SERPL-MCNC: 9.9 MG/DL (ref 8.5–10.5)
CHLORIDE BLD-SCNC: 99 MEQ/L (ref 98–111)
CHOLESTEROL, TOTAL: 140 MG/DL (ref 100–199)
CO2: 28 MEQ/L (ref 23–33)
CREAT SERPL-MCNC: 0.7 MG/DL (ref 0.4–1.2)
GFR SERPL CREATININE-BSD FRML MDRD: > 90 ML/MIN/1.73M2
GLUCOSE BLD-MCNC: 122 MG/DL (ref 70–108)
HBA1C MFR BLD: 7.2 % (ref 4.4–6.4)
HDLC SERPL-MCNC: 46 MG/DL
LDL CHOLESTEROL CALCULATED: 69 MG/DL
POTASSIUM SERPL-SCNC: 4.2 MEQ/L (ref 3.5–5.2)
SODIUM BLD-SCNC: 140 MEQ/L (ref 135–145)
T4 FREE: 1.21 NG/DL (ref 0.93–1.76)
TOTAL PROTEIN: 8.2 G/DL (ref 6.1–8)
TRIGL SERPL-MCNC: 123 MG/DL (ref 0–199)
TSH SERPL DL<=0.05 MIU/L-ACNC: 7.79 UIU/ML (ref 0.4–4.2)

## 2018-10-09 PROCEDURE — 80053 COMPREHEN METABOLIC PANEL: CPT

## 2018-10-09 PROCEDURE — 83036 HEMOGLOBIN GLYCOSYLATED A1C: CPT

## 2018-10-09 PROCEDURE — 86800 THYROGLOBULIN ANTIBODY: CPT

## 2018-10-09 PROCEDURE — 84439 ASSAY OF FREE THYROXINE: CPT

## 2018-10-09 PROCEDURE — 84432 ASSAY OF THYROGLOBULIN: CPT

## 2018-10-09 PROCEDURE — 80061 LIPID PANEL: CPT

## 2018-10-09 PROCEDURE — 36415 COLL VENOUS BLD VENIPUNCTURE: CPT

## 2018-10-09 PROCEDURE — 82248 BILIRUBIN DIRECT: CPT

## 2018-10-09 PROCEDURE — 84443 ASSAY THYROID STIM HORMONE: CPT

## 2018-10-11 ENCOUNTER — OFFICE VISIT (OUTPATIENT)
Dept: INTERNAL MEDICINE CLINIC | Age: 43
End: 2018-10-11
Payer: COMMERCIAL

## 2018-10-11 VITALS
BODY MASS INDEX: 32.82 KG/M2 | DIASTOLIC BLOOD PRESSURE: 86 MMHG | WEIGHT: 197 LBS | SYSTOLIC BLOOD PRESSURE: 120 MMHG | HEIGHT: 65 IN | HEART RATE: 56 BPM

## 2018-10-11 DIAGNOSIS — Z95.0 PACEMAKER: ICD-10-CM

## 2018-10-11 DIAGNOSIS — Z92.3 HISTORY OF RADIOACTIVE IODINE THYROID ABLATION: ICD-10-CM

## 2018-10-11 DIAGNOSIS — M79.7 FIBROMYALGIA: ICD-10-CM

## 2018-10-11 DIAGNOSIS — E66.9 OBESITY (BMI 30.0-34.9): ICD-10-CM

## 2018-10-11 DIAGNOSIS — E89.0 S/P THYROIDECTOMY: ICD-10-CM

## 2018-10-11 DIAGNOSIS — C73 FOLLICULAR THYROID CANCER (HCC): ICD-10-CM

## 2018-10-11 DIAGNOSIS — F31.62 BIPOLAR DISORDER, CURRENT EPISODE MIXED, MODERATE (HCC): ICD-10-CM

## 2018-10-11 DIAGNOSIS — I49.5 SICK SINUS SYNDROME (HCC): ICD-10-CM

## 2018-10-11 DIAGNOSIS — E89.0 POSTOPERATIVE HYPOTHYROIDISM: Primary | ICD-10-CM

## 2018-10-11 PROCEDURE — G8598 ASA/ANTIPLAT THER USED: HCPCS | Performed by: INTERNAL MEDICINE

## 2018-10-11 PROCEDURE — 4004F PT TOBACCO SCREEN RCVD TLK: CPT | Performed by: INTERNAL MEDICINE

## 2018-10-11 PROCEDURE — G8417 CALC BMI ABV UP PARAM F/U: HCPCS | Performed by: INTERNAL MEDICINE

## 2018-10-11 PROCEDURE — G8427 DOCREV CUR MEDS BY ELIG CLIN: HCPCS | Performed by: INTERNAL MEDICINE

## 2018-10-11 PROCEDURE — G8484 FLU IMMUNIZE NO ADMIN: HCPCS | Performed by: INTERNAL MEDICINE

## 2018-10-11 PROCEDURE — 99204 OFFICE O/P NEW MOD 45 MIN: CPT | Performed by: INTERNAL MEDICINE

## 2018-10-11 RX ORDER — LEVOTHYROXINE SODIUM 0.05 MG/1
50 TABLET ORAL DAILY
Qty: 30 TABLET | Refills: 3 | Status: SHIPPED | OUTPATIENT
Start: 2018-10-11 | End: 2019-01-28 | Stop reason: ALTCHOICE

## 2018-10-11 RX ORDER — ATORVASTATIN CALCIUM 10 MG/1
10 TABLET, FILM COATED ORAL DAILY
Qty: 30 TABLET | Refills: 5 | Status: SHIPPED | OUTPATIENT
Start: 2018-10-11 | End: 2019-08-02

## 2018-10-11 RX ORDER — LEVOTHYROXINE SODIUM 0.2 MG/1
200 TABLET ORAL DAILY
Qty: 30 TABLET | Refills: 5 | Status: SHIPPED | OUTPATIENT
Start: 2018-10-11 | End: 2019-01-28 | Stop reason: ALTCHOICE

## 2018-10-11 RX ORDER — LEVOTHYROXINE SODIUM 0.03 MG/1
25 TABLET ORAL DAILY
Qty: 30 TABLET | Refills: 5 | Status: CANCELLED | OUTPATIENT
Start: 2018-10-11

## 2018-10-11 NOTE — PROGRESS NOTES
Hypothyroidism      History of hypothyroidism but her TSH is 0.04 indicating hyperthyroidism.  MDRO (multiple drug resistant organisms) resistance 2005 2006    Medtronic dual pacer  6/8/2018    medtronic loop recorder 32/0/3832    Metabolic syndrome      Metabolic syndrome     Migraine headache     Mild pulmonary hypertension (HCC)     Obesity, Class II, BMI 35.0-39.9, with comorbidity (see actual BMI)     PAC (premature atrial contraction)     Palpitations     Personal history of methicillin resistant Staphylococcus aureus     history of MRSA    PVC's      S/P ablation operation for arrhythmia 2003    Ep Study Ablation - [facility], [address].  Scoliosis     Seizures (HCC)     Sinus bradycardia     Sinus bradycardia with infrequent PACs.  Superficial phlebitis     Tachycardia     Type II or unspecified type diabetes mellitus without mention of complication, not stated as uncontrolled     Unstable angina (HCC)     Possible unstable angina. Current Outpatient Prescriptions   Medication Sig Dispense Refill    atorvastatin (LIPITOR) 10 MG tablet Take 1 tablet by mouth daily 30 tablet 5    levothyroxine (SYNTHROID) 200 MCG tablet Take 1 tablet by mouth Daily Along with 25 mcg (total 225 mcg daily) 30 tablet 5    levothyroxine (SYNTHROID) 50 MCG tablet Take 1 tablet by mouth Daily 30 tablet 3    levothyroxine (SYNTHROID) 25 MCG tablet Take 1 tablet by mouth Daily Along with 200 mcg (total 225 mcg daily) 30 tablet 3    estradiol (ESTRACE) 1 MG tablet 1 PO DAILY 30 tablet 0    glucose blood VI test strips (TRUE METRIX BLOOD GLUCOSE TEST) strip Use to test blood glucose levels BID. Diagnosis: E11.65 100 each 5    Blood Glucose Monitoring Suppl (TRUE METRIX METER) w/Device KIT Use to test blood glucose levels BID.  Diagnosis: E11.65 1 kit 0    potassium chloride (KLOR-CON M) 20 MEQ extended release tablet take 1 tablet by mouth once daily (Patient taking differently: hematuria  Musculoskeletal ROS: negative  Neurological ROS: no TIA or stroke symptoms  Dermatological ROS: negative    Blood pressure 120/86, pulse 56, height 5' 5\" (1.651 m), weight 197 lb (89.4 kg), last menstrual period 12/19/2014, not currently breastfeeding. Physical Examination: General appearance - alert, well appearing, and in no distress  HEENT-head normocephalic, atraumatic  Mental status - alert, oriented to person, place, and time  Neck - supple, no significant adenopathy  Chest - clear to auscultation, no wheezes, rales or rhonchi, symmetric air entry  Heart - normal rate, regular rhythm, normal S1, S2, no murmurs, rubs, clicks or gallops  Abdomen - soft, nontender, nondistended, no masses or organomegaly  Neurological - alert, oriented, normal speech, no focal findings or movement disorder noted  Musculoskeletal - no joint tenderness, deformity or swelling  Extremities - peripheral pulses normal, no pedal edema, no clubbing or cyanosis  Skin - normal coloration and turgor, no rashes, no suspicious skin lesions noted        Diagnosis Orders   1. Postoperative hypothyroidism  atorvastatin (LIPITOR) 10 MG tablet    levothyroxine (SYNTHROID) 50 MCG tablet    TSH With Reflex Ft4   2. Follicular thyroid cancer, 2005  levothyroxine (SYNTHROID) 200 MCG tablet    TSH With Reflex Ft4   3. S/P thyroidectomy     4. Bipolar disorder, current episode mixed, moderate ([address]     5. Sick sinus syndrome ([address]     6. Fibromyalgia     7. Pacemaker     8. Obesity (BMI 30.0-34.9)         Orders Placed This Encounter   Procedures    TSH With Reflex Ft4     Standing Status:   Future     Standing Expiration Date:   10/11/2019     Extensive counseling was done regarding diabetes.  The goals are to decrease or prevent the complications of diabetes and improve survival. The following goals were discussed  HgbA1c < 7 (providing no hypoglycemia)    BMI  18-25    BP < 130/80    STATIN(medium or high risk)    DIET <20% protein  <

## 2018-10-12 LAB — THYROGLOBULIN: NORMAL

## 2018-11-09 ENCOUNTER — NURSE TRIAGE (OUTPATIENT)
Dept: ADMINISTRATIVE | Age: 43
End: 2018-11-09

## 2018-11-12 ENCOUNTER — OFFICE VISIT (OUTPATIENT)
Dept: FAMILY MEDICINE CLINIC | Age: 43
End: 2018-11-12

## 2018-11-12 ENCOUNTER — TELEPHONE (OUTPATIENT)
Dept: CARDIOLOGY CLINIC | Age: 43
End: 2018-11-12

## 2018-11-12 VITALS
HEIGHT: 65 IN | WEIGHT: 200.13 LBS | BODY MASS INDEX: 33.34 KG/M2 | SYSTOLIC BLOOD PRESSURE: 110 MMHG | RESPIRATION RATE: 12 BRPM | HEART RATE: 70 BPM | DIASTOLIC BLOOD PRESSURE: 74 MMHG

## 2018-11-12 DIAGNOSIS — R52 PAIN IN PACEMAKER POCKET: Primary | ICD-10-CM

## 2018-11-12 PROCEDURE — 99213 OFFICE O/P EST LOW 20 MIN: CPT | Performed by: FAMILY MEDICINE

## 2018-11-12 RX ORDER — IBUPROFEN 600 MG/1
600 TABLET ORAL EVERY 6 HOURS PRN
COMMUNITY
End: 2019-12-27 | Stop reason: HOSPADM

## 2018-11-12 RX ORDER — ONDANSETRON 4 MG/1
4 TABLET, FILM COATED ORAL EVERY 8 HOURS PRN
COMMUNITY
End: 2018-12-11

## 2018-11-12 ASSESSMENT — ENCOUNTER SYMPTOMS
RHINORRHEA: 0
SORE THROAT: 0
SHORTNESS OF BREATH: 0
COUGH: 1
CONSTIPATION: 1

## 2018-11-12 NOTE — TELEPHONE ENCOUNTER
Dr Garcia's office calling in to schedule this mutual patient for a follow up appointment with someone in the office. She is having pain at her pacemaker site, and she was seen at [facility] ER on 11/9/18 also. Please advise who the patient needs to be seen by, the provider or device clinic and call the patient directly.

## 2018-11-24 ENCOUNTER — HOSPITAL ENCOUNTER (OUTPATIENT)
Age: 43
Discharge: HOME | End: 2018-11-24
Payer: COMMERCIAL

## 2018-11-24 DIAGNOSIS — E89.0 POSTOPERATIVE HYPOTHYROIDISM: ICD-10-CM

## 2018-11-24 DIAGNOSIS — C73 FOLLICULAR THYROID CANCER (HCC): ICD-10-CM

## 2018-11-24 LAB
T4 FREE: 2.55 NG/DL (ref 0.93–1.76)
TSH SERPL DL<=0.05 MIU/L-ACNC: < 0.005 UIU/ML (ref 0.4–4.2)

## 2018-11-24 PROCEDURE — 84439 ASSAY OF FREE THYROXINE: CPT

## 2018-11-24 PROCEDURE — 36415 COLL VENOUS BLD VENIPUNCTURE: CPT

## 2018-11-24 PROCEDURE — 84443 ASSAY THYROID STIM HORMONE: CPT

## 2018-11-28 ENCOUNTER — TELEPHONE (OUTPATIENT)
Dept: INTERNAL MEDICINE CLINIC | Age: 43
End: 2018-11-28

## 2018-11-29 ENCOUNTER — OFFICE VISIT (OUTPATIENT)
Dept: INTERNAL MEDICINE CLINIC | Age: 43
End: 2018-11-29
Payer: COMMERCIAL

## 2018-11-29 VITALS
OXYGEN SATURATION: 98 % | BODY MASS INDEX: 32.99 KG/M2 | DIASTOLIC BLOOD PRESSURE: 84 MMHG | HEART RATE: 76 BPM | HEIGHT: 65 IN | WEIGHT: 198 LBS | SYSTOLIC BLOOD PRESSURE: 136 MMHG

## 2018-11-29 DIAGNOSIS — C73 FOLLICULAR THYROID CANCER (HCC): ICD-10-CM

## 2018-11-29 DIAGNOSIS — M79.7 FIBROMYALGIA: ICD-10-CM

## 2018-11-29 DIAGNOSIS — E89.0 H/O TOTAL THYROIDECTOMY: ICD-10-CM

## 2018-11-29 DIAGNOSIS — E05.90 HYPERTHYROIDISM: ICD-10-CM

## 2018-11-29 DIAGNOSIS — E89.0 HYPOTHYROIDISM, POSTSURGICAL: Primary | ICD-10-CM

## 2018-11-29 DIAGNOSIS — Z92.3 HISTORY OF RADIOACTIVE IODINE THYROID ABLATION: ICD-10-CM

## 2018-11-29 DIAGNOSIS — F31.9 BIPOLAR AFFECTIVE DISORDER, REMISSION STATUS UNSPECIFIED (HCC): ICD-10-CM

## 2018-11-29 DIAGNOSIS — Z95.0 PACEMAKER: ICD-10-CM

## 2018-11-29 PROCEDURE — 4004F PT TOBACCO SCREEN RCVD TLK: CPT | Performed by: INTERNAL MEDICINE

## 2018-11-29 PROCEDURE — G8484 FLU IMMUNIZE NO ADMIN: HCPCS | Performed by: INTERNAL MEDICINE

## 2018-11-29 PROCEDURE — G8417 CALC BMI ABV UP PARAM F/U: HCPCS | Performed by: INTERNAL MEDICINE

## 2018-11-29 PROCEDURE — 99213 OFFICE O/P EST LOW 20 MIN: CPT | Performed by: INTERNAL MEDICINE

## 2018-11-29 PROCEDURE — G8427 DOCREV CUR MEDS BY ELIG CLIN: HCPCS | Performed by: INTERNAL MEDICINE

## 2018-11-29 PROCEDURE — G8598 ASA/ANTIPLAT THER USED: HCPCS | Performed by: INTERNAL MEDICINE

## 2018-11-29 NOTE — PROGRESS NOTES
take 1 tablet by mouth once daily prn) 30 tablet 7    bumetanide (BUMEX) 1 MG tablet Take 1 mg by mouth daily as needed   0    RA MELATONIN 5 MG TABS tablet take 1 tablet at bedtime if needed for insomnia  0    fluticasone propionate (FLOVENT DISKUS) 100 MCG/BLIST AEPB inhaler Inhale 1 puff into the lungs daily (Patient taking differently: Inhale 1 puff into the lungs daily as needed ) 60 each 0    albuterol sulfate HFA (PROAIR HFA) 108 (90 BASE) MCG/ACT inhaler Inhale 2 puffs into the lungs every 4 hours as needed for Wheezing 1 Inhaler 1    Lancets MISC Check blood sugar 4 x daily (Dx: E11.65) 150 each 3     No current facility-administered medications for this visit. Review of Systems -nothing new    Blood pressure 136/84, pulse 76, height 5' 5\" (1.651 m), weight 198 lb (89.8 kg), last menstrual period 12/19/2014, SpO2 98 %, not currently breastfeeding. Physical Examination: Not repeated       Diagnosis Orders   1. Hypothyroidism, postsurgical     2. Hyperthyroidism  TSH with Reflex    medication induced   3. Follicular thyroid cancer, 2005     4. Bipolar affective disorder, remission status unspecified ([address]     5. Medtronic dual pacer      6. Fibromyalgia     7. H/O total thyroidectomy     8. History of radioactive iodine thyroid ablation         Orders Placed This Encounter   Procedures    TSH with Reflex     6 weeks     Extensive counseling was done regarding diabetes.  The goals are to decrease or prevent the complications of diabetes and improve survival. The following goals were discussed  HgbA1c < 7 (providing no hypoglycemia)    BMI  18-25    BP < 130/80    STATIN(medium or high risk)    DIET <20% protein  < 30% fat, no trans fats, calorie restricted if BMI high    URINE MICROALBUMIN/CREATINIE  < 30     DILATED EYE EXAM EVERY 1-2 YEARS    MONITOR FEET FOR SORES, NEUROPATHY, ETC    REGULAR DENTAL CARE    Recent hemoglobin A1c 7.2    Recent TSH was less than 0.005  Free T4 was 2.55  I told

## 2018-12-11 ENCOUNTER — OFFICE VISIT (OUTPATIENT)
Dept: CARDIOLOGY CLINIC | Age: 43
End: 2018-12-11
Payer: COMMERCIAL

## 2018-12-11 ENCOUNTER — TELEPHONE (OUTPATIENT)
Dept: CARDIOLOGY CLINIC | Age: 43
End: 2018-12-11

## 2018-12-11 VITALS
HEIGHT: 65 IN | SYSTOLIC BLOOD PRESSURE: 108 MMHG | BODY MASS INDEX: 32.99 KG/M2 | DIASTOLIC BLOOD PRESSURE: 66 MMHG | WEIGHT: 198 LBS

## 2018-12-11 DIAGNOSIS — Z95.0 PACEMAKER: Primary | ICD-10-CM

## 2018-12-11 DIAGNOSIS — I20.8 STABLE ANGINA PECTORIS (HCC): Primary | ICD-10-CM

## 2018-12-11 PROCEDURE — G8427 DOCREV CUR MEDS BY ELIG CLIN: HCPCS | Performed by: INTERNAL MEDICINE

## 2018-12-11 PROCEDURE — G8417 CALC BMI ABV UP PARAM F/U: HCPCS | Performed by: INTERNAL MEDICINE

## 2018-12-11 PROCEDURE — 99214 OFFICE O/P EST MOD 30 MIN: CPT | Performed by: INTERNAL MEDICINE

## 2018-12-11 PROCEDURE — G8484 FLU IMMUNIZE NO ADMIN: HCPCS | Performed by: INTERNAL MEDICINE

## 2018-12-11 PROCEDURE — G8598 ASA/ANTIPLAT THER USED: HCPCS | Performed by: INTERNAL MEDICINE

## 2018-12-11 PROCEDURE — 4004F PT TOBACCO SCREEN RCVD TLK: CPT | Performed by: INTERNAL MEDICINE

## 2018-12-11 PROCEDURE — 93000 ELECTROCARDIOGRAM COMPLETE: CPT | Performed by: INTERNAL MEDICINE

## 2018-12-11 ASSESSMENT — ENCOUNTER SYMPTOMS
WHEEZING: 0
BACK PAIN: 0
DOUBLE VISION: 0
COUGH: 0
CONSTIPATION: 0
ABDOMINAL PAIN: 0
VOMITING: 0
SHORTNESS OF BREATH: 0
BLURRED VISION: 0
SORE THROAT: 0
LEFT EYE: 0
NAUSEA: 0
RIGHT EYE: 0
DIARRHEA: 0

## 2018-12-11 NOTE — PROGRESS NOTES
CARDIOLOGY - ELECTROPHYSIOLOGY  PROGRESS NOTE    Date:   12/11/2018  Patient name: Donavan Smith  Date of admission:  No admission date for patient encounter. MRN:   [id number]  YOB: 1975  PCP: Enzo Ruiz MD    Subjective:  I am having pain in my side and want to know if it is caused by my pacemaker       Clinical Changes / Abnormalities:    05/29/2018: The patient is a 38 y/o female that has a long history of cardiac arrhythmias dating back 16 year. The patient underwent EPS with RFA at [facility] when she was 33 y/o for SVT. 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 The patient continued to experience palpitations after the ablation and was found to have frequent PACs and also PVCs. The patient had another diagnostic EPS performed at [facility] several years ago. The patient also has a h/o recurrent syncope and has been diagnosed with vasovagal syncope. She had a lLR implanted 4/27/2018. The patient states she started experience worsening dizziness and fatigue two weeks ago. She has also had three episodes of syncope over the past two weeks, including two the day of admission. The patient was seen in the ED and left against medical advice. She went home and had a syncopal event and was admitted to the hospital on 5/26/2018. Her ILR was interrogated. The patient has been noted to have persistent sinus bradycardia. Dr. Joselin Colorado walked the patient in the damon for three minutes and her heart rate increased to only 74 bpm.  The patient is not on any medications that will affect her sinus node function and her TSH is normal.  EP is being consulted to evaluate the patient for possible pacemaker insertion of symptomatic sick sinus syndrome. 12/11/2018: The patient is s/p insertion of a AAIR pacemaker on 5/30/2018 for symptomatic sick sinus syndrome.   The patient reports having intermittent sharp pains at the site of her pacemaker and also along her Superficial phlebitis     Sick sinus syndrome (HCC)     Medtronic dual pacer       Plan of Treatment:   1. Reprogram output of device since capture threshold is 0.25V at 04 ms. 2. Continue remote pacemaker checks in the device clinic. 3. F/U with EP as needed.

## 2018-12-13 ENCOUNTER — PROCEDURE VISIT (OUTPATIENT)
Dept: CARDIOLOGY CLINIC | Age: 43
End: 2018-12-13

## 2018-12-13 DIAGNOSIS — Z95.0 PACEMAKER: Primary | ICD-10-CM

## 2018-12-28 ENCOUNTER — TELEPHONE (OUTPATIENT)
Dept: FAMILY MEDICINE CLINIC | Age: 43
End: 2018-12-28

## 2019-01-15 ENCOUNTER — HOSPITAL ENCOUNTER (OUTPATIENT)
Age: 44
Discharge: HOME | End: 2019-01-15
Payer: COMMERCIAL

## 2019-01-15 LAB
T4 FREE: 2.26 NG/DL (ref 0.93–1.76)
TSH SERPL DL<=0.05 MIU/L-ACNC: < 0.005 UIU/ML (ref 0.4–4.2)

## 2019-01-15 PROCEDURE — 84443 ASSAY THYROID STIM HORMONE: CPT

## 2019-01-15 PROCEDURE — 84439 ASSAY OF FREE THYROXINE: CPT

## 2019-01-15 PROCEDURE — 36415 COLL VENOUS BLD VENIPUNCTURE: CPT

## 2019-01-17 ENCOUNTER — OFFICE VISIT (OUTPATIENT)
Dept: INTERNAL MEDICINE CLINIC | Age: 44
End: 2019-01-17
Payer: COMMERCIAL

## 2019-01-17 VITALS
SYSTOLIC BLOOD PRESSURE: 118 MMHG | HEART RATE: 68 BPM | HEIGHT: 65 IN | WEIGHT: 204 LBS | BODY MASS INDEX: 33.99 KG/M2 | OXYGEN SATURATION: 98 % | DIASTOLIC BLOOD PRESSURE: 80 MMHG

## 2019-01-17 DIAGNOSIS — Z95.0 PACEMAKER: ICD-10-CM

## 2019-01-17 DIAGNOSIS — E89.0 HYPOTHYROIDISM, POSTSURGICAL: ICD-10-CM

## 2019-01-17 DIAGNOSIS — F31.9 BIPOLAR AFFECTIVE DISORDER, REMISSION STATUS UNSPECIFIED (HCC): ICD-10-CM

## 2019-01-17 DIAGNOSIS — E05.90 HYPERTHYROIDISM: Primary | ICD-10-CM

## 2019-01-17 DIAGNOSIS — C73 FOLLICULAR THYROID CANCER (HCC): ICD-10-CM

## 2019-01-17 DIAGNOSIS — E11.9 WELL CONTROLLED TYPE 2 DIABETES MELLITUS (HCC): ICD-10-CM

## 2019-01-17 PROCEDURE — G8427 DOCREV CUR MEDS BY ELIG CLIN: HCPCS | Performed by: INTERNAL MEDICINE

## 2019-01-17 PROCEDURE — 2022F DILAT RTA XM EVC RTNOPTHY: CPT | Performed by: INTERNAL MEDICINE

## 2019-01-17 PROCEDURE — G8484 FLU IMMUNIZE NO ADMIN: HCPCS | Performed by: INTERNAL MEDICINE

## 2019-01-17 PROCEDURE — 4004F PT TOBACCO SCREEN RCVD TLK: CPT | Performed by: INTERNAL MEDICINE

## 2019-01-17 PROCEDURE — G8599 NO ASA/ANTIPLAT THER USE RNG: HCPCS | Performed by: INTERNAL MEDICINE

## 2019-01-17 PROCEDURE — 99213 OFFICE O/P EST LOW 20 MIN: CPT | Performed by: INTERNAL MEDICINE

## 2019-01-17 PROCEDURE — 3046F HEMOGLOBIN A1C LEVEL >9.0%: CPT | Performed by: INTERNAL MEDICINE

## 2019-01-17 PROCEDURE — G8417 CALC BMI ABV UP PARAM F/U: HCPCS | Performed by: INTERNAL MEDICINE

## 2019-01-17 RX ORDER — LEVOTHYROXINE SODIUM 0.15 MG/1
150 TABLET ORAL DAILY
Qty: 30 TABLET | Refills: 3 | Status: SHIPPED | OUTPATIENT
Start: 2019-01-17 | End: 2019-05-30 | Stop reason: SDUPTHER

## 2019-01-22 ENCOUNTER — HOSPITAL ENCOUNTER (EMERGENCY)
Age: 44
Discharge: HOME | End: 2019-01-22
Payer: COMMERCIAL

## 2019-01-22 ENCOUNTER — NURSE TRIAGE (OUTPATIENT)
Dept: ADMINISTRATIVE | Age: 44
End: 2019-01-22

## 2019-01-22 ENCOUNTER — APPOINTMENT (OUTPATIENT)
Dept: CT IMAGING | Age: 44
End: 2019-01-22
Payer: COMMERCIAL

## 2019-01-22 ENCOUNTER — TELEPHONE (OUTPATIENT)
Dept: FAMILY MEDICINE CLINIC | Age: 44
End: 2019-01-22

## 2019-01-22 VITALS
DIASTOLIC BLOOD PRESSURE: 75 MMHG | HEIGHT: 65 IN | HEART RATE: 72 BPM | TEMPERATURE: 98.4 F | WEIGHT: 210 LBS | BODY MASS INDEX: 34.99 KG/M2 | RESPIRATION RATE: 16 BRPM | OXYGEN SATURATION: 97 % | SYSTOLIC BLOOD PRESSURE: 116 MMHG

## 2019-01-22 DIAGNOSIS — S39.012A BACK STRAIN, INITIAL ENCOUNTER: ICD-10-CM

## 2019-01-22 DIAGNOSIS — R55 SYNCOPE AND COLLAPSE: Primary | ICD-10-CM

## 2019-01-22 LAB
ALBUMIN SERPL-MCNC: 4.4 G/DL (ref 3.5–5.1)
ALP BLD-CCNC: 94 U/L (ref 38–126)
ALT SERPL-CCNC: 16 U/L (ref 11–66)
AMORPHOUS: ABNORMAL
AMPHETAMINE+METHAMPHETAMINE URINE SCREEN: NEGATIVE
ANION GAP SERPL CALCULATED.3IONS-SCNC: 13 MEQ/L (ref 8–16)
APTT: 33.6 SECONDS (ref 22–38)
AST SERPL-CCNC: 21 U/L (ref 5–40)
BACTERIA: ABNORMAL /HPF
BARBITURATE QUANTITATIVE URINE: NEGATIVE
BASOPHILS # BLD: 0.4 %
BASOPHILS ABSOLUTE: 0.1 THOU/MM3 (ref 0–0.1)
BENZODIAZEPINE QUANTITATIVE URINE: NEGATIVE
BILIRUB SERPL-MCNC: 0.4 MG/DL (ref 0.3–1.2)
BILIRUBIN URINE: NEGATIVE
BLOOD, URINE: NEGATIVE
BUN BLDV-MCNC: 11 MG/DL (ref 7–22)
CALCIUM SERPL-MCNC: 9.8 MG/DL (ref 8.5–10.5)
CANNABINOID QUANTITATIVE URINE: POSITIVE
CASTS 2: ABNORMAL /LPF
CASTS UA: ABNORMAL /LPF
CHARACTER, URINE: ABNORMAL
CHLORIDE BLD-SCNC: 98 MEQ/L (ref 98–111)
CO2: 28 MEQ/L (ref 23–33)
COCAINE METABOLITE QUANTITATIVE URINE: NEGATIVE
COLOR: YELLOW
CREAT SERPL-MCNC: 0.5 MG/DL (ref 0.4–1.2)
CRYSTALS, UA: ABNORMAL
EKG ATRIAL RATE: 71 BPM
EKG P AXIS: 40 DEGREES
EKG P-R INTERVAL: 134 MS
EKG Q-T INTERVAL: 408 MS
EKG QRS DURATION: 92 MS
EKG QTC CALCULATION (BAZETT): 443 MS
EKG R AXIS: 43 DEGREES
EKG T AXIS: 44 DEGREES
EKG VENTRICULAR RATE: 71 BPM
EOSINOPHIL # BLD: 0.3 %
EOSINOPHILS ABSOLUTE: 0.1 THOU/MM3 (ref 0–0.4)
EPITHELIAL CELLS, UA: ABNORMAL /HPF
ERYTHROCYTE [DISTWIDTH] IN BLOOD BY AUTOMATED COUNT: 12 % (ref 11.5–14.5)
ERYTHROCYTE [DISTWIDTH] IN BLOOD BY AUTOMATED COUNT: 39 FL (ref 35–45)
GFR SERPL CREATININE-BSD FRML MDRD: > 90 ML/MIN/1.73M2
GLUCOSE BLD-MCNC: 237 MG/DL (ref 70–108)
GLUCOSE URINE: NEGATIVE MG/DL
HCT VFR BLD CALC: 45.3 % (ref 37–47)
HEMOGLOBIN: 15.3 GM/DL (ref 12–16)
IMMATURE GRANS (ABS): 0.13 THOU/MM3 (ref 0–0.07)
IMMATURE GRANULOCYTES: 0.7 %
INR BLD: 1.07 (ref 0.85–1.13)
KETONES, URINE: ABNORMAL
LEUKOCYTE ESTERASE, URINE: NEGATIVE
LIPASE: 18.7 U/L (ref 5.6–51.3)
LYMPHOCYTES # BLD: 15.1 %
LYMPHOCYTES ABSOLUTE: 2.8 THOU/MM3 (ref 1–4.8)
MCH RBC QN AUTO: 30.3 PG (ref 26–33)
MCHC RBC AUTO-ENTMCNC: 33.8 GM/DL (ref 32.2–35.5)
MCV RBC AUTO: 89.7 FL (ref 81–99)
MISCELLANEOUS 2: ABNORMAL
MONOCYTES # BLD: 3.4 %
MONOCYTES ABSOLUTE: 0.6 THOU/MM3 (ref 0.4–1.3)
MUCUS: ABNORMAL
NITRITE, URINE: NEGATIVE
NUCLEATED RED BLOOD CELLS: 0 /100 WBC
OPIATES, URINE: NEGATIVE
OSMOLALITY CALCULATION: 284.6 MOSMOL/KG (ref 275–300)
OXYCODONE: NEGATIVE
PH UA: 7
PHENCYCLIDINE QUANTITATIVE URINE: NEGATIVE
PLATELET # BLD: 283 THOU/MM3 (ref 130–400)
PMV BLD AUTO: 9.8 FL (ref 9.4–12.4)
POTASSIUM SERPL-SCNC: 4.3 MEQ/L (ref 3.5–5.2)
PREGNANCY, SERUM: NEGATIVE
PROTEIN UA: NEGATIVE
RBC # BLD: 5.05 MILL/MM3 (ref 4.2–5.4)
RBC URINE: ABNORMAL /HPF
RENAL EPITHELIAL, UA: ABNORMAL
SEG NEUTROPHILS: 80.1 %
SEGMENTED NEUTROPHILS ABSOLUTE COUNT: 14.7 THOU/MM3 (ref 1.8–7.7)
SODIUM BLD-SCNC: 139 MEQ/L (ref 135–145)
SPECIFIC GRAVITY, URINE: 1.02 (ref 1–1.03)
TOTAL PROTEIN: 7.9 G/DL (ref 6.1–8)
TROPONIN T: < 0.01 NG/ML
UROBILINOGEN, URINE: 1 EU/DL
WBC # BLD: 18.4 THOU/MM3 (ref 4.8–10.8)
WBC UA: ABNORMAL /HPF
YEAST: ABNORMAL

## 2019-01-22 PROCEDURE — 85025 COMPLETE CBC W/AUTO DIFF WBC: CPT

## 2019-01-22 PROCEDURE — 72192 CT PELVIS W/O DYE: CPT

## 2019-01-22 PROCEDURE — 70450 CT HEAD/BRAIN W/O DYE: CPT

## 2019-01-22 PROCEDURE — 84484 ASSAY OF TROPONIN QUANT: CPT

## 2019-01-22 PROCEDURE — 6370000000 HC RX 637 (ALT 250 FOR IP): Performed by: STUDENT IN AN ORGANIZED HEALTH CARE EDUCATION/TRAINING PROGRAM

## 2019-01-22 PROCEDURE — 83690 ASSAY OF LIPASE: CPT

## 2019-01-22 PROCEDURE — 93005 ELECTROCARDIOGRAM TRACING: CPT | Performed by: PHYSICIAN ASSISTANT

## 2019-01-22 PROCEDURE — 81001 URINALYSIS AUTO W/SCOPE: CPT

## 2019-01-22 PROCEDURE — 85730 THROMBOPLASTIN TIME PARTIAL: CPT

## 2019-01-22 PROCEDURE — 80053 COMPREHEN METABOLIC PANEL: CPT

## 2019-01-22 PROCEDURE — 36415 COLL VENOUS BLD VENIPUNCTURE: CPT

## 2019-01-22 PROCEDURE — 84703 CHORIONIC GONADOTROPIN ASSAY: CPT

## 2019-01-22 PROCEDURE — 85610 PROTHROMBIN TIME: CPT

## 2019-01-22 PROCEDURE — 80307 DRUG TEST PRSMV CHEM ANLYZR: CPT

## 2019-01-22 PROCEDURE — 72131 CT LUMBAR SPINE W/O DYE: CPT

## 2019-01-22 PROCEDURE — 6360000002 HC RX W HCPCS: Performed by: STUDENT IN AN ORGANIZED HEALTH CARE EDUCATION/TRAINING PROGRAM

## 2019-01-22 PROCEDURE — 72125 CT NECK SPINE W/O DYE: CPT

## 2019-01-22 PROCEDURE — 99284 EMERGENCY DEPT VISIT MOD MDM: CPT

## 2019-01-22 RX ORDER — TRAMADOL HYDROCHLORIDE 50 MG/1
50 TABLET ORAL ONCE
Status: COMPLETED | OUTPATIENT
Start: 2019-01-22 | End: 2019-01-22

## 2019-01-22 RX ORDER — ONDANSETRON 4 MG/1
4 TABLET, ORALLY DISINTEGRATING ORAL ONCE
Status: COMPLETED | OUTPATIENT
Start: 2019-01-22 | End: 2019-01-22

## 2019-01-22 RX ORDER — ACETAMINOPHEN 500 MG
500 TABLET ORAL 4 TIMES DAILY PRN
Qty: 120 TABLET | Refills: 0 | Status: SHIPPED | OUTPATIENT
Start: 2019-01-22

## 2019-01-22 RX ORDER — CYCLOBENZAPRINE HCL 5 MG
5 TABLET ORAL 2 TIMES DAILY PRN
Qty: 30 TABLET | Refills: 0 | Status: SHIPPED | OUTPATIENT
Start: 2019-01-22 | End: 2019-02-01

## 2019-01-22 RX ORDER — CYCLOBENZAPRINE HCL 10 MG
10 TABLET ORAL ONCE
Status: COMPLETED | OUTPATIENT
Start: 2019-01-22 | End: 2019-01-22

## 2019-01-22 RX ADMIN — ONDANSETRON 4 MG: 4 TABLET, ORALLY DISINTEGRATING ORAL at 14:36

## 2019-01-22 RX ADMIN — TRAMADOL HYDROCHLORIDE 50 MG: 50 TABLET, FILM COATED ORAL at 14:32

## 2019-01-22 RX ADMIN — CYCLOBENZAPRINE HYDROCHLORIDE 10 MG: 10 TABLET, FILM COATED ORAL at 14:32

## 2019-01-22 ASSESSMENT — PAIN DESCRIPTION - FREQUENCY: FREQUENCY: CONTINUOUS

## 2019-01-22 ASSESSMENT — PAIN DESCRIPTION - PAIN TYPE: TYPE: ACUTE PAIN

## 2019-01-22 ASSESSMENT — ENCOUNTER SYMPTOMS
SORE THROAT: 0
ABDOMINAL PAIN: 0
DIARRHEA: 0
SHORTNESS OF BREATH: 0
EYE PAIN: 0
WHEEZING: 0
VOMITING: 1
RHINORRHEA: 0
NAUSEA: 0
COUGH: 0
BACK PAIN: 1
EYE DISCHARGE: 0

## 2019-01-22 ASSESSMENT — PAIN SCALES - GENERAL
PAINLEVEL_OUTOF10: 10

## 2019-01-22 ASSESSMENT — PAIN DESCRIPTION - ORIENTATION: ORIENTATION: RIGHT;LEFT

## 2019-01-22 ASSESSMENT — PAIN DESCRIPTION - LOCATION: LOCATION: BACK;BUTTOCKS

## 2019-01-23 PROCEDURE — 93010 ELECTROCARDIOGRAM REPORT: CPT | Performed by: INTERNAL MEDICINE

## 2019-01-28 ENCOUNTER — OFFICE VISIT (OUTPATIENT)
Dept: FAMILY MEDICINE CLINIC | Age: 44
End: 2019-01-28

## 2019-01-28 VITALS
HEIGHT: 65 IN | HEART RATE: 78 BPM | RESPIRATION RATE: 12 BRPM | WEIGHT: 211.25 LBS | DIASTOLIC BLOOD PRESSURE: 60 MMHG | BODY MASS INDEX: 35.2 KG/M2 | SYSTOLIC BLOOD PRESSURE: 92 MMHG

## 2019-01-28 DIAGNOSIS — S30.0XXA CONTUSION OF SACRUM, INITIAL ENCOUNTER: Primary | ICD-10-CM

## 2019-01-28 PROCEDURE — G8427 DOCREV CUR MEDS BY ELIG CLIN: HCPCS | Performed by: FAMILY MEDICINE

## 2019-01-28 PROCEDURE — 99213 OFFICE O/P EST LOW 20 MIN: CPT | Performed by: FAMILY MEDICINE

## 2019-01-28 PROCEDURE — G8417 CALC BMI ABV UP PARAM F/U: HCPCS | Performed by: FAMILY MEDICINE

## 2019-01-28 RX ORDER — KETOROLAC TROMETHAMINE 10 MG/1
10 TABLET, FILM COATED ORAL EVERY 6 HOURS PRN
Qty: 20 TABLET | Refills: 0 | Status: SHIPPED | OUTPATIENT
Start: 2019-01-28 | End: 2019-08-02

## 2019-01-28 ASSESSMENT — ENCOUNTER SYMPTOMS
SINUS PRESSURE: 0
CONSTIPATION: 1
SHORTNESS OF BREATH: 1

## 2019-01-31 ENCOUNTER — HOSPITAL ENCOUNTER (OUTPATIENT)
Dept: NUCLEAR MEDICINE | Age: 44
Discharge: HOME | End: 2019-01-31
Payer: COMMERCIAL

## 2019-01-31 ENCOUNTER — TELEPHONE (OUTPATIENT)
Dept: FAMILY MEDICINE CLINIC | Age: 44
End: 2019-01-31

## 2019-01-31 DIAGNOSIS — S30.0XXA CONTUSION OF SACRUM, INITIAL ENCOUNTER: Primary | ICD-10-CM

## 2019-01-31 DIAGNOSIS — S30.0XXA CONTUSION OF SACRUM, INITIAL ENCOUNTER: ICD-10-CM

## 2019-01-31 DIAGNOSIS — M79.7 FIBROMYALGIA: ICD-10-CM

## 2019-01-31 PROCEDURE — 78306 BONE IMAGING WHOLE BODY: CPT

## 2019-01-31 PROCEDURE — A9503 TC99M MEDRONATE: HCPCS | Performed by: FAMILY MEDICINE

## 2019-01-31 PROCEDURE — 3430000000 HC RX DIAGNOSTIC RADIOPHARMACEUTICAL: Performed by: FAMILY MEDICINE

## 2019-01-31 RX ORDER — TC 99M MEDRONATE 20 MG/10ML
25 INJECTION, POWDER, LYOPHILIZED, FOR SOLUTION INTRAVENOUS
Status: COMPLETED | OUTPATIENT
Start: 2019-01-31 | End: 2019-01-31

## 2019-01-31 RX ORDER — PREGABALIN 50 MG/1
50 CAPSULE ORAL 3 TIMES DAILY
Qty: 21 CAPSULE | Refills: 0 | Status: SHIPPED | OUTPATIENT
Start: 2019-01-31 | End: 2019-08-02

## 2019-01-31 RX ADMIN — TC 99M MEDRONATE 25 MILLICURIE: 20 INJECTION, POWDER, LYOPHILIZED, FOR SOLUTION INTRAVENOUS at 11:52

## 2019-02-01 ENCOUNTER — PROCEDURE VISIT (OUTPATIENT)
Dept: CARDIOLOGY CLINIC | Age: 44
End: 2019-02-01
Payer: COMMERCIAL

## 2019-02-01 ENCOUNTER — OFFICE VISIT (OUTPATIENT)
Dept: CARDIOLOGY CLINIC | Age: 44
End: 2019-02-01
Payer: COMMERCIAL

## 2019-02-01 VITALS
DIASTOLIC BLOOD PRESSURE: 80 MMHG | WEIGHT: 212 LBS | SYSTOLIC BLOOD PRESSURE: 128 MMHG | BODY MASS INDEX: 35.32 KG/M2 | HEIGHT: 65 IN | HEART RATE: 64 BPM

## 2019-02-01 DIAGNOSIS — R55 SYNCOPE, UNSPECIFIED SYNCOPE TYPE: Primary | ICD-10-CM

## 2019-02-01 DIAGNOSIS — Z95.0 PACEMAKER: Primary | ICD-10-CM

## 2019-02-01 PROCEDURE — G8484 FLU IMMUNIZE NO ADMIN: HCPCS | Performed by: INTERNAL MEDICINE

## 2019-02-01 PROCEDURE — 99213 OFFICE O/P EST LOW 20 MIN: CPT | Performed by: INTERNAL MEDICINE

## 2019-02-01 PROCEDURE — 93268 ECG RECORD/REVIEW: CPT | Performed by: INTERNAL MEDICINE

## 2019-02-01 PROCEDURE — 4004F PT TOBACCO SCREEN RCVD TLK: CPT | Performed by: INTERNAL MEDICINE

## 2019-02-01 PROCEDURE — G8417 CALC BMI ABV UP PARAM F/U: HCPCS | Performed by: INTERNAL MEDICINE

## 2019-02-01 PROCEDURE — G8427 DOCREV CUR MEDS BY ELIG CLIN: HCPCS | Performed by: INTERNAL MEDICINE

## 2019-02-01 PROCEDURE — G8599 NO ASA/ANTIPLAT THER USE RNG: HCPCS | Performed by: INTERNAL MEDICINE

## 2019-02-04 ENCOUNTER — OFFICE VISIT (OUTPATIENT)
Dept: FAMILY MEDICINE CLINIC | Age: 44
End: 2019-02-04

## 2019-02-04 VITALS
HEIGHT: 65 IN | SYSTOLIC BLOOD PRESSURE: 130 MMHG | WEIGHT: 213.38 LBS | DIASTOLIC BLOOD PRESSURE: 60 MMHG | RESPIRATION RATE: 14 BRPM | HEART RATE: 60 BPM | BODY MASS INDEX: 35.55 KG/M2

## 2019-02-04 DIAGNOSIS — S32.10XA CLOSED FRACTURE OF SACRUM, UNSPECIFIED PORTION OF SACRUM, INITIAL ENCOUNTER (HCC): Primary | ICD-10-CM

## 2019-02-04 PROCEDURE — 99213 OFFICE O/P EST LOW 20 MIN: CPT | Performed by: FAMILY MEDICINE

## 2019-02-04 PROCEDURE — G8417 CALC BMI ABV UP PARAM F/U: HCPCS | Performed by: FAMILY MEDICINE

## 2019-02-04 PROCEDURE — G8427 DOCREV CUR MEDS BY ELIG CLIN: HCPCS | Performed by: FAMILY MEDICINE

## 2019-02-04 RX ORDER — HYDROCODONE BITARTRATE AND ACETAMINOPHEN 5; 325 MG/1; MG/1
1-2 TABLET ORAL EVERY 6 HOURS PRN
Qty: 16 TABLET | Refills: 0 | Status: SHIPPED | OUTPATIENT
Start: 2019-02-04 | End: 2019-02-06

## 2019-02-04 ASSESSMENT — ENCOUNTER SYMPTOMS
SINUS PRESSURE: 0
SHORTNESS OF BREATH: 0
ABDOMINAL PAIN: 1
CONSTIPATION: 0
BACK PAIN: 1

## 2019-02-27 ENCOUNTER — HOSPITAL ENCOUNTER (OUTPATIENT)
Age: 44
Discharge: HOME | End: 2019-02-27
Payer: COMMERCIAL

## 2019-02-27 DIAGNOSIS — C73 FOLLICULAR THYROID CANCER (HCC): ICD-10-CM

## 2019-02-27 DIAGNOSIS — E11.9 WELL CONTROLLED TYPE 2 DIABETES MELLITUS (HCC): ICD-10-CM

## 2019-02-27 LAB
AVERAGE GLUCOSE: 207 MG/DL (ref 70–126)
HBA1C MFR BLD: 8.9 % (ref 4.4–6.4)
T4 FREE: 1.55 NG/DL (ref 0.93–1.76)
TSH SERPL DL<=0.05 MIU/L-ACNC: 0.09 UIU/ML (ref 0.4–4.2)

## 2019-02-27 PROCEDURE — 84443 ASSAY THYROID STIM HORMONE: CPT

## 2019-02-27 PROCEDURE — 36415 COLL VENOUS BLD VENIPUNCTURE: CPT

## 2019-02-27 PROCEDURE — 84439 ASSAY OF FREE THYROXINE: CPT

## 2019-02-27 PROCEDURE — 83036 HEMOGLOBIN GLYCOSYLATED A1C: CPT

## 2019-02-28 ENCOUNTER — OFFICE VISIT (OUTPATIENT)
Dept: INTERNAL MEDICINE CLINIC | Age: 44
End: 2019-02-28
Payer: COMMERCIAL

## 2019-02-28 VITALS
OXYGEN SATURATION: 97 % | SYSTOLIC BLOOD PRESSURE: 100 MMHG | DIASTOLIC BLOOD PRESSURE: 80 MMHG | HEART RATE: 80 BPM | WEIGHT: 213 LBS | HEIGHT: 65 IN | BODY MASS INDEX: 35.49 KG/M2

## 2019-02-28 DIAGNOSIS — M79.7 FIBROMYALGIA: ICD-10-CM

## 2019-02-28 DIAGNOSIS — E11.65 UNCONTROLLED TYPE 2 DIABETES MELLITUS WITH HYPERGLYCEMIA (HCC): ICD-10-CM

## 2019-02-28 DIAGNOSIS — I49.5 SICK SINUS SYNDROME (HCC): ICD-10-CM

## 2019-02-28 DIAGNOSIS — F31.9 BIPOLAR AFFECTIVE DISORDER, REMISSION STATUS UNSPECIFIED (HCC): ICD-10-CM

## 2019-02-28 DIAGNOSIS — C73 FOLLICULAR THYROID CANCER (HCC): ICD-10-CM

## 2019-02-28 DIAGNOSIS — E89.0 HYPOTHYROIDISM, POSTSURGICAL: Primary | ICD-10-CM

## 2019-02-28 PROCEDURE — 4004F PT TOBACCO SCREEN RCVD TLK: CPT | Performed by: INTERNAL MEDICINE

## 2019-02-28 PROCEDURE — G8484 FLU IMMUNIZE NO ADMIN: HCPCS | Performed by: INTERNAL MEDICINE

## 2019-02-28 PROCEDURE — G8427 DOCREV CUR MEDS BY ELIG CLIN: HCPCS | Performed by: INTERNAL MEDICINE

## 2019-02-28 PROCEDURE — G8599 NO ASA/ANTIPLAT THER USE RNG: HCPCS | Performed by: INTERNAL MEDICINE

## 2019-02-28 PROCEDURE — 99213 OFFICE O/P EST LOW 20 MIN: CPT | Performed by: INTERNAL MEDICINE

## 2019-02-28 PROCEDURE — G8417 CALC BMI ABV UP PARAM F/U: HCPCS | Performed by: INTERNAL MEDICINE

## 2019-02-28 PROCEDURE — 3045F PR MOST RECENT HEMOGLOBIN A1C LEVEL 7.0-9.0%: CPT | Performed by: INTERNAL MEDICINE

## 2019-02-28 PROCEDURE — 2022F DILAT RTA XM EVC RTNOPTHY: CPT | Performed by: INTERNAL MEDICINE

## 2019-03-25 ENCOUNTER — PROCEDURE VISIT (OUTPATIENT)
Dept: CARDIOLOGY CLINIC | Age: 44
End: 2019-03-25
Payer: COMMERCIAL

## 2019-03-25 DIAGNOSIS — Z95.0 PACEMAKER: Primary | ICD-10-CM

## 2019-03-25 PROCEDURE — 93294 REM INTERROG EVL PM/LDLS PM: CPT | Performed by: INTERNAL MEDICINE

## 2019-03-25 PROCEDURE — 93296 REM INTERROG EVL PM/IDS: CPT | Performed by: INTERNAL MEDICINE

## 2019-03-26 ENCOUNTER — OFFICE VISIT (OUTPATIENT)
Dept: FAMILY MEDICINE CLINIC | Age: 44
End: 2019-03-26

## 2019-03-26 VITALS
HEIGHT: 65 IN | TEMPERATURE: 98 F | BODY MASS INDEX: 34.99 KG/M2 | SYSTOLIC BLOOD PRESSURE: 124 MMHG | HEART RATE: 80 BPM | WEIGHT: 210 LBS | RESPIRATION RATE: 14 BRPM | DIASTOLIC BLOOD PRESSURE: 64 MMHG

## 2019-03-26 DIAGNOSIS — H65.01 RIGHT ACUTE SEROUS OTITIS MEDIA, RECURRENCE NOT SPECIFIED: ICD-10-CM

## 2019-03-26 DIAGNOSIS — J06.9 UPPER RESPIRATORY TRACT INFECTION, UNSPECIFIED TYPE: Primary | ICD-10-CM

## 2019-03-26 DIAGNOSIS — F17.200 SMOKER: ICD-10-CM

## 2019-03-26 DIAGNOSIS — N76.0 VAGINITIS AND VULVOVAGINITIS: ICD-10-CM

## 2019-03-26 PROCEDURE — G8417 CALC BMI ABV UP PARAM F/U: HCPCS | Performed by: EMERGENCY MEDICINE

## 2019-03-26 PROCEDURE — G8427 DOCREV CUR MEDS BY ELIG CLIN: HCPCS | Performed by: EMERGENCY MEDICINE

## 2019-03-26 PROCEDURE — 99213 OFFICE O/P EST LOW 20 MIN: CPT | Performed by: EMERGENCY MEDICINE

## 2019-03-26 RX ORDER — AZITHROMYCIN 250 MG/1
TABLET, FILM COATED ORAL
Qty: 1 PACKET | Refills: 0 | Status: SHIPPED | OUTPATIENT
Start: 2019-03-26 | End: 2019-04-05

## 2019-03-26 RX ORDER — HYDROCODONE BITARTRATE AND ACETAMINOPHEN 5; 325 MG/1; MG/1
TABLET ORAL
Refills: 0 | COMMUNITY
Start: 2019-03-13 | End: 2019-08-02

## 2019-03-26 RX ORDER — FLUCONAZOLE 150 MG/1
TABLET ORAL
Qty: 2 TABLET | Refills: 0 | Status: SHIPPED | OUTPATIENT
Start: 2019-03-26 | End: 2019-10-31

## 2019-03-26 ASSESSMENT — ENCOUNTER SYMPTOMS
RHINORRHEA: 1
CONSTIPATION: 0
NAUSEA: 0
TROUBLE SWALLOWING: 0
SINUS PRESSURE: 1
SHORTNESS OF BREATH: 0
COUGH: 1
BACK PAIN: 0
VOICE CHANGE: 0
WHEEZING: 0
SORE THROAT: 1
ABDOMINAL PAIN: 0
VOMITING: 0
CHEST TIGHTNESS: 0
DIARRHEA: 0

## 2019-04-05 RX ORDER — SODIUM CHLORIDE 450 MG/100ML
INJECTION, SOLUTION INTRAVENOUS CONTINUOUS
Status: CANCELLED | OUTPATIENT
Start: 2019-04-05

## 2019-04-30 ENCOUNTER — OFFICE VISIT (OUTPATIENT)
Dept: FAMILY MEDICINE CLINIC | Age: 44
End: 2019-04-30

## 2019-04-30 VITALS
DIASTOLIC BLOOD PRESSURE: 60 MMHG | SYSTOLIC BLOOD PRESSURE: 100 MMHG | BODY MASS INDEX: 35.01 KG/M2 | WEIGHT: 210.13 LBS | HEIGHT: 65 IN | RESPIRATION RATE: 14 BRPM | HEART RATE: 76 BPM

## 2019-04-30 DIAGNOSIS — R42 DIZZINESS AFTER EXTENSION OF NECK: Primary | ICD-10-CM

## 2019-04-30 DIAGNOSIS — J30.1 NON-SEASONAL ALLERGIC RHINITIS DUE TO POLLEN: ICD-10-CM

## 2019-04-30 PROCEDURE — 99213 OFFICE O/P EST LOW 20 MIN: CPT | Performed by: FAMILY MEDICINE

## 2019-04-30 PROCEDURE — G8417 CALC BMI ABV UP PARAM F/U: HCPCS | Performed by: FAMILY MEDICINE

## 2019-04-30 RX ORDER — FLUTICASONE PROPIONATE 50 MCG
2 SPRAY, SUSPENSION (ML) NASAL DAILY
Qty: 3 BOTTLE | Refills: 1 | Status: SHIPPED | OUTPATIENT
Start: 2019-04-30 | End: 2020-02-14

## 2019-04-30 RX ORDER — TRAMADOL HYDROCHLORIDE 50 MG/1
TABLET ORAL
Refills: 0 | COMMUNITY
Start: 2019-03-26 | End: 2019-08-02

## 2019-04-30 RX ORDER — MECLIZINE HYDROCHLORIDE 25 MG/1
TABLET ORAL
Qty: 60 TABLET | Refills: 1 | Status: SHIPPED | OUTPATIENT
Start: 2019-04-30 | End: 2022-10-07

## 2019-04-30 ASSESSMENT — ENCOUNTER SYMPTOMS
EYE PAIN: 0
ABDOMINAL PAIN: 0
SHORTNESS OF BREATH: 0
CONSTIPATION: 0
NAUSEA: 0
WHEEZING: 0
COUGH: 0
SORE THROAT: 0
CHEST TIGHTNESS: 0

## 2019-04-30 NOTE — PROGRESS NOTES
Subjective:      Patient ID: Darling Mackey is a 40 y.o. female. Recent   With  Near  falls and right   Ear pain and   Ear  Ringing       Walking  And  Off balance  and  Posterior  Head  Position     With  dizziness    Otalgia    There is pain in both ears. Associated symptoms include headaches. Pertinent negatives include no abdominal pain, coughing, neck pain, rash or sore throat. Associated symptoms comments:   Ringing  In  Ears . Headache    Associated symptoms include dizziness, ear pain and tinnitus. Pertinent negatives include no abdominal pain, coughing, eye pain, fever, nausea, neck pain, numbness, sore throat or weakness. Past Medical History:   Diagnosis Date    Anxiety     Anxiety and poor coping skills and recent altercations with her boyfriend's ex-girlfriend.  Arrhythmia     Possible arrhythmia induced shortness of breath.  Arthritis     Atypical chest pain     Bradycardia 3/19/2014    CAD (coronary artery disease)     Cancer (HCC)     Follicular thyroid cancer, S/P total thyroidectomy in 2005, radioactive iodine ablation in 2007.  Chest pain 3/19/2014    CHF (congestive heart failure) (HCC)     COPD (chronic obstructive pulmonary disease) (HCC)     History of chronic obstructive pulmonary disease.  Cystocele, midline     Depression     Diabetes mellitus ([address]     DJD (degenerative joint disease)     Environmental allergies 5/14/2014    Fatty liver 6/2016    Female stress incontinence     Fibromyalgia 5/14/2014    Herpes 2016    History of alcohol abuse     History of heavy alcohol use, but recently quit.  History of MRSA infection     History of seizure disorder     History of syncope     History of syncopal episode.  Hx of blood clots     lower leg    Hyperlipidemia     Hypotension     Hypothyroidism      History of hypothyroidism but her TSH is 0.04 indicating hyperthyroidism.      MDRO (multiple drug resistant organisms) resistance 2005 2006    Medtronic dual pacer  6/8/2018    medtronic loop recorder 12/4/2012    Medtronic single pacer /AAI paced  7/6/4518    Metabolic syndrome      Metabolic syndrome     Migraine headache     Mild pulmonary hypertension (HCC)     Obesity, Class II, BMI 35.0-39.9, with comorbidity (see actual BMI)     PAC (premature atrial contraction)     Palpitations     Personal history of methicillin resistant Staphylococcus aureus     history of MRSA    PVC's      S/P ablation operation for arrhythmia 2003    Ep Study Ablation - [place], [place].  Scoliosis     Seizures (HCC)     Sinus bradycardia     Sinus bradycardia with infrequent PACs.  Superficial phlebitis     Tachycardia     Type II or unspecified type diabetes mellitus without mention of complication, not stated as uncontrolled     Unstable angina (HCC)     Possible unstable angina. Review of Systems   Constitutional: Negative for appetite change, fatigue and fever. HENT: Positive for ear pain and tinnitus. Negative for congestion, postnasal drip and sore throat. Eyes: Negative for pain and visual disturbance. Respiratory: Negative for cough, chest tightness, shortness of breath and wheezing. Cardiovascular: Negative for chest pain, palpitations and leg swelling. Gastrointestinal: Negative for abdominal pain, constipation and nausea. Genitourinary: Negative for dysuria and frequency. Musculoskeletal: Negative for arthralgias, joint swelling, neck pain and neck stiffness. Skin: Negative for rash. Neurological: Positive for dizziness and headaches. Negative for weakness and numbness. Hematological: Negative for adenopathy. Does not bruise/bleed easily. Psychiatric/Behavioral: Negative for behavioral problems and sleep disturbance. The patient is not nervous/anxious.       /60 (Site: Right Upper Arm, Position: Sitting, Cuff Size: Large Adult)   Pulse 76   Resp 14   Ht 5' 5\" (1.651 m)   Wt 210 lb 2 oz (95.3 kg)   LMP 12/19/2014   BMI 34.97 kg/m²   Objective:   Physical Exam   Constitutional: She is oriented to person, place, and time. She appears well-developed and well-nourished. HENT:   Head: Normocephalic and atraumatic. Right Ear: External ear normal.   Left Ear: External ear normal.   Nose: Nose normal.   Mouth/Throat: Oropharynx is clear and moist.     Congestion  Noted    Eyes: Pupils are equal, round, and reactive to light. Conjunctivae and EOM are normal. No scleral icterus. Neck: Normal range of motion. Neck supple. No JVD present. No thyromegaly present. Cardiovascular: Normal rate, regular rhythm, normal heart sounds and intact distal pulses. Pulmonary/Chest: Effort normal and breath sounds normal. She has no wheezes. She has no rales. Abdominal: Soft. Bowel sounds are normal. She exhibits no distension and no mass. There is no tenderness. Musculoskeletal: Normal range of motion. She exhibits no tenderness. Lymphadenopathy:     She has no cervical adenopathy. Neurological: She is alert and oriented to person, place, and time. She has normal reflexes. No cranial nerve deficit. Skin: Skin is warm and dry. No rash noted. Psychiatric: She has a normal mood and affect. Nursing note and vitals reviewed. Assessment:       Diagnosis Orders   1. Dizziness after extension of neck  meclizine (ANTIVERT) 25 MG tablet    fluticasone (FLONASE) 50 MCG/ACT nasal spray   2.  Non-seasonal allergic rhinitis due to pollen           Plan:      Current Outpatient Medications   Medication Sig Dispense Refill    traMADol (ULTRAM) 50 MG tablet take 1 tablet by mouth every 6 hours if needed for pain  0    meclizine (ANTIVERT) 25 MG tablet Use  In am and  At night  And  Every  6 hours  As needed   dizziness 60 tablet 1    fluticasone (FLONASE) 50 MCG/ACT nasal spray 2 sprays by Nasal route daily 3 Bottle 1    HYDROcodone-acetaminophen (NORCO) 5-325 MG per tablet take 1 to 2 tablets every 6 to 8 hours if needed for pain  - MUST LAST 2 WEEKS  0    fluconazole (DIFLUCAN) 150 MG tablet 1 tablet after all the antibiotic is done, may repeat it in one week 2 tablet 0    ketorolac (TORADOL) 10 MG tablet Take 1 tablet by mouth every 6 hours as needed for Pain 20 tablet 0    acetaminophen (TYLENOL) 500 MG tablet Take 1 tablet by mouth 4 times daily as needed for Pain 120 tablet 0    levothyroxine (SYNTHROID) 150 MCG tablet Take 1 tablet by mouth daily 30 tablet 3    ibuprofen (ADVIL;MOTRIN) 600 MG tablet Take 600 mg by mouth every 6 hours as needed for Pain      atorvastatin (LIPITOR) 10 MG tablet Take 1 tablet by mouth daily 30 tablet 5    glucose blood VI test strips (TRUE METRIX BLOOD GLUCOSE TEST) strip Use to test blood glucose levels BID. Diagnosis: E11.65 100 each 5    Blood Glucose Monitoring Suppl (TRUE METRIX METER) w/Device KIT Use to test blood glucose levels BID. Diagnosis: E11.65 1 kit 0    potassium chloride (KLOR-CON M) 20 MEQ extended release tablet take 1 tablet by mouth once daily (Patient taking differently: take 1 tablet by mouth once daily prn) 30 tablet 7    bumetanide (BUMEX) 1 MG tablet Take 1 mg by mouth daily as needed   0    RA MELATONIN 5 MG TABS tablet take 1 tablet at bedtime if needed for insomnia  0    fluticasone propionate (FLOVENT DISKUS) 100 MCG/BLIST AEPB inhaler Inhale 1 puff into the lungs daily (Patient taking differently: Inhale 1 puff into the lungs daily as needed ) 60 each 0    albuterol sulfate HFA (PROAIR HFA) 108 (90 BASE) MCG/ACT inhaler Inhale 2 puffs into the lungs every 4 hours as needed for Wheezing 1 Inhaler 1    Lancets MISC Check blood sugar 4 x daily (Dx: E11.65) 150 each 3    pregabalin (LYRICA) 50 MG capsule Take 1 capsule by mouth 3 times daily for 7 days. . 21 capsule 0     No current facility-administered medications for this visit. Use  meds as  Positional and   See  Dr Tanesha Horton  One  Month   ?   Post  menopausal symptoms    Nini Melvin MD

## 2019-05-01 RX ORDER — SODIUM CHLORIDE 450 MG/100ML
INJECTION, SOLUTION INTRAVENOUS CONTINUOUS
Status: CANCELLED | OUTPATIENT
Start: 2019-05-01

## 2019-05-24 PROBLEM — K80.20 CHOLELITHIASIS: Status: ACTIVE | Noted: 2019-05-01

## 2019-05-30 ENCOUNTER — OFFICE VISIT (OUTPATIENT)
Dept: INTERNAL MEDICINE CLINIC | Age: 44
End: 2019-05-30
Payer: COMMERCIAL

## 2019-05-30 VITALS
HEIGHT: 65 IN | BODY MASS INDEX: 35.65 KG/M2 | HEART RATE: 64 BPM | OXYGEN SATURATION: 98 % | DIASTOLIC BLOOD PRESSURE: 86 MMHG | WEIGHT: 214 LBS | SYSTOLIC BLOOD PRESSURE: 130 MMHG

## 2019-05-30 DIAGNOSIS — E11.65 UNCONTROLLED TYPE 2 DIABETES MELLITUS WITH HYPERGLYCEMIA (HCC): Primary | ICD-10-CM

## 2019-05-30 DIAGNOSIS — C73 FOLLICULAR THYROID CANCER (HCC): ICD-10-CM

## 2019-05-30 LAB
CHP ED QC CHECK: NORMAL
GLUCOSE BLD-MCNC: 158 MG/DL
HBA1C MFR BLD: 8.2 % (ref 4.3–5.7)

## 2019-05-30 PROCEDURE — G8417 CALC BMI ABV UP PARAM F/U: HCPCS | Performed by: NURSE PRACTITIONER

## 2019-05-30 PROCEDURE — 3045F PR MOST RECENT HEMOGLOBIN A1C LEVEL 7.0-9.0%: CPT | Performed by: NURSE PRACTITIONER

## 2019-05-30 PROCEDURE — 2022F DILAT RTA XM EVC RTNOPTHY: CPT | Performed by: NURSE PRACTITIONER

## 2019-05-30 PROCEDURE — 82962 GLUCOSE BLOOD TEST: CPT | Performed by: NURSE PRACTITIONER

## 2019-05-30 PROCEDURE — 4004F PT TOBACCO SCREEN RCVD TLK: CPT | Performed by: NURSE PRACTITIONER

## 2019-05-30 PROCEDURE — 83036 HEMOGLOBIN GLYCOSYLATED A1C: CPT | Performed by: NURSE PRACTITIONER

## 2019-05-30 PROCEDURE — G8427 DOCREV CUR MEDS BY ELIG CLIN: HCPCS | Performed by: NURSE PRACTITIONER

## 2019-05-30 PROCEDURE — 99214 OFFICE O/P EST MOD 30 MIN: CPT | Performed by: NURSE PRACTITIONER

## 2019-05-30 PROCEDURE — G8599 NO ASA/ANTIPLAT THER USE RNG: HCPCS | Performed by: NURSE PRACTITIONER

## 2019-05-30 RX ORDER — CALCIUM CITRATE/VITAMIN D3 200MG-6.25
1 TABLET ORAL DAILY
Qty: 100 EACH | Refills: 3 | Status: SHIPPED | OUTPATIENT
Start: 2019-05-30 | End: 2021-12-20

## 2019-05-30 RX ORDER — LANCETS 30 GAUGE
1 EACH MISCELLANEOUS DAILY
Qty: 100 EACH | Refills: 5 | Status: SHIPPED | OUTPATIENT
Start: 2019-05-30 | End: 2019-08-02

## 2019-05-30 RX ORDER — BLOOD-GLUCOSE METER
EACH MISCELLANEOUS
Qty: 1 DEVICE | Refills: 0 | Status: SHIPPED | OUTPATIENT
Start: 2019-05-30 | End: 2019-11-25 | Stop reason: ALTCHOICE

## 2019-05-30 RX ORDER — LEVOTHYROXINE SODIUM 0.15 MG/1
150 TABLET ORAL DAILY
Qty: 30 TABLET | Refills: 5 | Status: SHIPPED | OUTPATIENT
Start: 2019-05-30

## 2019-05-30 NOTE — PROGRESS NOTES
[facility] INTERNAL MEDICINE  [address] [address]  [address]  [address] [address]  Dept: [phone]  Dept Fax: [phone] : [phone]     Visit Date:  5/30/2019    Patient:  Domi Anguiano  YOB: 1975    HPI:     Chief Complaint   Patient presents with    Diabetes    Hypothyroidism     Diabetes-  Hope states her diabetes is not well controlled. A1C 8.2% today in office, was 8.9% in 2/2017. She is on Jardiance 10 mg daily. Was on metformin previously but it gave her diarrhea, was also on Janumet but states she lost too much weight with that. Last urinary microalbumin/crt ratio unknown, has been ordered multiple times but not obtained. States hypoglycemic events not present. She does voice understanding of hypoglycemic signs and symptoms. Reports checking glucose occasionally, her insurance will not cover strips. She is not attempting dietary modification for diabetes management and/or weight loss. Has not followed with diabetes clinic, dietician, CDE. Declines at this time. Does report difficulty with obtaining medications. Last eye exam within 1 year reportedly. Does report vision changes, since last visit with them. Agrees to make an appointment soon. BP reccs <140/90 (<130/80 in CVD risk). On atorvastatin 10 mg daily. Denies polyuria, polydipsia, polyphagia. Denies neuropathic symptoms including numbness, tingling. Does not have any wounds to feet. Hypothyroidism  Total thyroidectomy in 2005 by Dr. Juan R Jaimes for thyroid cancer. She complains of fatigue, cold/heat intolerance, and weight gain. Denies hoarseness of voice, fullness in throat, dysphagia, dysphonia, sandiness of eyes, or hair loss. Energy levels are stable. She is taking her Synthroid 150 mcg with water at least 30 minutes prior to eating and 4 hours from any multivitamin/supplements. Last TSH was 0.085 in 2/2019. Last thyroid US was in 8/2017.  Has been without medications for 2-3 weeks. Thyroid ultrasound 8/2017  mpression   Prior thyroidectomy without evidence of residual or recurrent malignancy. Medications    Current Outpatient Medications:     empagliflozin (JARDIANCE) 10 MG tablet, Take 10 mg by mouth daily, Disp: , Rfl:     traMADol (ULTRAM) 50 MG tablet, take 1 tablet by mouth every 6 hours if needed for pain, Disp: , Rfl: 0    meclizine (ANTIVERT) 25 MG tablet, Use  In am and  At night  And  Every  6 hours  As needed   dizziness, Disp: 60 tablet, Rfl: 1    fluticasone (FLONASE) 50 MCG/ACT nasal spray, 2 sprays by Nasal route daily, Disp: 3 Bottle, Rfl: 1    HYDROcodone-acetaminophen (NORCO) 5-325 MG per tablet, take 1 to 2 tablets every 6 to 8 hours if needed for pain  - MUST LAST 2 WEEKS, Disp: , Rfl: 0    fluconazole (DIFLUCAN) 150 MG tablet, 1 tablet after all the antibiotic is done, may repeat it in one week, Disp: 2 tablet, Rfl: 0    ketorolac (TORADOL) 10 MG tablet, Take 1 tablet by mouth every 6 hours as needed for Pain, Disp: 20 tablet, Rfl: 0    acetaminophen (TYLENOL) 500 MG tablet, Take 1 tablet by mouth 4 times daily as needed for Pain, Disp: 120 tablet, Rfl: 0    levothyroxine (SYNTHROID) 150 MCG tablet, Take 1 tablet by mouth daily, Disp: 30 tablet, Rfl: 3    ibuprofen (ADVIL;MOTRIN) 600 MG tablet, Take 600 mg by mouth every 6 hours as needed for Pain, Disp: , Rfl:     atorvastatin (LIPITOR) 10 MG tablet, Take 1 tablet by mouth daily, Disp: 30 tablet, Rfl: 5    glucose blood VI test strips (TRUE METRIX BLOOD GLUCOSE TEST) strip, Use to test blood glucose levels BID. Diagnosis: E11.65, Disp: 100 each, Rfl: 5    Blood Glucose Monitoring Suppl (TRUE METRIX METER) w/Device KIT, Use to test blood glucose levels BID.  Diagnosis: E11.65, Disp: 1 kit, Rfl: 0    potassium chloride (KLOR-CON M) 20 MEQ extended release tablet, take 1 tablet by mouth once daily (Patient taking differently: take 1 tablet by mouth once daily prn), Disp: 30 tablet, Rfl: 7    bumetanide (BUMEX) 1 MG tablet, Take 1 mg by mouth daily as needed , Disp: , Rfl: 0    RA MELATONIN 5 MG TABS tablet, take 1 tablet at bedtime if needed for insomnia, Disp: , Rfl: 0    fluticasone propionate (FLOVENT DISKUS) 100 MCG/BLIST AEPB inhaler, Inhale 1 puff into the lungs daily (Patient taking differently: Inhale 1 puff into the lungs daily as needed ), Disp: 60 each, Rfl: 0    albuterol sulfate HFA (PROAIR HFA) 108 (90 BASE) MCG/ACT inhaler, Inhale 2 puffs into the lungs every 4 hours as needed for Wheezing, Disp: 1 Inhaler, Rfl: 1    Lancets MISC, Check blood sugar 4 x daily (Dx: E11.65), Disp: 150 each, Rfl: 3    pregabalin (LYRICA) 50 MG capsule, Take 1 capsule by mouth 3 times daily for 7 days. ., Disp: 21 capsule, Rfl: 0    The patient is allergic to latex; niaspan [niacin er]; tape [adhesive tape]; and morphine.     Past Medical History  Hope  has a past medical history of Anxiety, Arrhythmia, Arthritis, Atypical chest pain, Bradycardia, CAD (coronary artery disease), Cancer ([address] Chest pain, CHF (congestive heart failure) ([address] Cholelithiasis, COPD (chronic obstructive pulmonary disease) ([address] Cystocele, midline, Depression, Diabetes mellitus ([address] DJD (degenerative joint disease), Environmental allergies, Fatty liver, Female stress incontinence, Fibromyalgia, Herpes, History of alcohol abuse, History of MRSA infection, History of seizure disorder, History of syncope, Hx of blood clots, Hyperlipidemia, Hypotension, Hypothyroidism, MDRO (multiple drug resistant organisms) resistance, Medtronic dual pacer , medtronic loop recorder, Medtronic single pacer /AAI paced , Metabolic syndrome, Metabolic syndrome, Migraine headache, Mild pulmonary hypertension ([address] Obesity, Class II, BMI 35.0-39.9, with comorbidity (see actual BMI), PAC (premature atrial contraction), Palpitations, Personal history of methicillin resistant Staphylococcus aureus, PVC's, S/P ablation operation for arrhythmia, Scoliosis, Seizures ([facility] Sinus bradycardia, Superficial phlebitis, Tachycardia, Type II or unspecified type diabetes mellitus without mention of complication, not stated as uncontrolled, and Unstable angina ([facility] [address] Past Surgical History  The patient  has a past surgical history that includes Thyroidectomy (2005); Tubal ligation (1999); Dilation and curettage of uterus (1995); cyst removal; cardiovascular stress test (3 30 2011); transthoracic echocardiogram (3 30 2011); cardiovascular stress test (9 15 2003); ablation of dysrhythmic focus (2010); laparoscopy (01/02/2013); bladder repair; Cardiac catheterization; Cardiac surgery (5 -8 -12); back surgery (2010); Abdomen surgery; Hysterectomy, total abdominal (1-12-16); Atrial ablation surgery (); other surgical history (04/27/2017); and Pacemaker insertion (05/30/2018). Family History  This patient's family history includes COPD in her mother; Diabetes in her mother; Heart Disease in her mother. Social History  Hope  reports that she has been smoking cigarettes. She has a 10.00 pack-year smoking history. She has never used smokeless tobacco. She reports that she has current or past drug history. Drug: Marijuana. She reports that she does not drink alcohol.     Health Maintenance:    Health Maintenance   Topic Date Due    Pneumococcal 0-64 years Vaccine (1 of 1 - PPSV23) 04/30/1981    Hepatitis B Vaccine (1 of 3 - Risk 3-dose series) 04/30/1994    DTaP/Tdap/Td vaccine (1 - Tdap) 04/30/1994    Diabetic retinal exam  03/16/2017    Breast cancer screen  08/10/2018    Diabetic microalbuminuria test  08/23/2018    Diabetic foot exam  01/09/2019    Flu vaccine (Season Ended) 09/01/2019    Lipid screen  10/09/2019    A1C test (Diabetic or Prediabetic)  02/27/2020    TSH testing  02/27/2020    Potassium monitoring  05/14/2020    Creatinine monitoring  05/14/2020    HIV screen  Completed       Subjective:      Review of Systems   Constitutional: Positive for fatigue and unexpected weight change. Negative for chills and fever. HENT: Negative for congestion, sinus pressure, sinus pain, sore throat and tinnitus. Respiratory: Negative for cough, choking, chest tightness, shortness of breath and wheezing. Cardiovascular: Negative for chest pain, palpitations and leg swelling. Gastrointestinal: Negative for abdominal distention, abdominal pain, blood in stool, nausea and vomiting. Endocrine: Positive for cold intolerance and heat intolerance. Genitourinary: Negative for difficulty urinating, dysuria and hematuria. Skin: Negative for color change, pallor, rash and wound. Neurological: Negative for dizziness, light-headedness and headaches. Psychiatric/Behavioral: Negative for sleep disturbance. The patient is not nervous/anxious. Objective:     /86 (Site: Right Upper Arm, Position: Sitting, Cuff Size: Large Adult)   Pulse 64   Ht 5' 5\" (1.651 m)   Wt 214 lb (97.1 kg)   LMP 12/19/2014   SpO2 98%   BMI 35.61 kg/m²     Physical Exam   Constitutional: She is oriented to person, place, and time. She appears well-developed and well-nourished. No distress. HENT:   Head: Normocephalic and atraumatic. Right Ear: External ear normal.   Left Ear: External ear normal.   Nose: Nose normal.   Mouth/Throat: Oropharynx is clear and moist.   Eyes: Pupils are equal, round, and reactive to light. Conjunctivae and EOM are normal.   Neck: Normal range of motion. Neck supple. No JVD present. No thyromegaly present. Cardiovascular: Normal rate, regular rhythm and normal heart sounds. Exam reveals no gallop and no friction rub. No murmur heard. Pulmonary/Chest: Effort normal and breath sounds normal. No respiratory distress. She has no wheezes. She has no rales. Abdominal: Soft. Bowel sounds are normal. She exhibits no distension. There is no tenderness. Musculoskeletal: Normal range of motion. She exhibits no edema.    Lymphadenopathy: She has no cervical adenopathy. Neurological: She is alert and oriented to person, place, and time. Skin: Skin is warm and dry. Capillary refill takes less than 2 seconds. Psychiatric: She has a normal mood and affect. Her behavior is normal. Judgment and thought content normal.       Labs Reviewed 5/30/2019:    Lab Results   Component Value Date    WBC 10.4 05/14/2019    HGB 14.1 05/14/2019    HCT 41.8 05/14/2019     05/14/2019    CHOL 140 10/09/2018    TRIG 123 10/09/2018    HDL 46 10/09/2018    ALT 14 05/14/2019    AST 16 05/14/2019     05/14/2019    K 3.9 05/14/2019     05/14/2019    CREATININE 0.67 05/14/2019    BUN 12 05/14/2019    CO2 30 05/14/2019    TSH 0.085 (L) 02/27/2019    INR 1.07 01/22/2019    LABA1C 8.2 (H) 05/30/2019    LABMICR 4.77 08/23/2017       Assessment/Plan      1. Uncontrolled type 2 diabetes mellitus with hyperglycemia (HCC)    - POCT glycosylated hemoglobin (Hb A1C)  - 59[address] - Collection Capillary Blood Specimen  - Increase Jardiance to 25 mg daily. Samples given to patient. - Check blood sugars at least once a day and record    2. Follicular thyroid cancer, 2005    - levothyroxine (SYNTHROID) 150 MCG tablet; Take 1 tablet by mouth daily  Dispense: 30 tablet; Refill: 5  - TSH with reflex T4 now  - Thyroid ultrasound     Follow up in 6 weeks, sooner if needed     No follow-ups on file. Patient given educational materials - see patient instructions. Discussed use, benefit, and side effects of prescribed medications. All patient questions answered. Pt voiced understanding. Reviewed health maintenance.        Electronically signed DARIANA Abad - CNP on 5/30/19 at 3:07 PM

## 2019-05-31 ENCOUNTER — OFFICE VISIT (OUTPATIENT)
Dept: FAMILY MEDICINE CLINIC | Age: 44
End: 2019-05-31

## 2019-05-31 VITALS
HEART RATE: 88 BPM | HEIGHT: 65 IN | BODY MASS INDEX: 35.22 KG/M2 | WEIGHT: 211.38 LBS | RESPIRATION RATE: 16 BRPM | SYSTOLIC BLOOD PRESSURE: 100 MMHG | DIASTOLIC BLOOD PRESSURE: 62 MMHG

## 2019-05-31 DIAGNOSIS — J32.9 CHRONIC SINUSITIS, UNSPECIFIED LOCATION: Primary | ICD-10-CM

## 2019-05-31 DIAGNOSIS — Z91.09 ENVIRONMENTAL ALLERGIES: ICD-10-CM

## 2019-05-31 PROCEDURE — G8427 DOCREV CUR MEDS BY ELIG CLIN: HCPCS | Performed by: FAMILY MEDICINE

## 2019-05-31 PROCEDURE — 99213 OFFICE O/P EST LOW 20 MIN: CPT | Performed by: FAMILY MEDICINE

## 2019-05-31 PROCEDURE — G8417 CALC BMI ABV UP PARAM F/U: HCPCS | Performed by: FAMILY MEDICINE

## 2019-05-31 ASSESSMENT — ENCOUNTER SYMPTOMS
SINUS PRESSURE: 1
SINUS PAIN: 1
SHORTNESS OF BREATH: 1
CONSTIPATION: 0

## 2019-05-31 NOTE — PATIENT INSTRUCTIONS
We will try to have her see an allergist  If we can't arrange that then perhaps the ENT at [facility]  See me as needed

## 2019-05-31 NOTE — PROGRESS NOTES
Subjective:      Patient ID: Leia Owens is a 40 y.o. female. HPI  1. The are dizzy episodes, ringing in the ears and congestion  Review of Systems   Constitutional: Negative for chills and fever. HENT: Positive for congestion, sinus pressure, sinus pain and tinnitus. Eyes: Negative for visual disturbance. Respiratory: Positive for shortness of breath. Cardiovascular: Negative for chest pain. Gastrointestinal: Negative for constipation. Genitourinary: Negative. Musculoskeletal: Negative for arthralgias. Skin: Negative for rash. Neurological: Positive for dizziness and headaches (right sided). The patient's medications, allergies, past medical problems, surgical, social, and family histories were reviewed and updated as needed. Objective:   Physical Exam   Constitutional: She is oriented to person, place, and time. She appears well-developed and well-nourished. No distress. HENT:   Head: Normocephalic and atraumatic. Eyes: Conjunctivae are normal. No scleral icterus. Neck: No tracheal deviation present. Cardiovascular: Normal rate, regular rhythm and normal heart sounds. Pulmonary/Chest: Effort normal and breath sounds normal.   Musculoskeletal: She exhibits no edema. Neurological: She is alert and oriented to person, place, and time. Skin: Skin is warm and dry. Psychiatric: She has a normal mood and affect. Her behavior is normal.   Blood pressure 100/62, pulse 88, resp. rate 16, height 5' 5\" (1.651 m), weight 211 lb 6 oz (95.9 kg), last menstrual period 12/19/2014, not currently breastfeeding. Assessment:       Diagnosis Orders   1. Chronic sinusitis, unspecified location  External Referral To Allergy   2. Environmental allergies  External Referral To Allergy           Plan:       We will try to have her see an allergist  If we can't arrange that then perhaps the ENT at [facility]  See me as needed        Avelino Chiu MD

## 2019-06-03 ENCOUNTER — TELEPHONE (OUTPATIENT)
Dept: FAMILY MEDICINE CLINIC | Age: 44
End: 2019-06-03

## 2019-06-03 NOTE — TELEPHONE ENCOUNTER
Referral to Dr Karon Vieira scheduled for Wed. June 12 at 120pm. Pt to call and give them an email address or arrive 30 minutes early for the appt. Left MOM for pt to return call to the office to infrom.

## 2019-06-04 ENCOUNTER — TELEPHONE (OUTPATIENT)
Dept: FAMILY MEDICINE CLINIC | Age: 44
End: 2019-06-04

## 2019-06-06 RX ORDER — AMOXICILLIN AND CLAVULANATE POTASSIUM 875; 125 MG/1; MG/1
1 TABLET, FILM COATED ORAL 2 TIMES DAILY
Qty: 20 TABLET | Refills: 0 | Status: SHIPPED | OUTPATIENT
Start: 2019-06-06 | End: 2019-06-16

## 2019-06-14 ASSESSMENT — ENCOUNTER SYMPTOMS
NAUSEA: 0
SINUS PAIN: 0
COLOR CHANGE: 0
CHEST TIGHTNESS: 0
SORE THROAT: 0
ABDOMINAL DISTENTION: 0
SINUS PRESSURE: 0
ABDOMINAL PAIN: 0
WHEEZING: 0
VOMITING: 0
SHORTNESS OF BREATH: 0
CHOKING: 0
BLOOD IN STOOL: 0
COUGH: 0

## 2019-07-03 ENCOUNTER — TELEPHONE (OUTPATIENT)
Dept: CARDIOLOGY CLINIC | Age: 44
End: 2019-07-03

## 2019-07-29 ENCOUNTER — PROCEDURE VISIT (OUTPATIENT)
Dept: CARDIOLOGY CLINIC | Age: 44
End: 2019-07-29
Payer: COMMERCIAL

## 2019-07-29 DIAGNOSIS — Z95.0 PACEMAKER: Primary | ICD-10-CM

## 2019-07-29 PROCEDURE — 93296 REM INTERROG EVL PM/IDS: CPT | Performed by: INTERNAL MEDICINE

## 2019-07-29 PROCEDURE — 93294 REM INTERROG EVL PM/LDLS PM: CPT | Performed by: INTERNAL MEDICINE

## 2019-07-31 ENCOUNTER — TELEPHONE (OUTPATIENT)
Dept: CARDIOLOGY CLINIC | Age: 44
End: 2019-07-31

## 2019-08-02 ENCOUNTER — OFFICE VISIT (OUTPATIENT)
Dept: FAMILY MEDICINE CLINIC | Age: 44
End: 2019-08-02

## 2019-08-02 VITALS
BODY MASS INDEX: 33.88 KG/M2 | HEART RATE: 64 BPM | DIASTOLIC BLOOD PRESSURE: 82 MMHG | HEIGHT: 65 IN | SYSTOLIC BLOOD PRESSURE: 108 MMHG | WEIGHT: 203.38 LBS | RESPIRATION RATE: 16 BRPM

## 2019-08-02 DIAGNOSIS — F32.A ANXIETY AND DEPRESSION: Primary | ICD-10-CM

## 2019-08-02 DIAGNOSIS — F41.9 ANXIETY AND DEPRESSION: Primary | ICD-10-CM

## 2019-08-02 DIAGNOSIS — R52 PAIN IN PACEMAKER POCKET: ICD-10-CM

## 2019-08-02 PROCEDURE — 99213 OFFICE O/P EST LOW 20 MIN: CPT | Performed by: FAMILY MEDICINE

## 2019-08-02 PROCEDURE — G8427 DOCREV CUR MEDS BY ELIG CLIN: HCPCS | Performed by: FAMILY MEDICINE

## 2019-08-02 PROCEDURE — G8417 CALC BMI ABV UP PARAM F/U: HCPCS | Performed by: FAMILY MEDICINE

## 2019-08-02 RX ORDER — DULOXETIN HYDROCHLORIDE 20 MG/1
20 CAPSULE, DELAYED RELEASE ORAL 2 TIMES DAILY
Qty: 30 CAPSULE | Refills: 0 | Status: SHIPPED | OUTPATIENT
Start: 2019-08-02 | End: 2019-08-13 | Stop reason: DRUGHIGH

## 2019-08-02 ASSESSMENT — ENCOUNTER SYMPTOMS
SHORTNESS OF BREATH: 1
CONSTIPATION: 0
SINUS PRESSURE: 0

## 2019-08-02 NOTE — PROGRESS NOTES
BP Readings from Last 2 Encounters:   08/02/19 108/82   05/31/19 100/62     Hemoglobin A1C (%)   Date Value   05/30/2019 8.2 (H)   02/27/2019 8.9 (H)     Microalbumin, Random Urine (mg/dL)   Date Value   08/23/2017 4.77     LDL Calculated (mg/dL)   Date Value   10/09/2018 69              Tobacco use:  Patient  reports that she has been smoking cigarettes. She has a 10.00 pack-year smoking history. She has never used smokeless tobacco.    If Smoker - Cessation materials given? Yes    Is Daily aspirin on medication list?:  No    Diabetic retinal exam done? No   If yes, document in Health Maintenance. Monofilament placed on counter? No    Shoes and socks removed? Yes    BP taken with correct size cuff? Yes   Repeated if > 140/90 NA     Is patient taking any medications for diabetes    Yes   If yes, see medication list    Is the patient reporting any side effects of diabetic medications? No    Microalbumin performed if applicable? No      Is patient taking any over the counter medications    Yes   If yes, see medication list      Patient Self-Management Goal for Chronic Condition  Goal: I will take all medications as prescribed by my doctor, and I will call the office if I am having any medication problems. Barriers to success: none  Plan for overcoming my barriers: N/A     Confidence: 10/10  Date goal set: 8/2/19  Date goal attained:     Have you seen any other physician or provider since your last visit no    Have you had any other diagnostic tests since your last visit? no    Have you changed or stopped any medications since your last visit including any over-the-counter medicines, vitamins, or herbal medicines? no     Are you taking all your prescribed medications?  Yes    If NO, why?

## 2019-08-13 ENCOUNTER — OFFICE VISIT (OUTPATIENT)
Dept: FAMILY MEDICINE CLINIC | Age: 44
End: 2019-08-13

## 2019-08-13 VITALS
WEIGHT: 202.38 LBS | SYSTOLIC BLOOD PRESSURE: 118 MMHG | HEIGHT: 65 IN | RESPIRATION RATE: 16 BRPM | HEART RATE: 70 BPM | DIASTOLIC BLOOD PRESSURE: 72 MMHG | BODY MASS INDEX: 33.72 KG/M2

## 2019-08-13 DIAGNOSIS — F41.9 ANXIETY AND DEPRESSION: ICD-10-CM

## 2019-08-13 DIAGNOSIS — R52 PAIN IN PACEMAKER POCKET: Primary | ICD-10-CM

## 2019-08-13 DIAGNOSIS — F32.A ANXIETY AND DEPRESSION: ICD-10-CM

## 2019-08-13 PROCEDURE — G8427 DOCREV CUR MEDS BY ELIG CLIN: HCPCS | Performed by: FAMILY MEDICINE

## 2019-08-13 PROCEDURE — G8417 CALC BMI ABV UP PARAM F/U: HCPCS | Performed by: FAMILY MEDICINE

## 2019-08-13 PROCEDURE — 99213 OFFICE O/P EST LOW 20 MIN: CPT | Performed by: FAMILY MEDICINE

## 2019-08-13 RX ORDER — HYDROXYZINE 50 MG/1
50 TABLET, FILM COATED ORAL 3 TIMES DAILY PRN
Qty: 21 TABLET | Refills: 0 | Status: SHIPPED | OUTPATIENT
Start: 2019-08-13 | End: 2019-09-05 | Stop reason: SDUPTHER

## 2019-08-13 RX ORDER — DULOXETIN HYDROCHLORIDE 30 MG/1
30 CAPSULE, DELAYED RELEASE ORAL 2 TIMES DAILY
Qty: 60 CAPSULE | Refills: 0 | Status: SHIPPED | OUTPATIENT
Start: 2019-08-13 | End: 2022-10-07

## 2019-08-13 ASSESSMENT — ENCOUNTER SYMPTOMS
SHORTNESS OF BREATH: 0
CONSTIPATION: 0
SINUS PRESSURE: 0

## 2019-09-05 DIAGNOSIS — F41.9 ANXIETY AND DEPRESSION: ICD-10-CM

## 2019-09-05 DIAGNOSIS — R52 PAIN IN PACEMAKER POCKET: ICD-10-CM

## 2019-09-05 DIAGNOSIS — F32.A ANXIETY AND DEPRESSION: ICD-10-CM

## 2019-09-05 RX ORDER — DULOXETIN HYDROCHLORIDE 20 MG/1
CAPSULE, DELAYED RELEASE ORAL
Qty: 60 CAPSULE | Refills: 5 | Status: SHIPPED | OUTPATIENT
Start: 2019-09-05 | End: 2019-10-31

## 2019-09-05 RX ORDER — HYDROXYZINE 50 MG/1
TABLET, FILM COATED ORAL
Qty: 21 TABLET | Refills: 0 | Status: SHIPPED | OUTPATIENT
Start: 2019-09-05 | End: 2019-11-25 | Stop reason: ALTCHOICE

## 2019-10-29 ENCOUNTER — TELEPHONE (OUTPATIENT)
Dept: CARDIOLOGY CLINIC | Age: 44
End: 2019-10-29

## 2019-10-31 ENCOUNTER — OFFICE VISIT (OUTPATIENT)
Dept: CARDIOLOGY CLINIC | Age: 44
End: 2019-10-31
Payer: MEDICAID

## 2019-10-31 VITALS
HEART RATE: 66 BPM | HEIGHT: 65 IN | SYSTOLIC BLOOD PRESSURE: 112 MMHG | BODY MASS INDEX: 34.49 KG/M2 | WEIGHT: 207 LBS | DIASTOLIC BLOOD PRESSURE: 62 MMHG

## 2019-10-31 DIAGNOSIS — R07.89 ATYPICAL CHEST PAIN: ICD-10-CM

## 2019-10-31 DIAGNOSIS — E78.5 HYPERLIPIDEMIA, UNSPECIFIED HYPERLIPIDEMIA TYPE: ICD-10-CM

## 2019-10-31 DIAGNOSIS — M79.605 BILATERAL LEG PAIN: Primary | ICD-10-CM

## 2019-10-31 DIAGNOSIS — R42 DIZZINESS: ICD-10-CM

## 2019-10-31 DIAGNOSIS — Z87.09 HISTORY OF COPD: ICD-10-CM

## 2019-10-31 DIAGNOSIS — Z86.59 HISTORY OF ANXIETY: ICD-10-CM

## 2019-10-31 DIAGNOSIS — Z95.0 PACEMAKER: ICD-10-CM

## 2019-10-31 DIAGNOSIS — M79.604 BILATERAL LEG PAIN: Primary | ICD-10-CM

## 2019-10-31 DIAGNOSIS — E11.9 NON-INSULIN DEPENDENT TYPE 2 DIABETES MELLITUS (HCC): ICD-10-CM

## 2019-10-31 DIAGNOSIS — Z87.898 HISTORY OF SEIZURE: ICD-10-CM

## 2019-10-31 PROCEDURE — 93000 ELECTROCARDIOGRAM COMPLETE: CPT | Performed by: NURSE PRACTITIONER

## 2019-10-31 PROCEDURE — 99213 OFFICE O/P EST LOW 20 MIN: CPT | Performed by: NURSE PRACTITIONER

## 2019-10-31 RX ORDER — FLUTICASONE PROPIONATE 50 MCG
1 SPRAY, SUSPENSION (ML) NASAL DAILY PRN
Refills: 0 | COMMUNITY
Start: 2019-10-29 | End: 2020-02-14 | Stop reason: SDUPTHER

## 2019-11-01 PROBLEM — E11.9 NON-INSULIN DEPENDENT TYPE 2 DIABETES MELLITUS (HCC): Status: ACTIVE | Noted: 2019-11-01

## 2019-11-07 ENCOUNTER — TELEPHONE (OUTPATIENT)
Dept: CARDIOLOGY CLINIC | Age: 44
End: 2019-11-07

## 2019-11-14 ENCOUNTER — HOSPITAL ENCOUNTER (OUTPATIENT)
Dept: INTERVENTIONAL RADIOLOGY/VASCULAR | Age: 44
Discharge: HOME | End: 2019-11-14
Payer: MEDICAID

## 2019-11-14 ENCOUNTER — HOSPITAL ENCOUNTER (OUTPATIENT)
Dept: NON INVASIVE DIAGNOSTICS | Age: 44
Discharge: HOME | End: 2019-11-14
Payer: MEDICAID

## 2019-11-14 DIAGNOSIS — R42 DIZZINESS: ICD-10-CM

## 2019-11-14 DIAGNOSIS — M79.605 BILATERAL LEG PAIN: ICD-10-CM

## 2019-11-14 DIAGNOSIS — M79.604 BILATERAL LEG PAIN: ICD-10-CM

## 2019-11-14 LAB
LV EF: 58 %
LVEF MODALITY: NORMAL

## 2019-11-14 PROCEDURE — A9500 TC99M SESTAMIBI: HCPCS | Performed by: INTERNAL MEDICINE

## 2019-11-14 PROCEDURE — 93306 TTE W/DOPPLER COMPLETE: CPT

## 2019-11-14 PROCEDURE — 6360000002 HC RX W HCPCS

## 2019-11-14 PROCEDURE — 78452 HT MUSCLE IMAGE SPECT MULT: CPT

## 2019-11-14 PROCEDURE — 93017 CV STRESS TEST TRACING ONLY: CPT | Performed by: INTERNAL MEDICINE

## 2019-11-14 PROCEDURE — 3430000000 HC RX DIAGNOSTIC RADIOPHARMACEUTICAL: Performed by: INTERNAL MEDICINE

## 2019-11-14 PROCEDURE — 93922 UPR/L XTREMITY ART 2 LEVELS: CPT

## 2019-11-14 PROCEDURE — 2709999900 HC NON-CHARGEABLE SUPPLY

## 2019-11-14 RX ADMIN — Medication 9.4 MILLICURIE: at 11:43

## 2019-11-14 RX ADMIN — Medication 31.8 MILLICURIE: at 12:40

## 2019-11-15 ENCOUNTER — TELEPHONE (OUTPATIENT)
Dept: CARDIOLOGY CLINIC | Age: 44
End: 2019-11-15

## 2019-11-25 ENCOUNTER — OFFICE VISIT (OUTPATIENT)
Dept: FAMILY MEDICINE CLINIC | Age: 44
End: 2019-11-25

## 2019-11-25 VITALS
BODY MASS INDEX: 34.49 KG/M2 | SYSTOLIC BLOOD PRESSURE: 122 MMHG | WEIGHT: 207 LBS | HEART RATE: 60 BPM | DIASTOLIC BLOOD PRESSURE: 74 MMHG | RESPIRATION RATE: 14 BRPM | HEIGHT: 65 IN

## 2019-11-25 DIAGNOSIS — G56.02 CARPAL TUNNEL SYNDROME OF LEFT WRIST: ICD-10-CM

## 2019-11-25 DIAGNOSIS — M25.571 ACUTE RIGHT ANKLE PAIN: ICD-10-CM

## 2019-11-25 DIAGNOSIS — M79.671 RIGHT FOOT PAIN: Primary | ICD-10-CM

## 2019-11-25 PROCEDURE — 99213 OFFICE O/P EST LOW 20 MIN: CPT | Performed by: FAMILY MEDICINE

## 2019-11-25 ASSESSMENT — ENCOUNTER SYMPTOMS
COUGH: 0
SHORTNESS OF BREATH: 0
EYE PAIN: 0
ABDOMINAL PAIN: 0
CHEST TIGHTNESS: 0
NAUSEA: 0
WHEEZING: 0
SORE THROAT: 0
CONSTIPATION: 0

## 2019-12-05 ENCOUNTER — OFFICE VISIT (OUTPATIENT)
Dept: CARDIOLOGY CLINIC | Age: 44
End: 2019-12-05
Payer: MEDICAID

## 2019-12-05 ENCOUNTER — PROCEDURE VISIT (OUTPATIENT)
Dept: CARDIOLOGY CLINIC | Age: 44
End: 2019-12-05
Payer: MEDICAID

## 2019-12-05 VITALS
BODY MASS INDEX: 34.82 KG/M2 | WEIGHT: 209 LBS | SYSTOLIC BLOOD PRESSURE: 118 MMHG | HEART RATE: 60 BPM | DIASTOLIC BLOOD PRESSURE: 70 MMHG | HEIGHT: 65 IN

## 2019-12-05 DIAGNOSIS — R42 DIZZINESS: Primary | ICD-10-CM

## 2019-12-05 DIAGNOSIS — Z95.0 PACEMAKER: Primary | ICD-10-CM

## 2019-12-05 PROCEDURE — 99213 OFFICE O/P EST LOW 20 MIN: CPT | Performed by: INTERNAL MEDICINE

## 2019-12-05 PROCEDURE — 93294 REM INTERROG EVL PM/LDLS PM: CPT | Performed by: INTERNAL MEDICINE

## 2019-12-05 PROCEDURE — 93296 REM INTERROG EVL PM/IDS: CPT | Performed by: INTERNAL MEDICINE

## 2019-12-27 ENCOUNTER — HOSPITAL ENCOUNTER (EMERGENCY)
Dept: GENERAL RADIOLOGY | Age: 44
Discharge: HOME | End: 2019-12-27
Payer: MEDICAID

## 2019-12-27 ENCOUNTER — HOSPITAL ENCOUNTER (EMERGENCY)
Age: 44
Discharge: HOME | End: 2019-12-27
Payer: MEDICAID

## 2019-12-27 VITALS
TEMPERATURE: 96.5 F | HEART RATE: 61 BPM | RESPIRATION RATE: 16 BRPM | WEIGHT: 212 LBS | BODY MASS INDEX: 35.32 KG/M2 | HEIGHT: 65 IN | SYSTOLIC BLOOD PRESSURE: 134 MMHG | OXYGEN SATURATION: 98 % | DIASTOLIC BLOOD PRESSURE: 82 MMHG

## 2019-12-27 DIAGNOSIS — G56.02 CARPAL TUNNEL SYNDROME OF LEFT WRIST: Primary | ICD-10-CM

## 2019-12-27 PROCEDURE — 99214 OFFICE O/P EST MOD 30 MIN: CPT

## 2019-12-27 PROCEDURE — L3809 WHFO W/O JOINTS PRE OTS: HCPCS

## 2019-12-27 PROCEDURE — 73110 X-RAY EXAM OF WRIST: CPT

## 2019-12-27 PROCEDURE — 99213 OFFICE O/P EST LOW 20 MIN: CPT | Performed by: NURSE PRACTITIONER

## 2019-12-27 RX ORDER — PREDNISONE 20 MG/1
20 TABLET ORAL 2 TIMES DAILY
Qty: 14 TABLET | Refills: 0 | Status: SHIPPED | OUTPATIENT
Start: 2019-12-27 | End: 2020-01-03

## 2019-12-27 ASSESSMENT — PAIN - FUNCTIONAL ASSESSMENT: PAIN_FUNCTIONAL_ASSESSMENT: PREVENTS OR INTERFERES SOME ACTIVE ACTIVITIES AND ADLS

## 2019-12-27 ASSESSMENT — PAIN SCALES - GENERAL: PAINLEVEL_OUTOF10: 7

## 2019-12-27 ASSESSMENT — PAIN DESCRIPTION - DESCRIPTORS: DESCRIPTORS: ACHING

## 2019-12-27 ASSESSMENT — PAIN DESCRIPTION - PAIN TYPE: TYPE: ACUTE PAIN

## 2019-12-27 ASSESSMENT — PAIN DESCRIPTION - LOCATION: LOCATION: WRIST

## 2019-12-27 ASSESSMENT — PAIN DESCRIPTION - ONSET: ONSET: GRADUAL

## 2019-12-27 ASSESSMENT — PAIN DESCRIPTION - ORIENTATION: ORIENTATION: LEFT

## 2019-12-27 ASSESSMENT — PAIN DESCRIPTION - FREQUENCY: FREQUENCY: CONTINUOUS

## 2019-12-27 ASSESSMENT — PAIN DESCRIPTION - PROGRESSION: CLINICAL_PROGRESSION: GRADUALLY WORSENING

## 2019-12-30 ENCOUNTER — OFFICE VISIT (OUTPATIENT)
Dept: FAMILY MEDICINE CLINIC | Age: 44
End: 2019-12-30

## 2019-12-30 VITALS
WEIGHT: 215.13 LBS | SYSTOLIC BLOOD PRESSURE: 112 MMHG | HEART RATE: 72 BPM | HEIGHT: 65 IN | RESPIRATION RATE: 14 BRPM | DIASTOLIC BLOOD PRESSURE: 68 MMHG | BODY MASS INDEX: 35.84 KG/M2

## 2019-12-30 DIAGNOSIS — E11.9 NON-INSULIN DEPENDENT TYPE 2 DIABETES MELLITUS (HCC): Primary | ICD-10-CM

## 2019-12-30 DIAGNOSIS — G58.9 ENTRAPMENT NEUROPATHY: ICD-10-CM

## 2019-12-30 DIAGNOSIS — G62.9 NEUROPATHY: ICD-10-CM

## 2019-12-30 LAB
CHP ED QC CHECK: ABNORMAL
GLUCOSE BLD-MCNC: 185 MG/DL

## 2019-12-30 PROCEDURE — 82962 GLUCOSE BLOOD TEST: CPT | Performed by: FAMILY MEDICINE

## 2019-12-30 PROCEDURE — 99213 OFFICE O/P EST LOW 20 MIN: CPT | Performed by: FAMILY MEDICINE

## 2019-12-30 RX ORDER — GABAPENTIN 300 MG/1
CAPSULE ORAL
Qty: 30 CAPSULE | Refills: 0 | Status: SHIPPED | OUTPATIENT
Start: 2019-12-30 | End: 2021-12-20

## 2019-12-30 ASSESSMENT — ENCOUNTER SYMPTOMS
SINUS PRESSURE: 0
SHORTNESS OF BREATH: 0
CONSTIPATION: 0

## 2020-02-14 ENCOUNTER — HOSPITAL ENCOUNTER (EMERGENCY)
Age: 45
Discharge: HOME | End: 2020-02-14
Payer: MEDICARE

## 2020-02-14 ENCOUNTER — APPOINTMENT (OUTPATIENT)
Dept: GENERAL RADIOLOGY | Age: 45
End: 2020-02-14
Payer: MEDICARE

## 2020-02-14 VITALS
OXYGEN SATURATION: 98 % | WEIGHT: 211 LBS | HEART RATE: 64 BPM | BODY MASS INDEX: 35.16 KG/M2 | DIASTOLIC BLOOD PRESSURE: 87 MMHG | TEMPERATURE: 98.8 F | HEIGHT: 65 IN | RESPIRATION RATE: 18 BRPM | SYSTOLIC BLOOD PRESSURE: 126 MMHG

## 2020-02-14 LAB
FLU A ANTIGEN: NEGATIVE
INFLUENZA B AG, EIA: NEGATIVE

## 2020-02-14 PROCEDURE — 99214 OFFICE O/P EST MOD 30 MIN: CPT | Performed by: NURSE PRACTITIONER

## 2020-02-14 PROCEDURE — 87804 INFLUENZA ASSAY W/OPTIC: CPT

## 2020-02-14 PROCEDURE — 99214 OFFICE O/P EST MOD 30 MIN: CPT

## 2020-02-14 PROCEDURE — 71101 X-RAY EXAM UNILAT RIBS/CHEST: CPT

## 2020-02-14 RX ORDER — FLUTICASONE PROPIONATE 50 MCG
2 SPRAY, SUSPENSION (ML) NASAL DAILY
Qty: 1 BOTTLE | Refills: 0 | Status: SHIPPED | OUTPATIENT
Start: 2020-02-14 | End: 2022-10-07

## 2020-02-14 RX ORDER — CLINDAMYCIN HYDROCHLORIDE 300 MG/1
300 CAPSULE ORAL 3 TIMES DAILY
Qty: 30 CAPSULE | Refills: 0 | Status: SHIPPED | OUTPATIENT
Start: 2020-02-14 | End: 2020-02-24

## 2020-02-14 RX ORDER — PREDNISONE 20 MG/1
20 TABLET ORAL 2 TIMES DAILY
Qty: 10 TABLET | Refills: 0 | Status: SHIPPED | OUTPATIENT
Start: 2020-02-14 | End: 2020-02-19

## 2020-02-14 ASSESSMENT — PAIN SCALES - GENERAL: PAINLEVEL_OUTOF10: 7

## 2020-02-14 ASSESSMENT — ENCOUNTER SYMPTOMS
STRIDOR: 0
FACIAL SWELLING: 0
NAUSEA: 0
SHORTNESS OF BREATH: 0
CHEST TIGHTNESS: 0
RHINORRHEA: 0
COUGH: 0
SORE THROAT: 1
VOMITING: 0
SINUS PRESSURE: 0
VOICE CHANGE: 0
TROUBLE SWALLOWING: 0
WHEEZING: 0
ABDOMINAL PAIN: 0
SINUS PAIN: 0

## 2020-02-14 ASSESSMENT — PAIN DESCRIPTION - DESCRIPTORS: DESCRIPTORS: ACHING;SORE

## 2020-02-14 ASSESSMENT — PAIN DESCRIPTION - PAIN TYPE: TYPE: ACUTE PAIN

## 2020-02-14 ASSESSMENT — PAIN DESCRIPTION - ORIENTATION: ORIENTATION: MID

## 2020-02-14 ASSESSMENT — PAIN DESCRIPTION - PROGRESSION: CLINICAL_PROGRESSION: NOT CHANGED

## 2020-02-14 ASSESSMENT — PAIN DESCRIPTION - ONSET: ONSET: GRADUAL

## 2020-02-14 ASSESSMENT — PAIN - FUNCTIONAL ASSESSMENT: PAIN_FUNCTIONAL_ASSESSMENT: PREVENTS OR INTERFERES SOME ACTIVE ACTIVITIES AND ADLS

## 2020-02-14 ASSESSMENT — PAIN DESCRIPTION - FREQUENCY: FREQUENCY: CONTINUOUS

## 2020-02-14 ASSESSMENT — PAIN DESCRIPTION - LOCATION: LOCATION: BACK

## 2020-02-14 NOTE — ED PROVIDER NOTES
and headaches. Hematological: Negative for adenopathy. Psychiatric/Behavioral: The patient is not nervous/anxious. PAST MEDICAL HISTORY         Diagnosis Date    Anxiety     Anxiety and poor coping skills and recent altercations with her boyfriend's ex-girlfriend.  Arrhythmia     Possible arrhythmia induced shortness of breath.  Arthritis     Atypical chest pain     Bradycardia 3/19/2014    CAD (coronary artery disease)     Cancer (HCC)     Follicular thyroid cancer, S/P total thyroidectomy in 2005, radioactive iodine ablation in 2007.  Chest pain 3/19/2014    CHF (congestive heart failure) ([address]     Cholelithiasis 05/2019    COPD (chronic obstructive pulmonary disease) (HCC)     History of chronic obstructive pulmonary disease.  Cystocele, midline     Depression     Diabetes mellitus ([address]     DJD (degenerative joint disease)     Environmental allergies 5/14/2014    Fatty liver 6/2016    Female stress incontinence     Fibromyalgia 5/14/2014    Herpes 2016    History of alcohol abuse     History of heavy alcohol use, but recently quit.  History of MRSA infection     History of seizure disorder     History of syncope     History of syncopal episode.  Hx of blood clots     lower leg    Hyperlipidemia     Hypotension     Hypothyroidism      History of hypothyroidism but her TSH is 0.04 indicating hyperthyroidism.      MDRO (multiple drug resistant organisms) resistance 2005 2006    Medtronic dual pacer  6/8/2018    medtronic loop recorder 12/4/2012    Medtronic single pacer /AAI paced  6/9/4536    Metabolic syndrome      Metabolic syndrome     Migraine headache     Mild pulmonary hypertension (HCC)     Obesity, Class II, BMI 35.0-39.9, with comorbidity (see actual BMI)     PAC (premature atrial contraction)     Palpitations     Personal history of methicillin resistant Staphylococcus aureus     history of MRSA    PVC's      S/P ablation operation for arrhythmia 2003    Ep Study Ablation - [address], [facility].  Scoliosis     Seizures (HCC)     Sinus bradycardia     Sinus bradycardia with infrequent PACs.  Superficial phlebitis     Tachycardia     Type II or unspecified type diabetes mellitus without mention of complication, not stated as uncontrolled     Unstable angina (HCC)     Possible unstable angina. SURGICAL HISTORY     Patient  has a past surgical history that includes Thyroidectomy (2005); Tubal ligation (1999); Dilation and curettage of uterus (1995); cyst removal; cardiovascular stress test (3 30 2011); transthoracic echocardiogram (3 30 2011); cardiovascular stress test (9 15 2003); ablation of dysrhythmic focus (2010); laparoscopy (01/02/2013); bladder repair; Cardiac catheterization; Cardiac surgery (5 -8 -12); back surgery (2010); Abdomen surgery; Hysterectomy, total abdominal (1-12-16); Atrial ablation surgery (); other surgical history (04/27/2017); and Pacemaker insertion (05/30/2018). CURRENT MEDICATIONS       Discharge Medication List as of 2/14/2020  1:38 PM      CONTINUE these medications which have NOT CHANGED    Details   DULoxetine (CYMBALTA) 30 MG extended release capsule Take 1 capsule by mouth 2 times daily, Disp-60 capsule, R-0Normal      levothyroxine (SYNTHROID) 150 MCG tablet Take 1 tablet by mouth daily, Disp-30 tablet, R-5Normal      acetaminophen (TYLENOL) 500 MG tablet Take 1 tablet by mouth 4 times daily as needed for Pain, Disp-120 tablet, R-0Print      gabapentin (NEURONTIN) 300 MG capsule Take 1 capsule by mouth nightly for 1 day, THEN 1 capsule 2 times daily for 1 day, THEN 1 capsule 3 times daily for 1 day., Disp-30 capsule, R-0Normal      !! TRUE METRIX BLOOD GLUCOSE TEST strip 1 each by In Vitro route daily As needed. , Disp-100 each, R-3, DAWNormal      meclizine (ANTIVERT) 25 MG tablet Use  In am and  At night  And  Every  6 hours  As needed   dizziness, Disp-60 tablet, R-1Normal      !! glucose blood VI test strips (TRUE METRIX BLOOD GLUCOSE TEST) strip Disp-100 each, R-5, NormalUse to test blood glucose levels BID. Diagnosis: E11.65      potassium chloride (KLOR-CON M) 20 MEQ extended release tablet take 1 tablet by mouth once daily, Disp-30 tablet, R-7Normal      bumetanide (BUMEX) 1 MG tablet Take 1 mg by mouth daily as needed , R-0Historical Med      albuterol sulfate HFA (PROAIR HFA) 108 (90 BASE) MCG/ACT inhaler Inhale 2 puffs into the lungs every 4 hours as needed for Wheezing, Disp-1 Inhaler, R-1      Lancets MISC Disp-150 each, R-3, NormalCheck blood sugar 4 x daily (Dx: E11.65)       ! ! - Potential duplicate medications found. Please discuss with provider. ALLERGIES     Patient is is allergic to latex; niaspan [niacin er]; tape [adhesive tape]; and morphine. FAMILY HISTORY     Patient'sfamily history includes COPD in her mother; Diabetes in her mother; Heart Disease in her mother. SOCIAL HISTORY     Patient  reports that she has been smoking cigarettes. She has a 10.00 pack-year smoking history. She has never used smokeless tobacco. She reports current drug use. Drug: Marijuana. She reports that she does not drink alcohol. PHYSICAL EXAM     ED TRIAGE VITALS  BP: 126/87, Temp: 98.8 °F (37.1 °C), Pulse: 64, Resp: 18, SpO2: 98 %  Physical Exam  Vitals signs and nursing note reviewed. Constitutional:       General: She is not in acute distress. Appearance: Normal appearance. She is well-developed and well-groomed. She is obese. She is not ill-appearing, toxic-appearing or diaphoretic. HENT:      Head: Normocephalic and atraumatic. Right Ear: Hearing, ear canal and external ear normal. Drainage (sesrous, cloudy fluid) present. A middle ear effusion is present. No mastoid tenderness. No hemotympanum. Tympanic membrane is bulging. Tympanic membrane is not perforated, erythematous or retracted.       Left Ear: Hearing, ear canal and external ear normal. No drainage. A middle ear effusion (mild) is present. No mastoid tenderness. No hemotympanum. Tympanic membrane is not perforated, erythematous, retracted or bulging. Nose: Nose normal. No mucosal edema, congestion or rhinorrhea. Right Sinus: No maxillary sinus tenderness or frontal sinus tenderness. Left Sinus: No maxillary sinus tenderness or frontal sinus tenderness. Mouth/Throat:      Lips: Pink. No lesions. Mouth: Mucous membranes are moist.      Tongue: No lesions. Palate: No lesions. Pharynx: Oropharynx is clear. Uvula midline. No pharyngeal swelling, oropharyngeal exudate, posterior oropharyngeal erythema or uvula swelling. Tonsils: No tonsillar exudate or tonsillar abscesses. Swellin+ on the right. 2+ on the left. Eyes:      General:         Right eye: No discharge. Left eye: No discharge. Conjunctiva/sclera: Conjunctivae normal.      Right eye: Right conjunctiva is not injected. Left eye: Left conjunctiva is not injected. Pupils: Pupils are equal.   Neck:      Musculoskeletal: Normal range of motion. Cardiovascular:      Rate and Rhythm: Normal rate and regular rhythm. Heart sounds: Normal heart sounds. No murmur. Pulmonary:      Effort: Pulmonary effort is normal. No tachypnea, bradypnea, accessory muscle usage or respiratory distress. Breath sounds: Normal air entry. No stridor, decreased air movement or transmitted upper airway sounds. Examination of the right-lower field reveals decreased breath sounds. Examination of the left-lower field reveals decreased breath sounds. Decreased breath sounds present. No wheezing, rhonchi or rales. Chest:      Chest wall: Tenderness present. No mass, deformity, swelling, crepitus or edema. There is no dullness to percussion. Musculoskeletal:      Right knee: She exhibits normal range of motion. Left knee: She exhibits normal range of motion.    Lymphadenopathy: Head:      Right side of head: No submental, submandibular, tonsillar, preauricular or posterior auricular adenopathy. Left side of head: No submental, submandibular, tonsillar, preauricular or posterior auricular adenopathy. Cervical: No cervical adenopathy. Skin:     General: Skin is warm and dry. Capillary Refill: Capillary refill takes less than 2 seconds. Coloration: Skin is not pale. Findings: No rash. Nails: There is no clubbing. Comments: No obvious signs of infection or trauma. Neurological:      Mental Status: She is alert and oriented to person, place, and time. Sensory: No sensory deficit. Psychiatric:         Behavior: Behavior normal. Behavior is cooperative. DIAGNOSTIC RESULTS   Labs:  Results for orders placed or performed during the hospital encounter of 02/14/20   Rapid influenza A/B antigens   Result Value Ref Range    Flu A Antigen NEGATIVE NEGATIVE    Influenza B Ag, EIA NEGATIVE NEGATIVE       IMAGING:  XR RIBS LEFT INCLUDE CHEST (MIN 3 VIEWS)   Final Result   No lobar consolidation. No displaced rib fracture. **This report has been created using voice recognition software. It may contain minor errors which are inherent in voice recognition technology. **      Final report electronically signed by Dr. Sonido Muro on 2/14/2020 1:29 PM        URGENT CARE COURSE:     Vitals:    02/14/20 1248   BP: 126/87   Pulse: 64   Resp: 18   Temp: 98.8 °F (37.1 °C)   TempSrc: Temporal   SpO2: 98%   Weight: 211 lb (95.7 kg)   Height: 5' 5\" (1.651 m)       Medications - No data to display  PROCEDURES:  FINALIMPRESSION      1. Pleuritic chest pain    2. Acute pharyngitis, unspecified etiology    3. Non-recurrent acute serous otitis media of right ear    4. DONALD (middle ear effusion), left        DISPOSITION/PLAN   DISPOSITION    Discussed with patient in depth viral versus bacterial.  Patient denies any recent trauma, injury to chest or left ribs. She denies any recent cough. She is a current everyday smoker. Pain was reproducible to the left lateral side. No crepitus, deformity, respiratory distress. Lungs are clear however diminished in the bases. Declined toradol or solu medrol injection for symptoms  2 view chest x-ray reviewed no infiltrations, little effusions. No fracture/dislocation of the left rib. ED Course as of Feb 17 1326   Fri Feb 14, 2020   1312 Rapid influenza A/B antigens [AC]   1312 XR RIBS LEFT INCLUDE CHEST (MIN 3 VIEWS) [AC]      ED Course User Index  [AC] Sai Meyers, APRN - CNP       Problem List Items Addressed This Visit     None      Visit Diagnoses     Pleuritic chest pain    -  Primary    Acute pharyngitis, unspecified etiology        Relevant Medications    clindamycin (CLEOCIN) 300 MG capsule    Non-recurrent acute serous otitis media of right ear        Relevant Medications    clindamycin (CLEOCIN) 300 MG capsule    predniSONE (DELTASONE) 20 MG tablet    DONALD (middle ear effusion), left        Relevant Medications    predniSONE (DELTASONE) 20 MG tablet      Physical assessment findings, diagnostic testing(s) if applicable, and vital signs reviewed with patient/patient representative. Questions answered. If applicable, patient/patient representative will be contacted upon receipt of final culture and sensitivity or other testing results when available. Any additions or changes to medications or changes the plan of care will be made at that time. Medications as directed, including OTC medications for supportive care. Education provided on medications. Differential diagnosis(s) discussed with patient/patient representative. Home care/self care instructions reviewed with patient/patient representative. Patient is to follow-up with family care provider in 2-3 days if no improvement. Patient is to go to the emergency department if symptoms worsen.   Patient/patient representative is aware of care plan,

## 2020-02-14 NOTE — LETTER
[facility]  [address]  [address]  Phone: [phone]               February 14, 2020    Patient: Van Titus   YOB: 1975   Date of Visit: 2/14/2020       To Whom It May Concern:    Bennie Das was seen and treated in our emergency department on 2/14/2020. She may return to work on 2-.       Sincerely,       DARIANA Guadalupe CNP         Signature:___Electronically signed by DARIANA Guadalupe CNP on 2/14/2020 at 1:41 PM_______________________________

## 2020-02-18 ENCOUNTER — NURSE ONLY (OUTPATIENT)
Dept: CARDIOLOGY CLINIC | Age: 45
End: 2020-02-18
Payer: MEDICARE

## 2020-02-18 PROCEDURE — 93279 PRGRMG DEV EVAL PM/LDLS PM: CPT | Performed by: INTERNAL MEDICINE

## 2020-02-19 ENCOUNTER — HOSPITAL ENCOUNTER (EMERGENCY)
Age: 45
Discharge: HOME | End: 2020-02-19
Payer: MEDICARE

## 2020-02-19 VITALS
BODY MASS INDEX: 36.49 KG/M2 | HEART RATE: 66 BPM | RESPIRATION RATE: 18 BRPM | TEMPERATURE: 98.2 F | OXYGEN SATURATION: 96 % | DIASTOLIC BLOOD PRESSURE: 85 MMHG | WEIGHT: 219 LBS | SYSTOLIC BLOOD PRESSURE: 118 MMHG | HEIGHT: 65 IN

## 2020-02-19 PROCEDURE — 99212 OFFICE O/P EST SF 10 MIN: CPT

## 2020-02-19 PROCEDURE — 99213 OFFICE O/P EST LOW 20 MIN: CPT | Performed by: NURSE PRACTITIONER

## 2020-02-19 RX ORDER — INDOMETHACIN 25 MG/1
50 CAPSULE ORAL
Qty: 60 CAPSULE | Refills: 0 | Status: SHIPPED | OUTPATIENT
Start: 2020-02-19 | End: 2022-10-07

## 2020-02-19 RX ORDER — PREDNISONE 20 MG/1
20 TABLET ORAL DAILY
Qty: 5 TABLET | Refills: 0 | Status: SHIPPED | OUTPATIENT
Start: 2020-02-19 | End: 2020-02-24

## 2020-02-19 ASSESSMENT — PAIN DESCRIPTION - PAIN TYPE: TYPE: ACUTE PAIN

## 2020-02-19 ASSESSMENT — PAIN DESCRIPTION - LOCATION: LOCATION: BACK

## 2020-02-19 ASSESSMENT — PAIN - FUNCTIONAL ASSESSMENT: PAIN_FUNCTIONAL_ASSESSMENT: ACTIVITIES ARE NOT PREVENTED

## 2020-02-19 ASSESSMENT — PAIN DESCRIPTION - FREQUENCY: FREQUENCY: CONTINUOUS

## 2020-02-19 ASSESSMENT — PAIN DESCRIPTION - ORIENTATION: ORIENTATION: MID

## 2020-02-19 ASSESSMENT — PAIN DESCRIPTION - PROGRESSION: CLINICAL_PROGRESSION: GRADUALLY WORSENING

## 2020-02-19 ASSESSMENT — PAIN SCALES - GENERAL: PAINLEVEL_OUTOF10: 7

## 2020-02-19 ASSESSMENT — PAIN DESCRIPTION - ONSET: ONSET: GRADUAL

## 2020-02-19 ASSESSMENT — PAIN DESCRIPTION - DESCRIPTORS: DESCRIPTORS: ACHING;SORE

## 2020-02-19 NOTE — LETTER
[facility]  [address]  [address]  Phone: [phone]             February 19, 2020    Patient: Darian Cooper   YOB: 1975   Date of Visit: 2/19/2020       To Whom It May Concern:    Ana Richardson was seen and treated in our emergency department on 2/19/2020. She may return to work on 02/21/20.       Sincerely,             Signature:__________________________________

## 2020-02-20 ASSESSMENT — ENCOUNTER SYMPTOMS
ALLERGIC/IMMUNOLOGIC NEGATIVE: 1
SHORTNESS OF BREATH: 0
EYE REDNESS: 0
TROUBLE SWALLOWING: 0
NAUSEA: 0
EYE ITCHING: 0
VOMITING: 0
VOICE CHANGE: 0
SINUS PRESSURE: 0
RHINORRHEA: 0
ABDOMINAL PAIN: 0
COUGH: 0
STRIDOR: 0
WHEEZING: 0
SORE THROAT: 0

## 2020-02-20 NOTE — ED PROVIDER NOTES
[address]       Chief Complaint   Patient presents with    Back Pain     Mid       Nurses Notes reviewed and I agree except as noted in the HPI. HISTORY OF PRESENT ILLNESS   Madge Sicard is a 40 y.o. female who presents to the urgent care with left rib pain that has not resolved. Patient is an everyday smoker. She had to leave work early tonight due to the pain. She denies any chest pain, shortness of breath, fevers. Patient was seen at this urgent care for the same symptoms on 14th. She was prescribed prednisone 20 mg twice a day for 5 days pt states she is still having symptoms. REVIEW OF SYSTEMS     Review of Systems   Constitutional: Negative for activity change, appetite change, chills, diaphoresis, fatigue and fever. HENT: Negative for congestion, ear pain, postnasal drip, rhinorrhea, sinus pressure, sore throat, trouble swallowing and voice change. Eyes: Negative for redness and itching. Respiratory: Positive for chest tightness. Negative for cough, shortness of breath, wheezing and stridor. Chest tenderness   Cardiovascular: Negative for chest pain, palpitations and leg swelling. Gastrointestinal: Negative for abdominal pain, nausea and vomiting. Genitourinary: Negative for decreased urine volume and difficulty urinating. Musculoskeletal: Positive for back pain. Negative for myalgias. Skin: Negative for pallor and rash. Allergic/Immunologic: Negative. Neurological: Negative for dizziness, weakness, light-headedness, numbness and headaches. Hematological: Negative for adenopathy. Psychiatric/Behavioral: The patient is not nervous/anxious. PAST MEDICAL HISTORY         Diagnosis Date    Anxiety     Anxiety and poor coping skills and recent altercations with her boyfriend's ex-girlfriend.  Arrhythmia     Possible arrhythmia induced shortness of breath.     Arthritis     Atypical chest unstable angina. SURGICAL HISTORY     Patient  has a past surgical history that includes Thyroidectomy (2005); Tubal ligation (1999); Dilation and curettage of uterus (1995); cyst removal; cardiovascular stress test (3 30 2011); transthoracic echocardiogram (3 30 2011); cardiovascular stress test (9 15 2003); ablation of dysrhythmic focus (2010); laparoscopy (01/02/2013); bladder repair; Cardiac catheterization; Cardiac surgery (5 -8 -12); back surgery (2010); Abdomen surgery; Hysterectomy, total abdominal (1-12-16); Atrial ablation surgery (); other surgical history (04/27/2017); and Pacemaker insertion (05/30/2018). CURRENT MEDICATIONS       Discharge Medication List as of 2/19/2020  8:22 PM      CONTINUE these medications which have NOT CHANGED    Details   fluticasone (FLONASE) 50 MCG/ACT nasal spray 2 sprays by Nasal route daily, Disp-1 Bottle, R-0Normal      clindamycin (CLEOCIN) 300 MG capsule Take 1 capsule by mouth 3 times daily for 10 days, Disp-30 capsule, R-0Normal      DULoxetine (CYMBALTA) 30 MG extended release capsule Take 1 capsule by mouth 2 times daily, Disp-60 capsule, R-0Normal      levothyroxine (SYNTHROID) 150 MCG tablet Take 1 tablet by mouth daily, Disp-30 tablet, R-5Normal      meclizine (ANTIVERT) 25 MG tablet Use  In am and  At night  And  Every  6 hours  As needed   dizziness, Disp-60 tablet, R-1Normal      gabapentin (NEURONTIN) 300 MG capsule Take 1 capsule by mouth nightly for 1 day, THEN 1 capsule 2 times daily for 1 day, THEN 1 capsule 3 times daily for 1 day., Disp-30 capsule, R-0Normal      !! TRUE METRIX BLOOD GLUCOSE TEST strip 1 each by In Vitro route daily As needed. , Disp-100 each, R-3, DAWNormal      acetaminophen (TYLENOL) 500 MG tablet Take 1 tablet by mouth 4 times daily as needed for Pain, Disp-120 tablet, R-0Print      !! glucose blood VI test strips (TRUE METRIX BLOOD GLUCOSE TEST) strip Disp-100 each, R-5, NormalUse to test blood glucose levels BID. Diagnosis: E11.65      potassium chloride (KLOR-CON M) 20 MEQ extended release tablet take 1 tablet by mouth once daily, Disp-30 tablet, R-7Normal      bumetanide (BUMEX) 1 MG tablet Take 1 mg by mouth daily as needed , R-0Historical Med      albuterol sulfate HFA (PROAIR HFA) 108 (90 BASE) MCG/ACT inhaler Inhale 2 puffs into the lungs every 4 hours as needed for Wheezing, Disp-1 Inhaler, R-1      Lancets MISC Disp-150 each, R-3, NormalCheck blood sugar 4 x daily (Dx: E11.65)       ! ! - Potential duplicate medications found. Please discuss with provider. ALLERGIES     Patient is is allergic to latex; niaspan [niacin er]; tape [adhesive tape]; and morphine. FAMILY HISTORY     Patient'sfamily history includes COPD in her mother; Diabetes in her mother; Heart Disease in her mother. SOCIAL HISTORY     Patient  reports that she has been smoking cigarettes. She has a 10.00 pack-year smoking history. She has never used smokeless tobacco. She reports current drug use. Drug: Marijuana. She reports that she does not drink alcohol. PHYSICAL EXAM     ED TRIAGE VITALS  BP: 118/85, Temp: 98.2 °F (36.8 °C), Pulse: 66, Resp: 18, SpO2: 96 %  Physical Exam  Vitals signs and nursing note reviewed. Constitutional:       General: She is not in acute distress. Appearance: Normal appearance. She is well-developed and well-groomed. She is not ill-appearing, toxic-appearing or diaphoretic. HENT:      Head: Normocephalic and atraumatic. Right Ear: Hearing, tympanic membrane, ear canal and external ear normal.      Left Ear: Hearing, tympanic membrane, ear canal and external ear normal.      Nose: Nose normal. No congestion or rhinorrhea. Mouth/Throat:      Lips: Pink. No lesions. Mouth: Mucous membranes are moist. No oral lesions. Tongue: No lesions. Palate: No lesions. Pharynx: Oropharynx is clear. Uvula midline.  No pharyngeal swelling, oropharyngeal exudate, posterior oropharyngeal

## 2020-02-26 ENCOUNTER — HOSPITAL ENCOUNTER (OUTPATIENT)
Age: 45
Setting detail: SPECIMEN
Discharge: HOME | End: 2020-02-26
Payer: MEDICARE

## 2020-02-26 LAB
ALBUMIN SERPL-MCNC: 4.3 G/DL (ref 3.5–5.2)
ALBUMIN/GLOBULIN RATIO: 1.3 (ref 1–2.5)
ALP BLD-CCNC: 61 U/L (ref 35–104)
ALT SERPL-CCNC: 17 U/L (ref 5–33)
ANION GAP SERPL CALCULATED.3IONS-SCNC: 13 MMOL/L (ref 9–17)
AST SERPL-CCNC: 24 U/L
BILIRUB SERPL-MCNC: 0.44 MG/DL (ref 0.3–1.2)
BUN BLDV-MCNC: 13 MG/DL (ref 6–20)
BUN/CREAT BLD: ABNORMAL (ref 9–20)
CALCIUM SERPL-MCNC: 9.3 MG/DL (ref 8.6–10.4)
CHLORIDE BLD-SCNC: 100 MMOL/L (ref 98–107)
CHOLESTEROL/HDL RATIO: 4
CHOLESTEROL: 208 MG/DL
CO2: 27 MMOL/L (ref 20–31)
CREAT SERPL-MCNC: 0.71 MG/DL (ref 0.5–0.9)
GFR AFRICAN AMERICAN: >60 ML/MIN
GFR NON-AFRICAN AMERICAN: >60 ML/MIN
GFR SERPL CREATININE-BSD FRML MDRD: ABNORMAL ML/MIN/{1.73_M2}
GFR SERPL CREATININE-BSD FRML MDRD: ABNORMAL ML/MIN/{1.73_M2}
GLUCOSE BLD-MCNC: 142 MG/DL (ref 70–99)
HAV IGM SER IA-ACNC: NONREACTIVE
HCT VFR BLD CALC: 48.9 % (ref 36.3–47.1)
HDLC SERPL-MCNC: 52 MG/DL
HEMOGLOBIN: 15.5 G/DL (ref 11.9–15.1)
HEPATITIS B CORE IGM ANTIBODY: NONREACTIVE
HEPATITIS B SURFACE ANTIGEN: NONREACTIVE
HEPATITIS C ANTIBODY: NONREACTIVE
LDL CHOLESTEROL: 122 MG/DL (ref 0–130)
MCH RBC QN AUTO: 32.1 PG (ref 25.2–33.5)
MCHC RBC AUTO-ENTMCNC: 31.7 G/DL (ref 28.4–34.8)
MCV RBC AUTO: 101.2 FL (ref 82.6–102.9)
NRBC AUTOMATED: 0 PER 100 WBC
PDW BLD-RTO: 13.4 % (ref 11.8–14.4)
PLATELET # BLD: 253 K/UL (ref 138–453)
PMV BLD AUTO: 10.2 FL (ref 8.1–13.5)
POTASSIUM SERPL-SCNC: 4.3 MMOL/L (ref 3.7–5.3)
RBC # BLD: 4.83 M/UL (ref 3.95–5.11)
SODIUM BLD-SCNC: 140 MMOL/L (ref 135–144)
TOTAL PROTEIN: 7.5 G/DL (ref 6.4–8.3)
TRIGL SERPL-MCNC: 172 MG/DL
TSH SERPL DL<=0.05 MIU/L-ACNC: 137.73 MIU/L (ref 0.3–5)
VLDLC SERPL CALC-MCNC: ABNORMAL MG/DL (ref 1–30)
WBC # BLD: 9.5 K/UL (ref 3.5–11.3)

## 2020-02-27 LAB
ABSOLUTE BASO #: 100 /CMM (ref 0–200)
ABSOLUTE EOS #: 200 /CMM (ref 0–500)
ABSOLUTE LYMPH #: 4100 /CMM (ref 1000–4800)
ABSOLUTE MONO #: 300 /CMM (ref 0–800)
ABSOLUTE NEUT #: 4900 /CMM (ref 1800–7700)
ALBUMIN SERPL-MCNC: 4.3 GM/DL (ref 3.5–5)
ALP BLD-CCNC: 52 IU/L (ref 39–118)
ALT SERPL-CCNC: 13 IU/L (ref 10–40)
ANION GAP SERPL CALCULATED.3IONS-SCNC: 6 MMOL/L (ref 4–12)
AST SERPL-CCNC: 21 IU/L (ref 15–41)
BASOPHILS RELATIVE PERCENT: 1.1 % (ref 0–2)
BILIRUB SERPL-MCNC: 0.5 MG/DL (ref 0.2–1)
BILIRUBIN DIRECT: 0.1 MG/DL (ref 0.1–0.2)
BUN BLDV-MCNC: 12 MG/DL (ref 7–20)
CALCIUM SERPL-MCNC: 9.7 MG/DL (ref 8.8–10.5)
CHLORIDE BLD-SCNC: 101 MEQ/L (ref 101–111)
CO2: 30 MEQ/L (ref 21–32)
CREAT SERPL-MCNC: 0.84 MG/DL (ref 0.6–1.3)
CREATININE CLEARANCE: >60
EOSINOPHILS RELATIVE PERCENT: 1.7 % (ref 0–6)
GLUCOSE: 102 MG/DL (ref 70–110)
HCT VFR BLD CALC: 40 % (ref 35–44)
HEMOGLOBIN: 13.7 GM/DL (ref 12–15)
LYMPHOCYTES RELATIVE PERCENT: 42.4 % (ref 15–45)
MAGNESIUM: 1.7 MG/DL (ref 1.8–2.5)
MCH RBC QN AUTO: 32.5 PG (ref 27.5–33)
MCHC RBC AUTO-ENTMCNC: 34.2 GM/DL (ref 33–36)
MCV RBC AUTO: 94.8 CU MIC (ref 80–97)
MONOCYTES RELATIVE PERCENT: 3.5 % (ref 2–10)
NEUTROPHILS RELATIVE PERCENT: 51.3 % (ref 40–70)
NUCLEATED RBCS: 0.1 /100 WBC
PDW BLD-RTO: 13.8 % (ref 12–16)
PLATELET # BLD: 223 TH/CMM (ref 150–400)
POTASSIUM SERPL-SCNC: 3.2 MEQ/L (ref 3.6–5)
RBC # BLD: 4.22 MIL/CMM (ref 4–5.1)
SODIUM BLD-SCNC: 137 MEQ/L (ref 135–145)
THYROXINE, FREE: 0.21 NG/DL (ref 0.93–1.7)
TOTAL PROTEIN: 7.3 G/DL (ref 6.2–8)
TROPONIN I: < 0.01 NG/ML (ref 0–0.02)
WBC # BLD: 9.6 TH/CMM (ref 4.4–10.5)

## 2020-03-02 ASSESSMENT — ENCOUNTER SYMPTOMS
BACK PAIN: 1
CHEST TIGHTNESS: 1

## 2020-03-11 ENCOUNTER — HOSPITAL ENCOUNTER (OUTPATIENT)
Age: 45
Setting detail: SPECIMEN
Discharge: HOME | End: 2020-03-11
Payer: MEDICARE

## 2020-03-13 LAB
CHLAMYDIA BY THIN PREP: NEGATIVE
HPV SAMPLE: NORMAL
HPV, GENOTYPE 16: NOT DETECTED
HPV, GENOTYPE 18: NOT DETECTED
HPV, HIGH RISK OTHER: NOT DETECTED
HPV, INTERPRETATION: NORMAL
N. GONORRHOEAE DNA, THIN PREP: NEGATIVE
SPECIMEN DESCRIPTION: NORMAL
SPECIMEN DESCRIPTION: NORMAL

## 2020-03-19 LAB — CYTOLOGY REPORT: NORMAL

## 2020-03-25 PROBLEM — R55 VASOVAGAL SYNCOPE: Status: RESOLVED | Noted: 2017-04-10 | Resolved: 2020-03-24

## 2020-06-09 ENCOUNTER — TELEPHONE (OUTPATIENT)
Dept: CARDIOLOGY CLINIC | Age: 45
End: 2020-06-09

## 2020-06-10 ENCOUNTER — PROCEDURE VISIT (OUTPATIENT)
Dept: CARDIOLOGY CLINIC | Age: 45
End: 2020-06-10
Payer: MEDICARE

## 2020-06-10 PROCEDURE — 93296 REM INTERROG EVL PM/IDS: CPT | Performed by: INTERNAL MEDICINE

## 2020-06-10 PROCEDURE — 93294 REM INTERROG EVL PM/LDLS PM: CPT | Performed by: INTERNAL MEDICINE

## 2020-06-10 NOTE — PROGRESS NOTES
[facility]     9.5 years on device   imped 392  P waves 2.8  10.1% atrial paced   tachy

## 2020-06-23 ENCOUNTER — HOSPITAL ENCOUNTER (OUTPATIENT)
Age: 45
Setting detail: SPECIMEN
Discharge: HOME | End: 2020-06-23
Payer: COMMERCIAL

## 2020-06-23 LAB — TSH SERPL DL<=0.05 MIU/L-ACNC: 44.09 MIU/L (ref 0.3–5)

## 2020-06-24 LAB — THYROXINE, FREE: 0.46 NG/DL (ref 0.93–1.7)

## 2020-07-30 RX ORDER — SODIUM CHLORIDE 450 MG/100ML
INJECTION, SOLUTION INTRAVENOUS CONTINUOUS
Status: CANCELLED | OUTPATIENT
Start: 2020-07-30

## 2020-07-31 ENCOUNTER — HOSPITAL ENCOUNTER (OUTPATIENT)
Dept: GENERAL RADIOLOGY | Age: 45
Discharge: HOME | End: 2020-07-31
Payer: COMMERCIAL

## 2020-07-31 RX ORDER — SODIUM CHLORIDE 450 MG/100ML
INJECTION, SOLUTION INTRAVENOUS CONTINUOUS
Status: DISCONTINUED | OUTPATIENT
Start: 2020-07-31 | End: 2020-08-01 | Stop reason: HOSPADM

## 2020-07-31 NOTE — PROGRESS NOTES
0915 pt arrives to [facility] for Myelogram. Pt is alone, does not have a  and can not get a   [address]  Lynsey Washburn 45 Dr Claire Zhu updated, pt will need to reschedule procedure  0920 reviewed home meds, pre myelogram screening medications checklist reviewed and copy given to pt  0925 WRITTEN DISCHARGE INSTRUCTIONS GIVEN TO PT-VERBALIZES UNDERSTANDING  0930 PT DISCHARGED AMBULATORY IN SATISFACTORY CONDITION

## 2020-08-11 RX ORDER — SODIUM CHLORIDE 450 MG/100ML
INJECTION, SOLUTION INTRAVENOUS CONTINUOUS
Status: CANCELLED | OUTPATIENT
Start: 2020-08-11

## 2020-08-14 ENCOUNTER — HOSPITAL ENCOUNTER (EMERGENCY)
Age: 45
Discharge: HOME | End: 2020-08-14
Payer: MEDICAID

## 2020-08-14 ENCOUNTER — APPOINTMENT (OUTPATIENT)
Dept: GENERAL RADIOLOGY | Age: 45
End: 2020-08-14
Payer: MEDICAID

## 2020-08-14 VITALS
OXYGEN SATURATION: 95 % | RESPIRATION RATE: 16 BRPM | TEMPERATURE: 98.1 F | HEIGHT: 65 IN | HEART RATE: 93 BPM | DIASTOLIC BLOOD PRESSURE: 75 MMHG | BODY MASS INDEX: 34.99 KG/M2 | WEIGHT: 210 LBS | SYSTOLIC BLOOD PRESSURE: 119 MMHG

## 2020-08-14 PROCEDURE — 73630 X-RAY EXAM OF FOOT: CPT

## 2020-08-14 PROCEDURE — 99213 OFFICE O/P EST LOW 20 MIN: CPT | Performed by: NURSE PRACTITIONER

## 2020-08-14 PROCEDURE — 99213 OFFICE O/P EST LOW 20 MIN: CPT

## 2020-08-14 RX ORDER — OXCARBAZEPINE 300 MG/1
300 TABLET, FILM COATED ORAL 2 TIMES DAILY
COMMUNITY
End: 2022-10-07

## 2020-08-14 ASSESSMENT — PAIN DESCRIPTION - PAIN TYPE: TYPE: ACUTE PAIN

## 2020-08-14 ASSESSMENT — PAIN - FUNCTIONAL ASSESSMENT: PAIN_FUNCTIONAL_ASSESSMENT: PREVENTS OR INTERFERES SOME ACTIVE ACTIVITIES AND ADLS

## 2020-08-14 ASSESSMENT — PAIN DESCRIPTION - DESCRIPTORS: DESCRIPTORS: THROBBING

## 2020-08-14 ASSESSMENT — PAIN DESCRIPTION - LOCATION: LOCATION: TOE (COMMENT WHICH ONE)

## 2020-08-14 ASSESSMENT — PAIN SCALES - GENERAL: PAINLEVEL_OUTOF10: 9

## 2020-08-14 NOTE — ED TRIAGE NOTES
Patient to room via wheelchair due to pain in right great toe. States she kicked her dog gate today about 30 minutes ago and rates pain 9/10    Ice pack applied to right great toe.

## 2020-08-18 ASSESSMENT — ENCOUNTER SYMPTOMS
SHORTNESS OF BREATH: 0
COLOR CHANGE: 0

## 2020-08-18 NOTE — ED PROVIDER NOTES
[id number]       Chief Complaint   Patient presents with    Toe Injury     right great toe       Nurses Notes reviewed and I agree except as noted in the HPI. HISTORY OF PRESENT ILLNESS   Bijan Basurto is a 39 y.o. female who presents to the urgent care for evaluation of her right great toe after kicking her dog gate today about 30 minutes ago and rates her pain a 9 out of 10, throbbing in nature, the pain is at the base of the great toe. No over-the-counter medications prior to arrival.  Denies any previous injuries to the toe. Denies any numbness, tingling, weakness. Past medical history of fibromyalgia, arthritis  She has a current everyday smoker. REVIEW OF SYSTEMS     Review of Systems   Constitutional: Negative for activity change, appetite change, chills, diaphoresis, fatigue and fever. Respiratory: Negative for shortness of breath. Cardiovascular: Negative for chest pain and leg swelling. Musculoskeletal: Positive for arthralgias (Rt great pain.  ) and gait problem (able to ambulate. Favors, limping due to right great toe pain. ). Negative for joint swelling and myalgias. Skin: Negative for color change, pallor, rash and wound. No redness, no warmth, no bruising. Neurological: Negative for weakness and numbness. Hematological: Does not bruise/bleed easily. Psychiatric/Behavioral: The patient is not nervous/anxious. PAST MEDICAL HISTORY         Diagnosis Date    Anxiety     Anxiety and poor coping skills and recent altercations with her boyfriend's ex-girlfriend.  Arrhythmia     Possible arrhythmia induced shortness of breath.  Arthritis     Atypical chest pain     Bradycardia 3/19/2014    CAD (coronary artery disease)     Cancer (HCC)     Follicular thyroid cancer, S/P total thyroidectomy in 2005, radioactive iodine ablation in 2007.      Chest pain 3/19/2014    CHF [facility]congestive heart failure) ([facility] [address]     Cholelithiasis 05/2019    COPD (chronic obstructive pulmonary disease) (HCC)     History of chronic obstructive pulmonary disease.  Cystocele, midline     Depression     Diabetes mellitus ([facility] [address]     DJD (degenerative joint disease)     Environmental allergies 5/14/2014    Fatty liver 6/2016    Female stress incontinence     Fibromyalgia 5/14/2014    Herpes 2016    History of alcohol abuse     History of heavy alcohol use, but recently quit.  History of MRSA infection     History of seizure disorder     History of syncope     History of syncopal episode.  Hx of blood clots     lower leg    Hyperlipidemia     Hypotension     Hypothyroidism      History of hypothyroidism but her TSH is 0.04 indicating hyperthyroidism.  MDRO (multiple drug resistant organisms) resistance 2005 2006    Medtronic dual pacer  6/8/2018    medtronic loop recorder 12/4/2012    Medtronic single pacer /AAI paced  4/6/5462    Metabolic syndrome      Metabolic syndrome     Migraine headache     Mild pulmonary hypertension (HCC)     Obesity, Class II, BMI 35.0-39.9, with comorbidity (see actual BMI)     PAC (premature atrial contraction)     Palpitations     Personal history of methicillin resistant Staphylococcus aureus     history of MRSA    PVC's      S/P ablation operation for arrhythmia 2003    Ep Study Ablation - [place], [place].  Scoliosis     Seizures (HCC)     Sinus bradycardia     Sinus bradycardia with infrequent PACs.  Superficial phlebitis     Tachycardia     Type II or unspecified type diabetes mellitus without mention of complication, not stated as uncontrolled     Unstable angina (HCC)     Possible unstable angina. SURGICAL HISTORY     Patient  has a past surgical history that includes Thyroidectomy (2005); Tubal ligation (1999);  Dilation and curettage of uterus (1995); cyst removal; cardiovascular stress test (3 30 2011); transthoracic echocardiogram (3 30 2011); cardiovascular stress test (9 15 2003); ablation of dysrhythmic focus (2010); laparoscopy (01/02/2013); bladder repair; Cardiac catheterization; Cardiac surgery (5 -8 -12); back surgery (2010); Abdomen surgery; Hysterectomy, total abdominal (1-12-16); Atrial ablation surgery (); other surgical history (04/27/2017); and Pacemaker insertion (05/30/2018). CURRENT MEDICATIONS       Discharge Medication List as of 8/14/2020  2:19 PM      CONTINUE these medications which have NOT CHANGED    Details   OXcarbazepine (TRILEPTAL) 300 MG tablet Take 300 mg by mouth 2 times dailyHistorical Med      Cariprazine HCl (VRAYLAR PO) Take by mouthHistorical Med      gabapentin (NEURONTIN) 300 MG capsule Take 1 capsule by mouth nightly for 1 day, THEN 1 capsule 2 times daily for 1 day, THEN 1 capsule 3 times daily for 1 day., Disp-30 capsule,R-0Normal      DULoxetine (CYMBALTA) 30 MG extended release capsule Take 1 capsule by mouth 2 times daily, Disp-60 capsule, R-0Normal      levothyroxine (SYNTHROID) 150 MCG tablet Take 1 tablet by mouth daily, Disp-30 tablet, R-5Normal      acetaminophen (TYLENOL) 500 MG tablet Take 1 tablet by mouth 4 times daily as needed for Pain, Disp-120 tablet, R-0Print      indomethacin (INDOCIN) 25 MG capsule Take 2 capsules by mouth 3 times daily (with meals) for 10 days, Disp-60 capsule, R-0Normal      fluticasone (FLONASE) 50 MCG/ACT nasal spray 2 sprays by Nasal route daily, Disp-1 Bottle, R-0Normal      !! TRUE METRIX BLOOD GLUCOSE TEST strip 1 each by In Vitro route daily As needed. , Disp-100 each, R-3, DAWNormal      meclizine (ANTIVERT) 25 MG tablet Use  In am and  At night  And  Every  6 hours  As needed   dizziness, Disp-60 tablet, R-1Normal      !! glucose blood VI test strips (TRUE METRIX BLOOD GLUCOSE TEST) strip Disp-100 each, R-5, NormalUse to test blood glucose levels BID.  Diagnosis: E11.65      potassium chloride (KLOR-CON M) 20 MEQ extended release tablet take 1 tablet by mouth once daily, Disp-30 tablet, R-7Normal      bumetanide (BUMEX) 1 MG tablet Take 1 mg by mouth daily as needed , R-0Historical Med      albuterol sulfate HFA (PROAIR HFA) 108 (90 BASE) MCG/ACT inhaler Inhale 2 puffs into the lungs every 4 hours as needed for Wheezing, Disp-1 Inhaler, R-1      Lancets MISC Disp-150 each, R-3, NormalCheck blood sugar 4 x daily (Dx: E11.65)       ! ! - Potential duplicate medications found. Please discuss with provider. ALLERGIES     Patient is is allergic to latex; niaspan [niacin er]; tape [adhesive tape]; and morphine. FAMILY HISTORY     Patient'sfamily history includes COPD in her mother; Diabetes in her mother; Heart Disease in her mother. SOCIAL HISTORY     Patient  reports that she has been smoking cigarettes. She has a 10.00 pack-year smoking history. She has never used smokeless tobacco. She reports current drug use. Drug: Marijuana. She reports that she does not drink alcohol. PHYSICAL EXAM     ED TRIAGE VITALS  BP: 119/75, Temp: 98.1 °F (36.7 °C), Pulse: 93, Resp: 16, SpO2: 95 %  Physical Exam  Vitals signs and nursing note reviewed. Constitutional:       General: She is not in acute distress. Appearance: Normal appearance. She is well-developed and well-groomed. She is obese. She is not ill-appearing, toxic-appearing or diaphoretic. HENT:      Head: Normocephalic and atraumatic. Right Ear: Hearing normal.      Left Ear: Hearing normal.   Eyes:      Conjunctiva/sclera:      Right eye: Right conjunctiva is not injected. Left eye: Left conjunctiva is not injected. Pupils: Pupils are equal.   Neck:      Musculoskeletal: Normal range of motion. Cardiovascular:      Rate and Rhythm: Normal rate and regular rhythm. Pulses: Normal pulses. Dorsalis pedis pulses are 2+ on the right side. Pulmonary:      Effort: Pulmonary effort is normal. No respiratory distress.       Breath sounds: Normal breath sounds and air entry. No stridor, decreased air movement or transmitted upper airway sounds. No decreased breath sounds, wheezing, rhonchi or rales. Musculoskeletal:         General: Tenderness present. No swelling, deformity or signs of injury. Right knee: She exhibits normal range of motion. Left knee: She exhibits normal range of motion. Right ankle: Normal.      Right lower leg: No edema. Left lower leg: No edema. Right foot: Decreased range of motion (limited Rt great toe due pain, flexion. ). Normal capillary refill. Tenderness (pain at base of right great toe, produced  with palpation, flexion) present. No bony tenderness, swelling, crepitus, deformity or laceration. Skin:     General: Skin is warm and dry. Capillary Refill: Capillary refill takes less than 2 seconds. Coloration: Skin is not jaundiced or pale. Findings: No bruising, ecchymosis, erythema, lesion, petechiae, rash or wound. Comments: No obvious signs of infection or trauma. Neurological:      Mental Status: She is alert and oriented to person, place, and time. Sensory: Sensation is intact. No sensory deficit. Motor: No weakness. Gait: Gait abnormal (limping, favoring right foot). Psychiatric:         Behavior: Behavior normal. Behavior is cooperative. DIAGNOSTIC RESULTS   Labs:  Abnormal Labs Reviewed - No data to display     IMAGING:  XR FOOT RIGHT (MIN 3 VIEWS)   Final Result   No acute fracture or dislocation. **This report has been created using voice recognition software. It may contain minor errors which are inherent in voice recognition technology. **      Final report electronically signed by Dr. Magda Diego on 8/14/2020 1:58 PM        URGENT CARE COURSE:     Vitals:    08/14/20 1323 08/14/20 1324   BP: 119/75    Pulse: 93    Resp: 16    Temp: 98.1 °F (36.7 °C)    TempSrc: Temporal    SpO2: 95%    Weight:  210 lb (95.3 kg)

## 2020-09-10 ENCOUNTER — TELEPHONE (OUTPATIENT)
Dept: CARDIOLOGY CLINIC | Age: 45
End: 2020-09-10

## 2020-09-29 ENCOUNTER — PROCEDURE VISIT (OUTPATIENT)
Dept: CARDIOLOGY CLINIC | Age: 45
End: 2020-09-29
Payer: MEDICARE

## 2020-09-29 PROCEDURE — 93296 REM INTERROG EVL PM/IDS: CPT | Performed by: INTERNAL MEDICINE

## 2020-09-29 PROCEDURE — 93294 REM INTERROG EVL PM/LDLS PM: CPT | Performed by: INTERNAL MEDICINE

## 2020-09-29 NOTE — PROGRESS NOTES
[facility]     9 years [facility] device   At imped 415  P waves 2.8  13.2% paced     Atrial tach vs SVT

## 2020-10-20 RX ORDER — SODIUM CHLORIDE 450 MG/100ML
INJECTION, SOLUTION INTRAVENOUS CONTINUOUS
Status: CANCELLED | OUTPATIENT
Start: 2020-10-20

## 2020-12-10 ENCOUNTER — OFFICE VISIT (OUTPATIENT)
Dept: CARDIOLOGY CLINIC | Age: 45
End: 2020-12-10
Payer: MEDICARE

## 2020-12-10 VITALS
BODY MASS INDEX: 34.66 KG/M2 | WEIGHT: 208 LBS | HEIGHT: 65 IN | SYSTOLIC BLOOD PRESSURE: 136 MMHG | HEART RATE: 79 BPM | DIASTOLIC BLOOD PRESSURE: 82 MMHG

## 2020-12-10 PROCEDURE — G8417 CALC BMI ABV UP PARAM F/U: HCPCS | Performed by: INTERNAL MEDICINE

## 2020-12-10 PROCEDURE — 99213 OFFICE O/P EST LOW 20 MIN: CPT | Performed by: INTERNAL MEDICINE

## 2020-12-10 PROCEDURE — G8427 DOCREV CUR MEDS BY ELIG CLIN: HCPCS | Performed by: INTERNAL MEDICINE

## 2020-12-10 PROCEDURE — 93000 ELECTROCARDIOGRAM COMPLETE: CPT | Performed by: INTERNAL MEDICINE

## 2020-12-10 PROCEDURE — 4004F PT TOBACCO SCREEN RCVD TLK: CPT | Performed by: INTERNAL MEDICINE

## 2020-12-10 PROCEDURE — G8484 FLU IMMUNIZE NO ADMIN: HCPCS | Performed by: INTERNAL MEDICINE

## 2020-12-10 RX ORDER — BENZTROPINE MESYLATE 0.5 MG/1
TABLET ORAL
COMMUNITY
Start: 2020-10-28 | End: 2022-10-07

## 2020-12-10 RX ORDER — GABAPENTIN 800 MG/1
800 TABLET ORAL 3 TIMES DAILY
COMMUNITY
Start: 2020-10-09

## 2020-12-10 RX ORDER — GLIPIZIDE 5 MG/1
TABLET, FILM COATED, EXTENDED RELEASE ORAL
COMMUNITY
Start: 2020-10-09

## 2020-12-10 NOTE — PROGRESS NOTES
[facility] [address]  [facility] [address]  Dept: [phone]  Dept Fax: [phone]  Loc: [phone]    Visit Date: 12/10/2020    Ms. Christina Hogan is a 39 y.o. 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 Had PPM placed on 5/30/18 by Dr. Hannah Roy. Denies any chest pain, sob, palpitations, lightheadedness, dizziness, orthopnea, PND or pedal edema. Current Outpatient Medications:     benztropine (COGENTIN) 0.5 MG tablet, , Disp: , Rfl:     glipiZIDE (GLUCOTROL XL) 5 MG extended release tablet, , Disp: , Rfl:     gabapentin (NEURONTIN) 800 MG tablet, , Disp: , Rfl:     OXcarbazepine (TRILEPTAL) 300 MG tablet, Take 300 mg by mouth 2 times daily, Disp: , Rfl:     Cariprazine HCl (VRAYLAR PO), Take by mouth, Disp: , Rfl:     fluticasone (FLONASE) 50 MCG/ACT nasal spray, 2 sprays by Nasal route daily, Disp: 1 Bottle, Rfl: 0    DULoxetine (CYMBALTA) 30 MG extended release capsule, Take 1 capsule by mouth 2 times daily, Disp: 60 capsule, Rfl: 0    levothyroxine (SYNTHROID) 150 MCG tablet, Take 1 tablet by mouth daily, Disp: 30 tablet, Rfl: 5    TRUE METRIX BLOOD GLUCOSE TEST strip, 1 each by In Vitro route daily As needed. , Disp: 100 each, Rfl: 3    meclizine (ANTIVERT) 25 MG tablet, Use  In am and  At night  And  Every  6 hours  As needed   dizziness, Disp: 60 tablet, Rfl: 1    acetaminophen (TYLENOL) 500 MG tablet, Take 1 tablet by mouth 4 times daily as needed for Pain, Disp: 120 tablet, Rfl: 0    glucose blood VI test strips (TRUE METRIX BLOOD GLUCOSE TEST) strip, Use to test blood glucose levels BID.  Diagnosis: E11.65, Disp: 100 each, Rfl: 5    potassium chloride (KLOR-CON M) 20 MEQ extended release tablet, take 1 tablet by mouth once daily (Patient taking differently: take 1 tablet by mouth once daily prn), Disp: 30 tablet, Rfl: 7    albuterol sulfate HFA (PROAIR HFA) 108 (90 BASE) MCG/ACT inhaler, Inhale 2 puffs into the lungs every 4 hours as needed for Wheezing, Disp: 1 Inhaler, Rfl: 1    Lancets MISC, Check blood sugar 4 x daily (Dx: E11.65), Disp: 150 each, Rfl: 3    indomethacin (INDOCIN) 25 MG capsule, Take 2 capsules by mouth 3 times daily (with meals) for 10 days, Disp: 60 capsule, Rfl: 0    gabapentin (NEURONTIN) 300 MG capsule, Take 1 capsule by mouth nightly for 1 day, THEN 1 capsule 2 times daily for 1 day, THEN 1 capsule 3 times daily for 1 day., Disp: 30 capsule, Rfl: 0    bumetanide (BUMEX) 1 MG tablet, Take 1 mg by mouth daily as needed , Disp: , Rfl: 0    Past Medical History  Hope  has a past medical history of Anxiety, Arrhythmia, Arthritis, Atypical chest pain, Bradycardia, CAD (coronary artery disease), Cancer (HCC), Chest pain, CHF (congestive heart failure) ([address] Cholelithiasis, COPD (chronic obstructive pulmonary disease) ([address] Cystocele, midline, Depression, Diabetes mellitus ([address] DJD (degenerative joint disease), Environmental allergies, Fatty liver, Female stress incontinence, Fibromyalgia, Herpes, History of alcohol abuse, History of MRSA infection, History of seizure disorder, History of syncope, Hx of blood clots, Hyperlipidemia, Hypotension, Hypothyroidism, MDRO (multiple drug resistant organisms) resistance, Medtronic dual pacer , medtronic loop recorder, Medtronic single pacer /AAI paced , Metabolic syndrome, Metabolic syndrome, Migraine headache, Mild pulmonary hypertension ([address] Obesity, Class II, BMI 35.0-39.9, with comorbidity (see actual BMI), PAC (premature atrial contraction), Palpitations, Personal history of methicillin resistant Staphylococcus aureus, PVC's, S/P ablation operation for arrhythmia, Scoliosis, Seizures ([address] Sinus bradycardia, Superficial phlebitis, Tachycardia, Type II or unspecified type diabetes mellitus without mention of complication, not stated as uncontrolled, and Unstable angina ([address] Social History  Snow  reports that she has been smoking cigarettes. She has a 10.00 pack-year smoking history. She has never used smokeless tobacco. She reports current drug use. Drug: Marijuana. She reports that she does not drink alcohol. Family History  Snow family history includes COPD in her mother; Diabetes in her mother; Heart Disease in her mother. Past Surgical History   Past Surgical History:   Procedure Laterality Date    ABDOMEN SURGERY      ABLATION OF DYSRHYTHMIC FOCUS  2010   [address] SURGERY      BACK SURGERY  2010    cyst removed    BLADDER REPAIR      CARDIAC CATHETERIZATION      CARDIAC SURGERY  5 -8 -12    Reveal medtronic loop recorder insert    CARDIOVASCULAR STRESS TEST  3 30 2011    The gated SPECT demonstrated normal LV function, demonstrated normal thickening, normal contractility, normal motion, EF 53%. No evidence of stress-induced ischemia is noted on this study. There is evidence of bowel interference with the inferior counts and also perfusion defect seen in the anterior apical wall both at rest and stress.  CARDIOVASCULAR STRESS TEST  9 15 2003    There is no scintigraphic evidence of inducible myocardial ischemia or infarction. LV systolic function is normal. Exercise capacity is mildly reduced.  CYST REMOVAL      L5-S1 facet cyst excision resulting in postoperative complications including hematoma and infection requiring I&D.      DILATION AND CURETTAGE OF UTERUS  1995    HYSTERECTOMY, TOTAL ABDOMINAL  1-12-16    LAPAROSCOPY  01/02/2013    Diagnostic Laparoscopy, D & C Novasure, Hysteroscopy, Endometrial Ablation, AP Repair    OTHER SURGICAL HISTORY  04/27/2017    [facility]  [facility]/30/2018    Medtronic    THYROIDECTOMY  3094    Follicular thyroid cancer, S/P total thyroidectomy in 2005, radioactive iodine ablation in 2007.  TRANSTHORACIC ECHOCARDIOGRAM  3 30 2011    Size was normal. systolic function was normal. EF was estimated in the range of 55-65%. There were no regional wall motion abnormalities. Wall thickness was normal. Doppler parameters were consistent with abnormal LV relaxation (grade 1 diastolic dysfunction.)     TUBAL LIGATION  1999       Subjective:     REVIEW OF SYSTEMS  Constitutional: denies sweats, chills and fever  HENT: denies  congestion, sinus pressure, sneezing and sore throat. Eyes: denies  pain, discharge, redness and itching. Respiratory: denies apnea, cough  Gastrointestinal: denies blood in stool, constipation, diarrhea   Endocrine: denies cold intolerance, heat intolerance, polydipsia. Genitourinary: denies dysuria, enuresis, flank pain and hematuria. Musculoskeletal: denies arthralgias, joint swelling and neck pain. Neurological: denies numbness and headaches. Psychiatric/Behavioral: denies agitation, confusion, decreased concentration and dysphoric mood    All others reviewed and are negative. Objective:     /82   Pulse 79   Ht 5' 5\" (1.651 m)   Wt 208 lb (94.3 kg)   LMP 12/19/2014   BMI 34.61 kg/m²     Wt Readings from Last 3 Encounters:   12/10/20 208 lb (94.3 kg)   08/14/20 210 lb (95.3 kg)   02/19/20 219 lb (99.3 kg)     BP Readings from Last 3 Encounters:   12/10/20 136/82   08/14/20 119/75   02/19/20 118/85       PHYSICAL EXAM  Constitutional: Oriented to person, place, and time. Appears well-developed and well-nourished. HENT:   Head: Normocephalic and atraumatic. Eyes: EOM are normal. Pupils are equal, round, and reactive to light. Neck: Normal range of motion. Neck supple. No JVD present. Cardiovascular: Normal rate , normal heart sounds and intact distal pulses. Pulmonary/Chest: Effort normal and breath sounds normal. No respiratory distress. No wheezes. No rales. Abdominal: Soft. Bowel sounds are normal. No distension. There is no tenderness. Musculoskeletal: Normal range of motion. No edema. Neurological: Alert and oriented to person, place, and time. No cranial nerve deficit. Coordination normal.   Skin: Skin is warm and dry. Psychiatric: Normal mood and affect.        Lab Results   Component Value Date    CKTOTAL 219 07/03/2017    CKTOTAL 91 04/08/2017    CKTOTAL 64 09/04/2016    CKMB 3.8 07/03/2017    CKMBINDEX 1.7 07/03/2017       Lab Results   Component Value Date    WBC 9.6 02/26/2020    RBC 4.22 02/26/2020    RBC 4.04 04/30/2012    HGB 13.7 02/26/2020    HCT 40.0 02/26/2020    MCV 94.8 02/26/2020    MCH 32.5 02/26/2020    MCHC 34.2 02/26/2020    RDW 13.8 02/26/2020     02/26/2020    MPV 10.2 02/26/2020       Lab Results   Component Value Date     02/26/2020    K 3.2 02/26/2020    K 3.6 05/27/2018     02/26/2020    CO2 30 02/26/2020    BUN 12 02/26/2020    LABALBU 4.3 02/26/2020    LABALBU 4.1 03/20/2012    CREATININE 0.84 02/26/2020    CALCIUM 9.70 02/26/2020    GFRAA >60 02/26/2020    LABGLOM >60 02/26/2020    LABGLOM >90 01/22/2019    GLUCOSE 102 02/26/2020       Lab Results   Component Value Date    ALKPHOS 52 02/26/2020    ALT 13 02/26/2020    AST 21 02/26/2020    PROT 7.3 02/26/2020    BILITOT 0.5 02/26/2020    BILIDIR 0.1 02/26/2020    LABALBU 4.3 02/26/2020    LABALBU 4.1 03/20/2012       Lab Results   Component Value Date    MG 1.7 02/26/2020       Lab Results   Component Value Date    INR 1.07 01/22/2019    INR 1.09 05/26/2018    INR 1.06 09/04/2016    PROTIME 12.2 09/04/2016    PROTIME 11.1 12/06/2015         Lab Results   Component Value Date    LABA1C 8.2 05/30/2019    LABA1C 8.9 02/27/2019       Lab Results   Component Value Date    TRIG 172 02/26/2020    HDL 52 02/26/2020    LDLCALC 69 10/09/2018       Lab Results   Component Value Date    TSH 44.09 06/23/2020         Testing Reviewed:      I haveindividually reviewed the below cardiac tests    EKG:    ECHO:   Results for orders placed during the hospital encounter of 11/14/19   ECHO Complete 2D W Doppler W Color    Narrative Transthoracic Echocardiography Report (TTE)     Demographics      Patient Name    Halima Grubbs Gender                Female      MR #            [id number]      Race                                                       Ethnicity      Account #       [de-identified]      Room Number      Accession       [id number]      Date of Study         11/14/2019   Number      Date of Birth   1975     Referring Physician   Jose Stephens, PAVAN      Age             40 year(s)     Shira Dickerson, [facility]                                     Interpreting          Handy Ngo MD                                  Physician     Procedure    Type of Study      TTE procedure:ECHOCARDIOGRAM COMPLETE 2D W DOPPLER W COLOR. Procedure Date  Date: 11/14/2019 Start: 10:21 AM    Study Location: Echo Lab  Technical Quality: Adequate visualization    Indications:Shortness of breath and Chest pain. Additional Medical History:Smoker, Diabetic, Hypertension, hyperlipidemia,  Bradycardia, Hypothyroidism, Angina, Thyroid cancer, PVC's, SSS, SVT, Pacer. Patient Status: Routine    Height: 65 inches Weight: 207 pounds BSA: 2.01 m^2 BMI: 34.45 kg/m^2    BP: 112/62 mmHg    Allergies    - See Epic. Conclusions      Summary   Left ventricular size and systolic function is normal. Ejection fraction   was estimated at 55-60%. LV wall thickness is within normal limits and   there are no obvious wall motion abnormalities. Mild tricuspid regurgitation. Mild mitral regurgitation is present.       Signature      ----------------------------------------------------------------   Electronically signed by Handy Ngo MD (Interpreting   physician) on 11/14/2019 at 01:03 PM ----------------------------------------------------------------      Findings      Mitral Valve   The mitral valve structure is normal with normal leaflet separation. DOPPLER: The transmitral velocity was within the normal range with no   evidence for mitral stenosis. Mild mitral regurgitation is present. Aortic Valve   The aortic valve appears trileaflet with normal thickness and leaflet   excursion. DOPPLER: Transaortic velocity was within the normal range with   no evidence of aortic stenosis. There was no evidence of aortic   regurgitation. Tricuspid Valve   The tricuspid valve structure is normal with normal leaflet separation. DOPPLER: There is no evidence of tricuspid stenosis. Mild tricuspid regurgitation. Pulmonic Valve   The pulmonic valve leaflets appear normal thickness, and normal cuspal   separation. DOPPLER: The transpulmonic velocity was within the normal   range. No evidence for regurgitation. Left Atrium   Left atrial size is normal.      Left Ventricle   Left ventricular size and systolic function is normal. Ejection fraction   was estimated at 55-60%. LV wall thickness is within normal limits and   there are no obvious wall motion abnormalities. Right Atrium   Right atrial size was normal.      Right Ventricle   The right ventricular size appears normal with normal systolic function   and wall thickness. Pericardial Effusion   The pericardium appears normal with no evidence of a pericardial effusion. Pleural Effusion   No evidence of pleural effusion. Aorta / Great Vessels   -Aortic root dimension within normal limits. -IVC size is within normal limits with normal respiratory phasic changes.      M-Mode/2D Measurements & Calculations      LV Diastolic   LV Systolic Dimension:    AV Cusp Separation: 2.3 cmLA   Dimension: 4.9 3.5 cm                    Dimension: 3.7 cmAO Root   cm             LV Volume Diastolic: 711  Dimension: 3.3 cmLA Area: 21.3   LV FS:28.6 %   ml                        cm^2   LV PW          LV Volume Systolic: 62.5   Diastolic: 0.9 ml   cm             LV EDV/LV EDV Index: 113   Septum         ml/56 m^2LV ESV/LV ESV    RV Diastolic Dimension: 3.6 cm   Diastolic: 0.9 Index: 85.5 ml/25 m^2   cm             EF Calculated: 55 %       LA/Aorta: 1.12                                            Ascending Aorta: 3.6 cm                                            LA volume/Index: 62.9 ml /31m^2     Doppler Measurements & Calculations      MV Peak E-Wave: 60.1 cm/s AV Peak Velocity: 109  LVOT Peak Velocity: 71.3   MV Peak A-Wave: 50.4 cm/s cm/s                   cm/s   MV E/A Ratio: 1.19        AV Peak Gradient: 4.75 LVOT Peak Gradient: 2   MV Peak Gradient: 1.44    mmHg                   mmHg   mmHg                                                    TV Peak E-Wave: 63.5 cm/s   MV Deceleration Time: 401                        TV Peak A-Wave: 51.4 cm/s   msec                             IVRT: 63 msec          TV Peak Gradient: 1.61                                                    mmHg   MV E' Septal Velocity:                           TR Velocity:183 cm/s   6.6 cm/s                  AV DVI (Vmax):0.65     TR Gradient:13.4 mmHg   MV A' Septal Velocity:                           PV Peak Velocity: 53.4   8.8 cm/s                                         cm/s   MV E' Lateral Velocity:                          PV Peak Gradient: 1.14   8.3 cm/s                                         mmHg   MV A' Lateral Velocity:   6.2 cm/s   E/E' septal: 9.11                                IL ED Velocity: 109 cm/s   E/E' lateral: 7.24   MR Velocity: 341 cm/s     [id number]       STRESS:    CATH:    Assessment/Plan       Diagnosis Orders   1. Pure hypercholesterolemia  EKG 12 Lead   2. Mild pulmonary hypertension (HCC)  EKG 12 Lead   3. Bradycardia  EKG 12 Lead   4. Unstable angina (HCC)  EKG 12 Lead       s/p PPM  On 5/30/18 (Single atrial PPM)  Hx AVNRT ablation  DM  Hypotension  COPD  Smoker (1ppd)     Reviewed prior workup with patient  Device interrogated and no events  Patient is diabetic   Reviewed recent Echo, stress and BRIDGETTE  Advised to stop smoking because smoking increases risk of heart disease, morbidity, mortality and end organ damage. The patient is asked to make an attempt to improve diet and exercise patterns to aid in medical management of this problem. Advised patient to call office or seek immediate medical attention if there is any new onset of  any chest pain, sob, palpitations, lightheadedness, dizziness, orthopnea, PND or pedal edema. Advised to stop smoking because smoking increases risk of heart disease, morbidity, mortality and end organ damage.     Thank youfor allowing me to participate in the care of this patient. Please do not hesitate to contact me for any further questions. Return in about 1 year (around 12/10/2021), or if symptoms worsen or fail to improve, for Regular follow up, Review testing.        Electronically signed by Katie Robbins MD [facility]  12/10/2020 at 2:58 PM

## 2021-01-05 ENCOUNTER — TELEPHONE (OUTPATIENT)
Dept: CARDIOLOGY CLINIC | Age: 46
End: 2021-01-05

## 2021-01-06 ENCOUNTER — PROCEDURE VISIT (OUTPATIENT)
Dept: CARDIOLOGY CLINIC | Age: 46
End: 2021-01-06
Payer: MEDICARE

## 2021-01-06 DIAGNOSIS — Z95.0 PACEMAKER: Primary | ICD-10-CM

## 2021-01-06 PROCEDURE — 93294 REM INTERROG EVL PM/LDLS PM: CPT | Performed by: INTERNAL MEDICINE

## 2021-01-06 PROCEDURE — 93296 REM INTERROG EVL PM/IDS: CPT | Performed by: INTERNAL MEDICINE

## 2021-01-06 NOTE — PROGRESS NOTES
carelink medtronic single pacer AAI     8.5 years on device   P waves 1.4-2.8  imped 391  12.2% paced   Atrial high rate

## 2021-03-01 ENCOUNTER — TELEPHONE (OUTPATIENT)
Dept: CARDIOLOGY CLINIC | Age: 46
End: 2021-03-01

## 2021-03-01 NOTE — LETTER
.. [address] [place] [address] [address] [place] [address]  Phone: [phone]  Fax: [phone]        March 1, 2021    [id number]      Dear [facility]: We have not been able to contact you by phone or to leave a message (voicemail is full). Please call us at [phone] to reschedule your missed pacemaker appointment. If you have any questions or concerns, please don't hesitate to call.     Sincerely,        [address] [place] [address],[facility]

## 2021-03-26 ENCOUNTER — HOSPITAL ENCOUNTER (OUTPATIENT)
Age: 46
Setting detail: SPECIMEN
Discharge: HOME | End: 2021-03-26
Payer: MEDICARE

## 2021-03-26 LAB — TSH SERPL DL<=0.05 MIU/L-ACNC: <0.01 MIU/L (ref 0.3–5)

## 2021-03-27 LAB — THYROXINE, FREE: 1.4 NG/DL (ref 0.93–1.7)

## 2021-04-06 ENCOUNTER — NURSE ONLY (OUTPATIENT)
Dept: CARDIOLOGY CLINIC | Age: 46
End: 2021-04-06
Payer: MEDICARE

## 2021-04-06 DIAGNOSIS — Z95.0 PACEMAKER: Primary | ICD-10-CM

## 2021-04-06 NOTE — PROGRESS NOTES
medtronic single AAI pacer     8 years on device   P waves 1.4-2  Threshold 0.5 @ 0.4  At imped 387  10.4% paced

## 2021-04-11 PROCEDURE — 93279 PRGRMG DEV EVAL PM/LDLS PM: CPT | Performed by: INTERNAL MEDICINE

## 2021-05-24 ENCOUNTER — PROCEDURE VISIT (OUTPATIENT)
Dept: CARDIOLOGY CLINIC | Age: 46
End: 2021-05-24
Payer: MEDICARE

## 2021-05-24 DIAGNOSIS — Z95.0 PACEMAKER: Primary | ICD-10-CM

## 2021-07-15 PROCEDURE — 93296 REM INTERROG EVL PM/IDS: CPT | Performed by: INTERNAL MEDICINE

## 2021-07-15 PROCEDURE — 93294 REM INTERROG EVL PM/LDLS PM: CPT | Performed by: INTERNAL MEDICINE

## 2021-08-30 ENCOUNTER — PROCEDURE VISIT (OUTPATIENT)
Dept: CARDIOLOGY CLINIC | Age: 46
End: 2021-08-30

## 2021-08-30 DIAGNOSIS — Z95.0 PACEMAKER: Primary | ICD-10-CM

## 2021-08-30 PROCEDURE — 93296 REM INTERROG EVL PM/IDS: CPT | Performed by: INTERNAL MEDICINE

## 2021-08-30 PROCEDURE — 93294 REM INTERROG EVL PM/LDLS PM: CPT | Performed by: INTERNAL MEDICINE

## 2021-08-30 NOTE — PROGRESS NOTES
[employer] single pacemaker --  Patient of Nallu    Battery 5.5-8.5 years    Presenting rhythm AP    A impedance 409    A sense 1.4-2.8    A paced 13.5%    A Thresholds -@-  A Amplitude 2.250 @ 0.40    Episodes   5/1/21 -- high atrial rate 3 min 58 seconds - max rate 167 -- only channel markers

## 2021-12-07 ENCOUNTER — TELEPHONE (OUTPATIENT)
Dept: CARDIOLOGY CLINIC | Age: 46
End: 2021-12-07

## 2021-12-13 ENCOUNTER — PROCEDURE VISIT (OUTPATIENT)
Dept: CARDIOLOGY CLINIC | Age: 46
End: 2021-12-13
Payer: MEDICARE

## 2021-12-13 ENCOUNTER — PATIENT MESSAGE (OUTPATIENT)
Dept: CARDIOLOGY CLINIC | Age: 46
End: 2021-12-13

## 2021-12-13 DIAGNOSIS — Z95.0 PACEMAKER: Primary | ICD-10-CM

## 2021-12-13 NOTE — PROGRESS NOTES
[employer]onic single pacemaker --   Patient of Nallu    Battery 5-8 years    Presenting rhythm AP    A impedance 374    A sense 1.4-2.8    A paced 14.4%    A Thresholds -@-  A Amplitude 2.25 @ 0.40    Episodes   High atrial rates - only channel markers.

## 2021-12-13 NOTE — LETTER
[address] [place] [address]  Phone: [phone]  Fax: [phone]          December 20, 2021    [address] [place] [address]  Ranjit Crabtree       Dear Naveed Sheikh: We receive your Carelink/Latitude/Merlin on December 13, 2021. It is on your physician's desk to read. Your next scheduled transmission from home is 3/14/22.     Your yearly pacemaker/defibrillator in office check is 4/19/22 at [facility] have 5-8 years left on your battery   You pace the top part of your heart (atrium) 14.4%        IF  YOU HAVE QUESTIONS REGARDING YOUR HOME MONITOR PLEASE CALL:     Medtronic Carelink:  [phone]        Thank You!   [address] [facility] [facility]

## 2021-12-14 PROCEDURE — 93296 REM INTERROG EVL PM/IDS: CPT | Performed by: INTERNAL MEDICINE

## 2021-12-14 PROCEDURE — 93294 REM INTERROG EVL PM/LDLS PM: CPT | Performed by: INTERNAL MEDICINE

## 2021-12-20 ENCOUNTER — OFFICE VISIT (OUTPATIENT)
Dept: CARDIOLOGY CLINIC | Age: 46
End: 2021-12-20
Payer: MEDICARE

## 2021-12-20 VITALS
WEIGHT: 214.6 LBS | DIASTOLIC BLOOD PRESSURE: 87 MMHG | HEIGHT: 65 IN | SYSTOLIC BLOOD PRESSURE: 122 MMHG | BODY MASS INDEX: 35.75 KG/M2 | HEART RATE: 70 BPM

## 2021-12-20 DIAGNOSIS — I47.1 SVT (SUPRAVENTRICULAR TACHYCARDIA) (HCC): ICD-10-CM

## 2021-12-20 DIAGNOSIS — I49.3 PVCS (PREMATURE VENTRICULAR CONTRACTIONS): Primary | ICD-10-CM

## 2021-12-20 PROCEDURE — 4004F PT TOBACCO SCREEN RCVD TLK: CPT | Performed by: INTERNAL MEDICINE

## 2021-12-20 PROCEDURE — G8484 FLU IMMUNIZE NO ADMIN: HCPCS | Performed by: INTERNAL MEDICINE

## 2021-12-20 PROCEDURE — 99213 OFFICE O/P EST LOW 20 MIN: CPT | Performed by: INTERNAL MEDICINE

## 2021-12-20 PROCEDURE — G8417 CALC BMI ABV UP PARAM F/U: HCPCS | Performed by: INTERNAL MEDICINE

## 2021-12-20 PROCEDURE — 93000 ELECTROCARDIOGRAM COMPLETE: CPT | Performed by: INTERNAL MEDICINE

## 2021-12-20 PROCEDURE — G8427 DOCREV CUR MEDS BY ELIG CLIN: HCPCS | Performed by: INTERNAL MEDICINE

## 2021-12-20 RX ORDER — POLYETHYLENE GLYCOL 3350 17 G/17G
POWDER, FOR SOLUTION ORAL
COMMUNITY
Start: 2020-03-11

## 2021-12-20 RX ORDER — TRAZODONE HYDROCHLORIDE 50 MG/1
TABLET ORAL
COMMUNITY
Start: 2020-10-09 | End: 2022-10-07

## 2021-12-20 NOTE — PROGRESS NOTES
[address] [place] [address],[place] [address] [address]  [place] [address]  Dept: [phone]  Dept Fax: [phone]  Loc: [phone]    Visit Date: 12/20/2021    Ms. Ryan Cavanaugh is a 55 y.o. 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 Had PPM placed on 5/30/18 by Dr. Kian Michaud. Denies any chest pain, sob, palpitations, lightheadedness, dizziness, orthopnea, PND or pedal edema. Current Outpatient Medications:     polyethylene glycol (MIRALAX) 17 GM/SCOOP powder, POLYETHYLENE GLYCOL 3350 MiraLax Oral Powder 03/11/2020 Provider: [facility] 03- [facility] 87 ([id number]), Disp: , Rfl:     traZODone (DESYREL) 50 MG tablet, traZODone traZODone HCl 50 MG Oral Tablet 10/09/2020 Provider: 10-  [facility] 87 ([id number]), Disp: , Rfl:     gabapentin (NEURONTIN) 800 MG tablet, Take 800 mg by mouth 3 times daily.  , Disp: , Rfl:     fluticasone (FLONASE) 50 MCG/ACT nasal spray, 2 sprays by Nasal route daily, Disp: 1 Bottle, Rfl: 0    levothyroxine (SYNTHROID) 150 MCG tablet, Take 1 tablet by mouth daily (Patient taking differently: Take 200 mcg by mouth daily ), Disp: 30 tablet, Rfl: 5    acetaminophen (TYLENOL) 500 MG tablet, Take 1 tablet by mouth 4 times daily as needed for Pain, Disp: 120 tablet, Rfl: 0    potassium chloride (KLOR-CON M) 20 MEQ extended release tablet, take 1 tablet by mouth once daily (Patient taking differently: take 1 tablet by mouth once daily prn), Disp: 30 tablet, Rfl: 7    bumetanide (BUMEX) 1 MG tablet, Take 1 mg by mouth daily as needed , Disp: , Rfl: 0    albuterol sulfate HFA (PROAIR HFA) 108 (90 BASE) MCG/ACT inhaler, Inhale 2 puffs into the lungs every 4 hours as needed for Wheezing, Disp: 1 Inhaler, Rfl: 1    benztropine (COGENTIN) 0.5 MG tablet, , Disp: , Rfl:     glipiZIDE (GLUCOTROL XL) 5 MG extended release tablet, , Disp: , Rfl:     OXcarbazepine (TRILEPTAL) 300 MG tablet, Take 300 mg by mouth 2 times daily (Patient not taking: Reported on 12/20/2021), Disp: , Rfl:     Cariprazine HCl (VRAYLAR PO), Take by mouth (Patient not taking: Reported on 12/20/2021), Disp: , Rfl:     indomethacin (INDOCIN) 25 MG capsule, Take 2 capsules by mouth 3 times daily (with meals) for 10 days (Patient not taking: Reported on 12/20/2021), Disp: 60 capsule, Rfl: 0    DULoxetine (CYMBALTA) 30 MG extended release capsule, Take 1 capsule by mouth 2 times daily (Patient not taking: Reported on 12/20/2021), Disp: 60 capsule, Rfl: 0    meclizine (ANTIVERT) 25 MG tablet, Use  In am and  At night  And  Every  6 hours  As needed   dizziness (Patient not taking: Reported on 12/20/2021), Disp: 60 tablet, Rfl: 1    Past Medical History  Hope  has a past medical history of Anxiety, Arrhythmia, Arthritis, Atypical chest pain, Bradycardia, CAD (coronary artery disease), Cancer ([address] Chest pain, CHF (congestive heart failure) ([address] Cholelithiasis, COPD (chronic obstructive pulmonary disease) ([address] Cystocele, midline, Depression, Diabetes mellitus ([address] DJD (degenerative joint disease), Environmental allergies, Fatty liver, Female stress incontinence, Fibromyalgia, Herpes, History of alcohol abuse, History of MRSA infection, History of seizure disorder, History of syncope, Hx of blood clots, Hyperlipidemia, Hypotension, Hypothyroidism, MDRO (multiple drug resistant organisms) resistance, Medtronic dual pacer , medtronic loop recorder, Medtronic single pacer /AAI paced , Metabolic syndrome, Metabolic syndrome, Migraine headache, Mild pulmonary hypertension ([address] Obesity, Class II, BMI 35.0-39.9, with comorbidity (see actual BMI), PAC (premature atrial contraction), Palpitations, Personal history of methicillin resistant Staphylococcus aureus, PVC's, S/P ablation operation for arrhythmia, Scoliosis, Seizures ([address] Sinus bradycardia, Superficial phlebitis, Tachycardia, Type II or unspecified type diabetes mellitus without mention of complication, not stated as uncontrolled, and Unstable angina ([address] Social History  [place]  reports that she has been smoking cigarettes. She has a 10.00 pack-year smoking history. She has never used smokeless tobacco. She reports current drug use. Drug: Marijuana [place]). She reports that she does not drink alcohol. Family History  [place] family history includes COPD in her mother; Diabetes in her mother; Heart Disease in her mother. Past Surgical History   Past Surgical History:   Procedure Laterality Date    ABDOMEN SURGERY      ABLATION OF DYSRHYTHMIC FOCUS  2010   [address] SURGERY      BACK SURGERY  2010    cyst removed    BLADDER REPAIR      CARDIAC CATHETERIZATION      CARDIAC SURGERY  5 -8 -12    Reveal medtronic loop recorder insert    CARDIOVASCULAR STRESS TEST  3 30 2011    The gated SPECT demonstrated normal LV function, demonstrated normal thickening, normal contractility, normal motion, EF 53%. No evidence of stress-induced ischemia is noted on this study. There is evidence of bowel interference with the inferior counts and also perfusion defect seen in the anterior apical wall both at rest and stress.  CARDIOVASCULAR STRESS TEST  9 15 2003    There is no scintigraphic evidence of inducible myocardial ischemia or infarction. LV systolic function is normal. Exercise capacity is mildly reduced.  CYST REMOVAL      L5-S1 facet cyst excision resulting in postoperative complications including hematoma and infection requiring I&D.      DILATION AND CURETTAGE OF UTERUS  1995    HYSTERECTOMY, TOTAL ABDOMINAL  1-12-16    LAPAROSCOPY  01/02/2013    Diagnostic Laparoscopy, D & C Novasure, Hysteroscopy, Endometrial Ablation, AP Repair    OTHER SURGICAL HISTORY  04/27/2017    [facility]  [facility]    Medtronic    THYROIDECTOMY  4276    Follicular thyroid cancer, S/P total thyroidectomy in 2005, radioactive iodine ablation in 2007.  TRANSTHORACIC ECHOCARDIOGRAM  3 30 2011    Size was normal. systolic function was normal. EF was estimated in the range of 55-65%. There were no regional wall motion abnormalities. Wall thickness was normal. Doppler parameters were consistent with abnormal LV relaxation (grade 1 diastolic dysfunction.)     TUBAL LIGATION  1999       Subjective:     REVIEW OF SYSTEMS  Constitutional: denies sweats, chills and fever  HENT: denies  congestion, sinus pressure, sneezing and sore throat. Eyes: denies  pain, discharge, redness and itching. Respiratory: denies apnea, cough  Gastrointestinal: denies blood in stool, constipation, diarrhea   Endocrine: denies cold intolerance, heat intolerance, polydipsia. Genitourinary: denies dysuria, enuresis, flank pain and hematuria. Musculoskeletal: denies arthralgias, joint swelling and neck pain. Neurological: denies numbness and headaches. Psychiatric/Behavioral: denies agitation, confusion, decreased concentration and dysphoric mood    All others reviewed and are negative. Objective:     /87   Pulse 70   Ht 5' 5\" (1.651 m)   Wt 214 lb 9.6 oz (97.3 kg)   LMP 12/19/2014   BMI 35.71 kg/m²     Wt Readings from Last 3 Encounters:   12/20/21 214 lb 9.6 oz (97.3 kg)   12/10/20 208 lb (94.3 kg)   08/14/20 210 lb (95.3 kg)     BP Readings from Last 3 Encounters:   12/20/21 122/87   12/10/20 136/82   08/14/20 119/75       PHYSICAL EXAM  Constitutional: Oriented to person, place, and time. Appears well-developed and well-nourished. HENT:   Head: Normocephalic and atraumatic. Eyes: EOM are normal. Pupils are equal, round, and reactive to light. Neck: Normal range of motion. Neck supple. No JVD present. 02/26/2020    HDL 52 02/26/2020    LDLCALC 69 10/09/2018       Lab Results   Component Value Date    TSH <0.01 03/26/2021         Testing Reviewed:      I haveindividually reviewed the below cardiac tests    EKG:    ECHO:   Results for orders placed during the hospital encounter of 11/14/19   ECHO Complete 2D W Doppler W Color    Narrative Transthoracic Echocardiography Report (TTE)     Demographics      Patient Name    [facility] Sarai Keller Gender                Female      MR #            [id number]      Race                                                       Ethnicity      Account #       [de-identified]      Room Number      Accession       [id number]      Date of Study         11/14/2019   Number      Date of Birth   1975     Referring Physician   Deloris Pina, PAVAN      Age             40 year(s)     Nabil Ledesma, CS                                     Interpreting          Eladio Chau MD                                  Physician     Procedure    Type of Study      TTE procedure:ECHOCARDIOGRAM COMPLETE 2D W DOPPLER W COLOR. Procedure Date  Date: 11/14/2019 Start: 10:21 AM    Study Location: Echo Lab  Technical Quality: Adequate visualization    Indications:Shortness of breath and Chest pain. Additional Medical History:Smoker, Diabetic, Hypertension, hyperlipidemia,  Bradycardia, Hypothyroidism, Angina, Thyroid cancer, PVC's, SSS, SVT, Pacer. Patient Status: Routine    Height: 65 inches Weight: 207 pounds BSA: 2.01 m^2 BMI: 34.45 kg/m^2    BP: 112/62 mmHg    Allergies    - See Epic. Conclusions      Summary   Left ventricular size and systolic function is normal. Ejection fraction   was estimated at 55-60%. LV wall thickness is within normal limits and   there are no obvious wall motion abnormalities. Mild tricuspid regurgitation. Mild mitral regurgitation is present.       Signature ----------------------------------------------------------------   Electronically signed by Roseanna Dickey MD (Interpreting   physician) on 11/14/2019 at 01:03 PM   ----------------------------------------------------------------      Findings      Mitral Valve   The mitral valve structure is normal with normal leaflet separation. DOPPLER: The transmitral velocity was within the normal range with no   evidence for mitral stenosis. Mild mitral regurgitation is present. Aortic Valve   The aortic valve appears trileaflet with normal thickness and leaflet   excursion. DOPPLER: Transaortic velocity was within the normal range with   no evidence of aortic stenosis. There was no evidence of aortic   regurgitation. Tricuspid Valve   The tricuspid valve structure is normal with normal leaflet separation. DOPPLER: There is no evidence of tricuspid stenosis. Mild tricuspid regurgitation. Pulmonic Valve   The pulmonic valve leaflets appear normal thickness, and normal cuspal   separation. DOPPLER: The transpulmonic velocity was within the normal   range. No evidence for regurgitation. Left Atrium   Left atrial size is normal.      Left Ventricle   Left ventricular size and systolic function is normal. Ejection fraction   was estimated at 55-60%. LV wall thickness is within normal limits and   there are no obvious wall motion abnormalities. Right Atrium   Right atrial size was normal.      Right Ventricle   The right ventricular size appears normal with normal systolic function   and wall thickness. Pericardial Effusion   The pericardium appears normal with no evidence of a pericardial effusion. Pleural Effusion   No evidence of pleural effusion. Aorta / Great Vessels   -Aortic root dimension within normal limits. -IVC size is within normal limits with normal respiratory phasic changes.      M-Mode/2D Measurements & Calculations      LV Diastolic   LV Systolic Dimension:    AV Cusp Separation: 2.3 cmLA   Dimension: 4.9 3.5 cm                    Dimension: 3.7 cmAO Root   cm             LV Volume Diastolic: 285  Dimension: 3.3 cmLA Area: 21.3   LV FS:28.6 %   ml                        cm^2   LV PW          LV Volume Systolic: 12.8   Diastolic: 0.9 ml   cm             LV EDV/LV EDV Index: 113   Septum         ml/56 m^2LV ESV/LV ESV    RV Diastolic Dimension: 3.6 cm   Diastolic: 0.9 Index: 17.6 ml/25 m^2   cm             EF Calculated: 55 %       LA/Aorta: 1.12                                            Ascending Aorta: 3.6 cm                                            LA volume/Index: 62.9 ml /31m^2     Doppler Measurements & Calculations      MV Peak E-Wave: 60.1 cm/s AV Peak Velocity: 109  LVOT Peak Velocity: 71.3   MV Peak A-Wave: 50.4 cm/s cm/s                   cm/s   MV E/A Ratio: 1.19        AV Peak Gradient: 4.75 LVOT Peak Gradient: 2   MV Peak Gradient: 1.44    mmHg                   mmHg   mmHg                                                    TV Peak E-Wave: 63.5 cm/s   MV Deceleration Time: 401                        TV Peak A-Wave: 51.4 cm/s   msec                             IVRT: 63 msec          TV Peak Gradient: 1.61                                                    mmHg   MV E' Septal Velocity:                           TR Velocity:183 cm/s   6.6 cm/s                  AV DVI (Vmax):0.65     TR Gradient:13.4 mmHg   MV A' Septal Velocity:                           PV Peak Velocity: 53.4   8.8 cm/s                                         cm/s   MV E' Lateral Velocity:                          PV Peak Gradient: 1.14   8.3 cm/s                                         mmHg   MV A' Lateral Velocity:   6.2 cm/s   E/E' septal: 9.11                                [place] ED Velocity: 109 cm/s   E/E' lateral: 7.24   MR Velocity: 341 cm/s     [email or web address] DocKey=[id number] STRESS:    CATH:    Assessment/Plan       Diagnosis Orders   1. PVCs (premature ventricular contractions)  EKG 12 lead   2. SVT (supraventricular tachycardia) (HCC)  EKG 12 lead       s/p PPM  On 5/30/18 (Single atrial PPM)  Hx AVNRT ablation  DM  Hypotension  COPD  Smoker (1ppd)     EKG today shows no significant findings  Reviewed prior workup with patient  Device interrogated and no events  Patient is diabetic   Reviewed recent Echo, stress and BRIDGETTE  Advised to stop smoking because smoking increases risk of heart disease, morbidity, mortality and end organ damage. The patient is asked to make an attempt to improve diet and exercise patterns to aid in medical management of this problem. Advised patient to call office or seek immediate medical attention if there is any new onset of  any chest pain, sob, palpitations, lightheadedness, dizziness, orthopnea, PND or pedal edema. Advised to stop smoking because smoking increases risk of heart disease, morbidity, mortality and end organ damage.     Thank youfor allowing me to participate in the care of this patient. Please do not hesitate to contact me for any further questions. Return in about 1 year (around 12/20/2022), or if symptoms worsen or fail to improve, for Regular follow up, Review testing.        Electronically signed by Perry Patel MD [facility]  12/20/2021 at 2:58 PM

## 2022-03-16 ENCOUNTER — TELEPHONE (OUTPATIENT)
Dept: CARDIOLOGY CLINIC | Age: 47
End: 2022-03-16

## 2022-03-16 NOTE — TELEPHONE ENCOUNTER
Missed download from device. Due 3/14/22  Called patient and asked to send download. Will send tonight.

## 2022-03-17 ENCOUNTER — PROCEDURE VISIT (OUTPATIENT)
Dept: CARDIOLOGY CLINIC | Age: 47
End: 2022-03-17
Payer: MEDICARE

## 2022-03-17 DIAGNOSIS — Z95.0 PACEMAKER: Primary | ICD-10-CM

## 2022-03-17 PROCEDURE — 93294 REM INTERROG EVL PM/LDLS PM: CPT | Performed by: INTERNAL MEDICINE

## 2022-03-17 PROCEDURE — 93296 REM INTERROG EVL PM/IDS: CPT | Performed by: INTERNAL MEDICINE

## 2022-03-17 NOTE — PROGRESS NOTES
[facility] [employer] single pacemaker AAI    Atrial imped 415  R waves 2.8  17.8% paced   Atrial tach/svt

## 2022-03-23 ENCOUNTER — HOSPITAL ENCOUNTER (OUTPATIENT)
Age: 47
Setting detail: SPECIMEN
Discharge: HOME | End: 2022-03-23

## 2022-03-23 LAB
ALBUMIN SERPL-MCNC: 4.4 G/DL (ref 3.5–5.2)
ALBUMIN/GLOBULIN RATIO: 1.7 (ref 1–2.5)
ALP BLD-CCNC: 70 U/L (ref 35–104)
ALT SERPL-CCNC: 9 U/L (ref 5–33)
ANION GAP SERPL CALCULATED.3IONS-SCNC: 15 MMOL/L (ref 9–17)
AST SERPL-CCNC: 13 U/L
BILIRUB SERPL-MCNC: 0.49 MG/DL (ref 0.3–1.2)
BUN BLDV-MCNC: 10 MG/DL (ref 6–20)
CALCIUM SERPL-MCNC: 9.7 MG/DL (ref 8.6–10.4)
CHLORIDE BLD-SCNC: 103 MMOL/L (ref 98–107)
CHOLESTEROL/HDL RATIO: 2.7
CHOLESTEROL: 123 MG/DL
CO2: 25 MMOL/L (ref 20–31)
CREAT SERPL-MCNC: 0.56 MG/DL (ref 0.5–0.9)
GFR AFRICAN AMERICAN: >60 ML/MIN
GFR NON-AFRICAN AMERICAN: >60 ML/MIN
GFR SERPL CREATININE-BSD FRML MDRD: ABNORMAL ML/MIN/{1.73_M2}
GLUCOSE BLD-MCNC: 240 MG/DL (ref 70–99)
HCT VFR BLD CALC: 42.3 % (ref 36.3–47.1)
HDLC SERPL-MCNC: 46 MG/DL
HEMOGLOBIN: 14 G/DL (ref 11.9–15.1)
LDL CHOLESTEROL: 61 MG/DL (ref 0–130)
MCH RBC QN AUTO: 30.5 PG (ref 25.2–33.5)
MCHC RBC AUTO-ENTMCNC: 33.1 G/DL (ref 28.4–34.8)
MCV RBC AUTO: 92.2 FL (ref 82.6–102.9)
NRBC AUTOMATED: 0 PER 100 WBC
PDW BLD-RTO: 12.3 % (ref 11.8–14.4)
PLATELET # BLD: 261 K/UL (ref 138–453)
PMV BLD AUTO: 10.5 FL (ref 8.1–13.5)
POTASSIUM SERPL-SCNC: 4.3 MMOL/L (ref 3.7–5.3)
RBC # BLD: 4.59 M/UL (ref 3.95–5.11)
SODIUM BLD-SCNC: 143 MMOL/L (ref 135–144)
THYROXINE, FREE: 1.67 NG/DL (ref 0.93–1.7)
TOTAL PROTEIN: 7 G/DL (ref 6.4–8.3)
TRIGL SERPL-MCNC: 80 MG/DL
TSH SERPL DL<=0.05 MIU/L-ACNC: 0.1 MIU/L (ref 0.3–5)
VITAMIN D 25-HYDROXY: 23.5 NG/ML
WBC # BLD: 6.3 K/UL (ref 3.5–11.3)

## 2022-04-19 ENCOUNTER — TELEPHONE (OUTPATIENT)
Dept: CARDIOLOGY CLINIC | Age: 47
End: 2022-04-19

## 2022-04-19 NOTE — LETTER
[address] [place] [address] [address]  [place] [address]  Phone: [phone]  Fax: [phone]        April 25, 2022    [address] [facility]      Dear Janel Benz:    Our office has not been able to contact you by phone. Please call the office at [phone] to reschedule your pacemaker appointment. If you have any questions or concerns, please don't hesitate to call.     Sincerely,        Oneil Grover

## 2022-05-20 ENCOUNTER — HOSPITAL ENCOUNTER (EMERGENCY)
Age: 47
Discharge: HOME | End: 2022-05-20
Payer: MEDICARE

## 2022-05-20 VITALS
HEART RATE: 60 BPM | BODY MASS INDEX: 34.84 KG/M2 | TEMPERATURE: 97.8 F | SYSTOLIC BLOOD PRESSURE: 122 MMHG | OXYGEN SATURATION: 97 % | WEIGHT: 209.38 LBS | RESPIRATION RATE: 16 BRPM | DIASTOLIC BLOOD PRESSURE: 81 MMHG

## 2022-05-20 DIAGNOSIS — J06.9 VIRAL URI WITH COUGH: ICD-10-CM

## 2022-05-20 DIAGNOSIS — H65.01 NON-RECURRENT ACUTE SEROUS OTITIS MEDIA OF RIGHT EAR: Primary | ICD-10-CM

## 2022-05-20 PROCEDURE — 99213 OFFICE O/P EST LOW 20 MIN: CPT

## 2022-05-20 PROCEDURE — 99213 OFFICE O/P EST LOW 20 MIN: CPT | Performed by: NURSE PRACTITIONER

## 2022-05-20 RX ORDER — FLUCONAZOLE 150 MG/1
150 TABLET ORAL ONCE
Qty: 1 TABLET | Refills: 0 | Status: SHIPPED | OUTPATIENT
Start: 2022-05-20 | End: 2022-05-20

## 2022-05-20 RX ORDER — AMOXICILLIN 500 MG/1
500 CAPSULE ORAL 2 TIMES DAILY
Qty: 20 CAPSULE | Refills: 0 | Status: SHIPPED | OUTPATIENT
Start: 2022-05-20 | End: 2022-05-30

## 2022-05-20 ASSESSMENT — PAIN - FUNCTIONAL ASSESSMENT
PAIN_FUNCTIONAL_ASSESSMENT: 0-10
PAIN_FUNCTIONAL_ASSESSMENT: ACTIVITIES ARE NOT PREVENTED

## 2022-05-20 ASSESSMENT — PAIN DESCRIPTION - DESCRIPTORS: DESCRIPTORS: ACHING;SHARP

## 2022-05-20 ASSESSMENT — ENCOUNTER SYMPTOMS
DIARRHEA: 0
SORE THROAT: 0
SINUS PRESSURE: 0
NAUSEA: 0
VOMITING: 0
TROUBLE SWALLOWING: 0
RHINORRHEA: 0
COUGH: 1
SHORTNESS OF BREATH: 1
BACK PAIN: 0

## 2022-05-20 ASSESSMENT — PAIN DESCRIPTION - FREQUENCY: FREQUENCY: CONTINUOUS

## 2022-05-20 ASSESSMENT — PAIN SCALES - GENERAL: PAINLEVEL_OUTOF10: 6

## 2022-05-20 ASSESSMENT — PAIN DESCRIPTION - ONSET: ONSET: PROGRESSIVE

## 2022-05-20 ASSESSMENT — PAIN DESCRIPTION - PAIN TYPE: TYPE: ACUTE PAIN

## 2022-05-20 ASSESSMENT — PAIN DESCRIPTION - LOCATION: LOCATION: EAR

## 2022-05-20 ASSESSMENT — PAIN DESCRIPTION - ORIENTATION: ORIENTATION: RIGHT

## 2022-05-20 NOTE — LETTER
[facility]  [address] [address]  Phone: [phone]               May 20, 2022    Patient: Eloina Mo   YOB: 1975   Date of Visit: 5/20/2022       To Whom It May Concern:    Sumanth Olsen was seen and treated in our emergency department on 5/20/2022. She may return to work on 5/21/22.       Sincerely,       Israel Smith RN         Signature:__________________________________

## 2022-05-20 NOTE — ED PROVIDER NOTES
[facility]       CHIEF COMPLAINT       Chief Complaint   Patient presents with    Cough    Shortness of Breath    Otalgia     right       Nurses Notes reviewed and I agree except as noted in the HPI. HISTORY OF PRESENT ILLNESS   Aimee Nassar is a 52 y.o. female who presents to the urgent care center complaining of ear pain to the right ear. Patient rates her pain 6 on a 10 scale. Patient states that her ear has been hurting for about 5 days. He denies any fever, chills any vertigo or tinnitus. Patient states that she has had a nonproductive cough with slight shortness of breath. Patient denies any chest pain patient denies any swelling of the lower extremities. Patient does have a history of COPD and CAD. Patient at the present time states that she has been working full-time and has had no difficulties. Patient at the present time sitting on table skin is warm dry patient does not appear to be in any acute distress. SeeTemplate. The history is provided by the patient. No  was used.    Cough  Cough characteristics:  Non-productive  Sputum characteristics:  Unable to specify  Severity:  Mild  Onset quality:  Gradual  Duration:  2 days  Timing:  Intermittent  Progression:  Unchanged  Chronicity:  Recurrent  Smoker: yes    Context: smoke exposure    Relieved by:  Nothing  Worsened by:  Smoking  Ineffective treatments:  None tried  Associated symptoms: ear pain and shortness of breath    Associated symptoms: no chest pain, no chills, no fever, no headaches, no rash, no rhinorrhea and no sore throat    Ear pain:     Location:  Right    Severity:  Moderate    Onset quality:  Sudden    Duration:  5 days    Timing:  Constant    Progression:  Unchanged    Chronicity:  New  Shortness of breath:     Severity:  Mild    Onset quality:  Gradual    Duration:  2 days    Timing:  Intermittent    Progression:  Waxing and waning  Shortness of Breath  Associated symptoms: cough and ear pain    Associated symptoms: no chest pain, no fever, no headaches, no rash, no sore throat and no vomiting    Otalgia  Associated symptoms include shortness of breath. Pertinent negatives include no chest pain and no headaches. REVIEW OF SYSTEMS     Review of Systems   Constitutional: Negative for activity change, appetite change, chills, fatigue and fever. HENT: Positive for congestion and ear pain. Negative for rhinorrhea, sinus pressure, sore throat and trouble swallowing. Respiratory: Positive for cough and shortness of breath. Cardiovascular: Negative for chest pain. Gastrointestinal: Negative for diarrhea, nausea and vomiting. Musculoskeletal: Negative for back pain and neck stiffness. Skin: Negative for rash. Allergic/Immunologic: Negative for environmental allergies. Neurological: Negative for dizziness, light-headedness and headaches. Hematological: Negative for adenopathy. PAST MEDICAL HISTORY         Diagnosis Date    Anxiety     Anxiety and poor coping skills and recent altercations with her boyfriend's ex-girlfriend.  Arrhythmia     Possible arrhythmia induced shortness of breath.  Arthritis     Atypical chest pain     Bradycardia 3/19/2014    CAD (coronary artery disease)     Cancer (HCC)     Follicular thyroid cancer, S/P total thyroidectomy in 2005, radioactive iodine ablation in 2007.  Chest pain 3/19/2014    CHF (congestive heart failure) ([address]     Cholelithiasis 05/2019    COPD (chronic obstructive pulmonary disease) (HCC)     History of chronic obstructive pulmonary disease.  Cystocele, midline     Depression     Diabetes mellitus ([address]     DJD (degenerative joint disease)     Environmental allergies 5/14/2014    Fatty liver 6/2016    Female stress incontinence     Fibromyalgia 5/14/2014    Herpes 2016    History of alcohol abuse     History of heavy alcohol use, but recently quit.      History of MRSA infection     History of seizure disorder     History of syncope     History of syncopal episode.  Hx of blood clots     lower leg    Hyperlipidemia     Hypotension     Hypothyroidism      History of hypothyroidism but her TSH is 0.04 indicating hyperthyroidism.  MDRO (multiple drug resistant organisms) resistance 2005 2006    Medtronic dual pacer  6/8/2018    medtronic loop recorder 12/4/2012    Medtronic single pacer /AAI paced  5/9/4586    Metabolic syndrome      Metabolic syndrome     Migraine headache     Mild pulmonary hypertension (HCC)     Obesity, Class II, BMI 35.0-39.9, with comorbidity (see actual BMI)     PAC (premature atrial contraction)     Palpitations     Personal history of methicillin resistant Staphylococcus aureus     history of MRSA    PVC's      S/P ablation operation for arrhythmia 2003    Ep Study Ablation - [place], [place].  Scoliosis     Seizures (HCC)     Sinus bradycardia     Sinus bradycardia with infrequent PACs.  Superficial phlebitis     Tachycardia     Type II or unspecified type diabetes mellitus without mention of complication, not stated as uncontrolled     Unstable angina (HCC)     Possible unstable angina. SURGICALHISTORY     Patient  has a past surgical history that includes Thyroidectomy (2005); Tubal ligation (1999); Dilation and curettage of uterus (1995); cyst removal; cardiovascular stress test (3 30 2011); transthoracic echocardiogram (3 30 2011); cardiovascular stress test (9 15 2003); ablation of dysrhythmic focus (2010); laparoscopy (01/02/2013); bladder repair; Cardiac catheterization; Cardiac surgery (5 -8 -12); back surgery (2010); Abdomen surgery; Hysterectomy, total abdominal (1-12-16); Atrial ablation surgery (); other surgical history (04/27/2017); and Pacemaker insertion (05/30/2018).     CURRENT MEDICATIONS       Discharge Medication List as of 5/20/2022  9:04 AM CONTINUE these medications which have NOT CHANGED    Details   polyethylene glycol (MIRALAX) 17 GM/SCOOP powder POLYETHYLENE GLYCOL 3350 MiraLax Oral Powder 03/11/2020 Provider: Kayce Larose CNP 03- [facility] [id number])Historical Med      traZODone (DESYREL) 50 MG tablet traZODone traZODone HCl 50 MG Oral Tablet 10/09/2020 Provider: 10-  [facility] 87 ([id number])Historical Med      benztropine (COGENTIN) 0.5 MG tablet Historical Med      glipiZIDE (GLUCOTROL XL) 5 MG extended release tablet Historical Med      gabapentin (NEURONTIN) 800 MG tablet Take 800 mg by mouth 3 times daily.  Historical Med      OXcarbazepine (TRILEPTAL) 300 MG tablet Take 300 mg by mouth 2 times dailyHistorical Med      Cariprazine HCl (VRAYLAR PO) Take by mouthHistorical Med      indomethacin (INDOCIN) 25 MG capsule Take 2 capsules by mouth 3 times daily (with meals) for 10 days, Disp-60 capsule, R-0Normal      fluticasone (FLONASE) 50 MCG/ACT nasal spray 2 sprays by Nasal route daily, Disp-1 Bottle, R-0Normal      DULoxetine (CYMBALTA) 30 MG extended release capsule Take 1 capsule by mouth 2 times daily, Disp-60 capsule, R-0Normal      levothyroxine (SYNTHROID) 150 MCG tablet Take 1 tablet by mouth daily, Disp-30 tablet, R-5Normal      meclizine (ANTIVERT) 25 MG tablet Use  In am and  At night  And  Every  6 hours  As needed   dizziness, Disp-60 tablet, R-1Normal      acetaminophen (TYLENOL) 500 MG tablet Take 1 tablet by mouth 4 times daily as needed for Pain, Disp-120 tablet, R-0Print      potassium chloride (KLOR-CON M) 20 MEQ extended release tablet take 1 tablet by mouth once daily, Disp-30 tablet, R-7Normal      bumetanide (BUMEX) 1 MG tablet Take 1 mg by mouth daily as needed , R-0Historical Med      albuterol sulfate HFA (PROAIR HFA) 108 (90 BASE) MCG/ACT inhaler Inhale 2 puffs into the lungs every 4 hours as needed for Wheezing, Disp-1 Inhaler, R-1 No tonsillar exudate or tonsillar abscesses. Eyes:      Conjunctiva/sclera: Conjunctivae normal.      Pupils: Pupils are equal, round, and reactive to light. Cardiovascular:      Rate and Rhythm: Normal rate and regular rhythm. Heart sounds: Normal heart sounds, S1 normal and S2 normal.   Pulmonary:      Effort: Pulmonary effort is normal. No tachypnea or accessory muscle usage. Breath sounds: Normal breath sounds. No decreased breath sounds, wheezing, rhonchi or rales. Chest:   Breasts:      Right: No supraclavicular adenopathy. Left: No supraclavicular adenopathy. Abdominal:      General: Bowel sounds are normal.      Palpations: Abdomen is soft. Tenderness: There is no abdominal tenderness. There is no right CVA tenderness, left CVA tenderness or guarding. Negative signs include Cobos's sign. Musculoskeletal:      Cervical back: Full passive range of motion without pain and normal range of motion. No rigidity. No muscular tenderness. Normal range of motion. Right lower leg: No edema. Left lower leg: No edema. Lymphadenopathy:      Head:      Right side of head: No submental, submandibular, tonsillar, preauricular, posterior auricular or occipital adenopathy. Left side of head: No submental, submandibular, tonsillar, preauricular, posterior auricular or occipital adenopathy. Cervical: No cervical adenopathy. Right cervical: No superficial, deep or posterior cervical adenopathy. Left cervical: No superficial, deep or posterior cervical adenopathy. Upper Body:      Right upper body: No supraclavicular adenopathy. Left upper body: No supraclavicular adenopathy. Skin:     General: Skin is warm and dry. Capillary Refill: Capillary refill takes less than 2 seconds. Findings: No rash. Neurological:      Mental Status: She is alert and oriented to person, place, and time.    Psychiatric:         Mood and Affect: Mood normal. Behavior: Behavior normal. Behavior is cooperative. DIAGNOSTIC RESULTS     Labs:No results found for this visit on 05/20/22. IMAGING:    No orders to display         EKG:      URGENT CARE COURSE:     Vitals:    05/20/22 0842 05/20/22 0846   BP:  122/81   Pulse:  60   Resp:  16   Temp:  97.8 °F (36.6 °C)   TempSrc:  Temporal   SpO2:  97%   Weight: 209 lb 6 oz (95 kg)        Medications - No data to display         PROCEDURES:  None    FINAL IMPRESSION      1. Non-recurrent acute serous otitis media of right ear    2. Viral URI with cough          DISPOSITION/ PLAN      I did discuss clinical findings with the patient as well as vital signs in assessment findings. Patient/Patient representative was advised they have otitis media. Patient is afebrile and stable. The patient/Patient representative was advised to continue with Motrin and Tylenol for pain and discomfort. The patient/Patient representative was also advised to monitor for any changes such as development of fever, drainage from the ear, redness or tenderness to the outer ear or the behind ear. They're also to monitor for any stiffness of the neck, vertigo, hearing loss or tinnitus. Advised to follow up with family doctor in the next 2-3 days for reevaluation. The patient may return to urgent care if does not get better or symptoms worsen. However the patient is advised to go to ER immediately if present symptoms worsen, high fever >102 , Ear pain, lethargy or new symptoms develop. Patient/ parents understands this approach of home management and agrees to the treatment plan.       PATIENT REFERRED TO:  DARIANA Falcon - CNP  [address] [place] [address] / [place] [id number]      DISCHARGE MEDICATIONS:  Discharge Medication List as of 5/20/2022  9:04 AM      START taking these medications    Details   amoxicillin (AMOXIL) 500 MG capsule Take 1 capsule by mouth 2 times daily for 10 days, Disp-20 capsule, R-0Normal      fluconazole (DIFLUCAN) 150 MG tablet Take 1 tablet by mouth once for 1 dose, Disp-1 tablet, R-0Normal             Discharge Medication List as of 5/20/2022  9:04 AM          Discharge Medication List as of 5/20/2022  9:04 AM          DARIANA Whitman CNP    (Please note that portions of this note were completed with a voice recognition program. Efforts were made to edit the dictations but occasionally words are mis-transcribed.)           DARIANA Whitman CNP  05/20/22 0436

## 2022-06-08 ENCOUNTER — NURSE ONLY (OUTPATIENT)
Dept: CARDIOLOGY CLINIC | Age: 47
End: 2022-06-08
Payer: MEDICARE

## 2022-06-08 DIAGNOSIS — Z95.0 PACEMAKER: Primary | ICD-10-CM

## 2022-06-08 PROCEDURE — 93279 PRGRMG DEV EVAL PM/LDLS PM: CPT | Performed by: INTERNAL MEDICINE

## 2022-06-08 NOTE — PROGRESS NOTES
medtronic single AAI pacer in office     Compliance issues with carelink, states she is currently homeless, staying with friends. Does have carelink monitor.   I did schedule a 6 mo fu in office in case she doesn't send but states she is looking for housing     5.5 years on device   Atrial imped 398  18.5% paced   P waves 1.4-2  Threshold 0.5 @ 0.4  Given carelink schedule

## 2022-07-27 PROCEDURE — 93296 REM INTERROG EVL PM/IDS: CPT | Performed by: INTERNAL MEDICINE

## 2022-07-27 PROCEDURE — 93294 REM INTERROG EVL PM/LDLS PM: CPT | Performed by: INTERNAL MEDICINE

## 2022-07-28 ENCOUNTER — PROCEDURE VISIT (OUTPATIENT)
Dept: CARDIOLOGY CLINIC | Age: 47
End: 2022-07-28
Payer: MEDICARE

## 2022-07-28 DIAGNOSIS — Z95.0 PACEMAKER: Primary | ICD-10-CM

## 2022-07-28 NOTE — PROGRESS NOTES
Dr groves pt  Medtronic single pacer remote   Battery 3.5-6.5 yrs remaining    Aai 60    P waves >2.8    Atrial impedence 401    A paced 24.5%    1 atrial high rate episode with channel marker only

## 2022-08-12 ENCOUNTER — HOSPITAL ENCOUNTER (EMERGENCY)
Age: 47
Discharge: HOME | End: 2022-08-12
Payer: MEDICARE

## 2022-08-12 VITALS
HEIGHT: 65 IN | WEIGHT: 210 LBS | SYSTOLIC BLOOD PRESSURE: 130 MMHG | DIASTOLIC BLOOD PRESSURE: 94 MMHG | RESPIRATION RATE: 12 BRPM | OXYGEN SATURATION: 98 % | BODY MASS INDEX: 34.99 KG/M2 | TEMPERATURE: 97.8 F | HEART RATE: 85 BPM

## 2022-08-12 DIAGNOSIS — R23.8 SKIN IRRITATION: Primary | ICD-10-CM

## 2022-08-12 PROCEDURE — 99213 OFFICE O/P EST LOW 20 MIN: CPT | Performed by: NURSE PRACTITIONER

## 2022-08-12 PROCEDURE — 99213 OFFICE O/P EST LOW 20 MIN: CPT

## 2022-08-12 RX ORDER — DOXYCYCLINE HYCLATE 100 MG
100 TABLET ORAL 2 TIMES DAILY
Qty: 20 TABLET | Refills: 0 | Status: SHIPPED | OUTPATIENT
Start: 2022-08-12 | End: 2022-08-22

## 2022-08-12 ASSESSMENT — ENCOUNTER SYMPTOMS
SHORTNESS OF BREATH: 0
RHINORRHEA: 0
DIARRHEA: 0
EYE REDNESS: 0
SORE THROAT: 0
EYE DISCHARGE: 0
VOMITING: 0
COUGH: 0
NAUSEA: 0
TROUBLE SWALLOWING: 0

## 2022-08-12 ASSESSMENT — PAIN DESCRIPTION - LOCATION: LOCATION: UMBILICUS

## 2022-08-12 ASSESSMENT — PAIN SCALES - GENERAL: PAINLEVEL_OUTOF10: 7

## 2022-08-12 ASSESSMENT — PAIN - FUNCTIONAL ASSESSMENT: PAIN_FUNCTIONAL_ASSESSMENT: 0-10

## 2022-08-12 NOTE — ED TRIAGE NOTES
Pt denies contacting DR Crockett office regarding s/s.  Pts pants are snug around waist and snap hits right at umbilicus

## 2022-08-12 NOTE — ED PROVIDER NOTES
[facility] Encounter      [facility]       Chief Complaint   Patient presents with    Post-op Problem     C/o umbilical surgical inscion pain and drainage  surgery last week dr Torito Maya- gall bladder       Nurses Notes reviewed and I agree except as noted in the HPI. HISTORY OF PRESENT ILLNESS   Olivia Jenkins is a 52 y.o. female who presents for evaluation of possible skin infection. Patient had a laparoscopic cholecystectomy last week. She complains of umbilicus pain, irritation, redness. Associated scant drainage, intermittent. No fever. No abdominal pain, distention. Medical history of MRSA. No treatment prior to arrival.    REVIEW OF SYSTEMS     Review of Systems   Constitutional:  Negative for chills, diaphoresis, fatigue and fever. HENT:  Negative for congestion, ear pain, rhinorrhea, sore throat and trouble swallowing. Eyes:  Negative for discharge and redness. Respiratory:  Negative for cough and shortness of breath. Cardiovascular:  Negative for chest pain. Gastrointestinal:  Negative for diarrhea, nausea and vomiting. Genitourinary:  Negative for decreased urine volume. Musculoskeletal:  Negative for neck pain and neck stiffness. Skin:  Positive for wound. Negative for rash. Neurological:  Negative for headaches. Hematological:  Negative for adenopathy. Psychiatric/Behavioral:  Negative for sleep disturbance. PAST MEDICAL HISTORY         Diagnosis Date    Anxiety     Anxiety and poor coping skills and recent altercations with her boyfriend's ex-girlfriend.  Arrhythmia     Possible arrhythmia induced shortness of breath.  Arthritis     Atypical chest pain     Bradycardia 3/19/2014    CAD (coronary artery disease)     Cancer (HCC)     Follicular thyroid cancer, S/P total thyroidectomy in 2005, radioactive iodine ablation in 2007.      Chest pain 3/19/2014    CHF (congestive heart failure) (HCC)     Cholelithiasis 05/2019    COPD (chronic obstructive pulmonary disease) (HCC)     History of chronic obstructive pulmonary disease.  Cystocele, midline     Depression     Diabetes mellitus ([address]     DJD (degenerative joint disease)     Environmental allergies 5/14/2014    Fatty liver 6/2016    Female stress incontinence     Fibromyalgia 5/14/2014    Herpes 2016    History of alcohol abuse     History of heavy alcohol use, but recently quit.  History of MRSA infection     History of seizure disorder     History of syncope     History of syncopal episode.  Hx of blood clots     lower leg    Hyperlipidemia     Hypotension     Hypothyroidism      History of hypothyroidism but her TSH is 0.04 indicating hyperthyroidism.  MDRO (multiple drug resistant organisms) resistance 2005 2006    Medtronic dual pacer  6/8/2018    medtronic loop recorder 12/4/2012    Medtronic single pacer /AAI paced  9/3/6146    Metabolic syndrome      Metabolic syndrome     Migraine headache     Mild pulmonary hypertension (HCC)     Obesity, Class II, BMI 35.0-39.9, with comorbidity (see actual BMI)     PAC (premature atrial contraction)     Palpitations     Personal history of methicillin resistant Staphylococcus aureus     history of MRSA    PVC's      S/P ablation operation for arrhythmia 2003    [facility].  Scoliosis     Seizures (HCC)     Sinus bradycardia     Sinus bradycardia with infrequent PACs.  Superficial phlebitis     Tachycardia     Type II or unspecified type diabetes mellitus without mention of complication, not stated as uncontrolled     Unstable angina (HCC)     Possible unstable angina. SURGICAL HISTORY     Patient  has a past surgical history that includes Thyroidectomy (2005); Tubal ligation (1999);  Dilation and curettage of uterus (1995); cyst removal; cardiovascular stress test (3 30 2011); transthoracic echocardiogram (3 30 2011); cardiovascular stress test (9 15 2003); ablation of dysrhythmic focus (2010); laparoscopy (01/02/2013); bladder repair; Cardiac catheterization; Cardiac surgery (5 -8 -12); back surgery (2010); Abdomen surgery; Hysterectomy, total abdominal (1-12-16); Atrial ablation surgery (); other surgical history (04/27/2017); and Pacemaker insertion (05/30/2018). CURRENT MEDICATIONS       Discharge Medication List as of 8/12/2022  1:11 PM        CONTINUE these medications which have NOT CHANGED    Details   polyethylene glycol (GLYCOLAX) 17 GM/SCOOP powder POLYETHYLENE GLYCOL 3350 MiraLax Oral Powder 03/11/2020 Provider: Wagner Gilliland CNP 03- [facility] 87 ([id number])Historical Med      traZODone (DESYREL) 50 MG tablet traZODone traZODone HCl 50 MG Oral Tablet 10/09/2020 Provider: 10-  [facility] 87 ([id number])Historical Med      benztropine (COGENTIN) 0.5 MG tablet Historical Med      glipiZIDE (GLUCOTROL XL) 5 MG extended release tablet Historical Med      gabapentin (NEURONTIN) 800 MG tablet Take 800 mg by mouth 3 times daily.  Historical Med      OXcarbazepine (TRILEPTAL) 300 MG tablet Take 300 mg by mouth 2 times dailyHistorical Med      Cariprazine HCl (VRAYLAR PO) Take by mouthHistorical Med      indomethacin (INDOCIN) 25 MG capsule Take 2 capsules by mouth 3 times daily (with meals) for 10 days, Disp-60 capsule, R-0Normal      fluticasone (FLONASE) 50 MCG/ACT nasal spray 2 sprays by Nasal route daily, Disp-1 Bottle, R-0Normal      DULoxetine (CYMBALTA) 30 MG extended release capsule Take 1 capsule by mouth 2 times daily, Disp-60 capsule, R-0Normal      levothyroxine (SYNTHROID) 150 MCG tablet Take 1 tablet by mouth daily, Disp-30 tablet, R-5Normal      meclizine (ANTIVERT) 25 MG tablet Use  In am and  At night  And  Every  6 hours  As needed   dizziness, Disp-60 tablet, R-1Normal      acetaminophen (TYLENOL) 500 MG tablet Take 1 tablet by mouth 4 times daily as needed for Pain, Disp-120 tablet, R-0Print      potassium chloride (KLOR-CON M) 20 MEQ extended release tablet take 1 tablet by mouth once daily, Disp-30 tablet, R-7Normal      bumetanide (BUMEX) 1 MG tablet Take 1 mg by mouth daily as needed , R-0Historical Med      albuterol sulfate HFA (PROAIR HFA) 108 (90 BASE) MCG/ACT inhaler Inhale 2 puffs into the lungs every 4 hours as needed for Wheezing, Disp-1 Inhaler, R-1             ALLERGIES     Patient is is allergic to latex, niaspan [niacin er], tape [adhesive tape], and morphine. FAMILY HISTORY     Patient'sfamily history includes COPD in her mother; Diabetes in her mother; Heart Disease in her mother. SOCIAL HISTORY     Patient  reports that she has been smoking cigarettes. She has a 10.00 pack-year smoking history. She has never used smokeless tobacco. She reports current drug use. Drug: Marijuana Paulino Blow). She reports that she does not drink alcohol. PHYSICAL EXAM     ED TRIAGE VITALS  BP: (!) 130/94, Temp: 97.8 °F (36.6 °C), Heart Rate: 85, Resp: 12, SpO2: 98 %  Physical Exam  Vitals and nursing note reviewed. Constitutional:       General: She is not in acute distress. Appearance: Normal appearance. HENT:      Head: Normocephalic and atraumatic. Right Ear: External ear normal.      Left Ear: External ear normal.      Nose: No congestion. Eyes:      General: No scleral icterus. Conjunctiva/sclera: Conjunctivae normal.   Pulmonary:      Effort: Pulmonary effort is normal. No respiratory distress. Musculoskeletal:      Cervical back: Normal range of motion. Skin:     General: Skin is warm and dry. Capillary Refill: Capillary refill takes less than 2 seconds. Coloration: Skin is not jaundiced. Findings: Erythema and wound (Skin irritation to umbilicus. Consider irritation secondary to moisture to the area versus infection. No abscess.) present. No abscess or rash.    Neurological:      Mental Status: She is alert and oriented to person, place, and time. Sensory: Sensation is intact. Psychiatric:         Mood and Affect: Mood normal.         Behavior: Behavior normal. Behavior is cooperative. DIAGNOSTIC RESULTS   Labs: No results found for this visit on 08/12/22. IMAGING:  No orders to display     URGENT CARE COURSE:     Vitals:    08/12/22 1258   BP: (!) 130/94   Pulse: 85   Resp: 12   Temp: 97.8 °F (36.6 °C)   SpO2: 98%   Weight: 210 lb (95.3 kg)   Height: 5' 5\" (1.651 m)       Medications - No data to display  PROCEDURES:  None  FINALIMPRESSION      1. Skin irritation        DISPOSITION/PLAN   DISPOSITION Decision To Discharge 08/12/2022 01:09:12 PM  Skin irritation to the umbilicus area. Differential diagnosis includes cellulitis. No abscess. Medications prescribed due to history of MRSA. Follow-up with general surgeon as needed. If any distress go to ER. PATIENT REFERRED TO:  DARIANA Duckworth - CNP  [address]      Follow up as needed. Medications as prescribed. Warm compress to area 3x/day followed by ointment. If worse go to ER. DISCHARGE MEDICATIONS:  Discharge Medication List as of 8/12/2022  1:11 PM        START taking these medications    Details   doxycycline hyclate (VIBRA-TABS) 100 MG tablet Take 1 tablet by mouth in the morning and 1 tablet before bedtime. Do all this for 10 days. , Disp-20 tablet, R-0Normal      mupirocin (BACTROBAN) 2 % ointment Apply topically 3 times daily. , Disp-30 g, R-0, Normal           Discharge Medication List as of 8/12/2022  1:11 PM          Anaya Maddox [address] [facility], APRN - CNP  08/12/22 1712

## 2022-08-12 NOTE — ED TRIAGE NOTES
To room1 c/o \" my gall bladder inscion site at umbilicus ( surgery 1 week ago ) Is red , drainage , and having pain

## 2022-10-07 ENCOUNTER — HOSPITAL ENCOUNTER (EMERGENCY)
Age: 47
Discharge: HOME | End: 2022-10-07
Payer: MEDICARE

## 2022-10-07 VITALS
HEART RATE: 75 BPM | RESPIRATION RATE: 14 BRPM | WEIGHT: 216 LBS | OXYGEN SATURATION: 97 % | SYSTOLIC BLOOD PRESSURE: 121 MMHG | DIASTOLIC BLOOD PRESSURE: 74 MMHG | BODY MASS INDEX: 35.94 KG/M2 | TEMPERATURE: 97.1 F

## 2022-10-07 DIAGNOSIS — K52.9 COLITIS: Primary | ICD-10-CM

## 2022-10-07 PROCEDURE — 99212 OFFICE O/P EST SF 10 MIN: CPT | Performed by: NURSE PRACTITIONER

## 2022-10-07 PROCEDURE — 99213 OFFICE O/P EST LOW 20 MIN: CPT

## 2022-10-07 RX ORDER — ONDANSETRON 4 MG/1
4 TABLET, ORALLY DISINTEGRATING ORAL EVERY 8 HOURS PRN
Qty: 15 TABLET | Refills: 0 | Status: SHIPPED | OUTPATIENT
Start: 2022-10-07

## 2022-10-07 ASSESSMENT — PAIN - FUNCTIONAL ASSESSMENT: PAIN_FUNCTIONAL_ASSESSMENT: NONE - DENIES PAIN

## 2022-10-07 ASSESSMENT — ENCOUNTER SYMPTOMS
VOMITING: 1
CHOKING: 0
STRIDOR: 0
ABDOMINAL PAIN: 0
SHORTNESS OF BREATH: 0
DIARRHEA: 1
WHEEZING: 0
COUGH: 0
SORE THROAT: 0
CHEST TIGHTNESS: 0
NAUSEA: 1
APNEA: 0

## 2022-10-07 NOTE — Clinical Note
Asif Núñez was seen and treated in our emergency department on 10/7/2022. She may return to work on 10/10/2022. If you have any questions or concerns, please don't hesitate to call.       Jennifer Castro, APRN - CNP

## 2022-10-07 NOTE — ED PROVIDER NOTES
[facility]      CHIEF COMPLAINT       Chief Complaint   Patient presents with    Emesis       Nurses Notes reviewed and I agree except as noted in the HPI. HISTORY [facility] Kera Marcelo is a 52 y.o. The history is provided by the patient and the spouse. No  was used. Nausea & Vomiting  Severity:  Severe  Duration:  2 days  Timing:  Intermittent  Number of daily episodes:  3  Quality:  Undigested food, stomach contents and bilious material  Progression:  Unchanged  Chronicity:  New  Recent urination:  Normal  Context: not post-tussive and not self-induced    Relieved by:  Nothing  Worsened by:  Nothing  Ineffective treatments:  None tried  Associated symptoms: diarrhea    Associated symptoms: no abdominal pain, no arthralgias, no chills, no cough, no fever, no headaches, no myalgias, no sore throat and no URI    Risk factors: no alcohol use, no diabetes, not pregnant, no prior abdominal surgery, no sick contacts, no suspect food intake and no travel to endemic areas      REVIEW OF SYSTEMS     Review of Systems   Constitutional:  Negative for activity change, appetite change, chills, diaphoresis, fatigue and fever. HENT:  Negative for sore throat. Respiratory:  Negative for apnea, cough, choking, chest tightness, shortness of breath, wheezing and stridor. Cardiovascular:  Negative for chest pain, palpitations and leg swelling. Gastrointestinal:  Positive for diarrhea, nausea and vomiting. Negative for abdominal pain. Musculoskeletal:  Negative for arthralgias and myalgias. Neurological:  Negative for headaches. PAST MEDICAL HISTORY         Diagnosis Date    Anxiety     Anxiety and poor coping skills and recent altercations with her boyfriend's ex-girlfriend. Arrhythmia     Possible arrhythmia induced shortness of breath.     Arthritis     Atypical chest pain     Bradycardia 3/19/2014    CAD (coronary artery disease)     Cancer ([address]     Follicular thyroid cancer, S/P total thyroidectomy in 2005, radioactive iodine ablation in 2007. Chest pain 3/19/2014    CHF (congestive heart failure) ([address]     Cholelithiasis 05/2019    COPD (chronic obstructive pulmonary disease) (HCC)     History of chronic obstructive pulmonary disease. Cystocele, midline     Depression     Diabetes mellitus ([address]     DJD (degenerative joint disease)     Environmental allergies 5/14/2014    Fatty liver 6/2016    Female stress incontinence     Fibromyalgia 5/14/2014    Herpes 2016    History of alcohol abuse     History of heavy alcohol use, but recently quit. History of MRSA infection     History of seizure disorder     History of syncope     History of syncopal episode. Hx of blood clots     lower leg    Hyperlipidemia     Hypotension     Hypothyroidism      History of hypothyroidism but her TSH is 0.04 indicating hyperthyroidism. MDRO (multiple drug resistant organisms) resistance 2005 2006    Medtronic dual pacer  6/8/2018    medtronic loop recorder 12/4/2012    Medtronic single pacer /AAI paced  2/7/3347    Metabolic syndrome      Metabolic syndrome     Migraine headache     Mild pulmonary hypertension (HCC)     Obesity, Class II, BMI 35.0-39.9, with comorbidity (see actual BMI)     PAC (premature atrial contraction)     Palpitations     Personal history of methicillin resistant Staphylococcus aureus     history of MRSA    PVC's      S/P ablation operation for arrhythmia 2003    Ep Study Ablation - [facility], [address]. Scoliosis     Seizures (HCC)     Sinus bradycardia     Sinus bradycardia with infrequent PACs. Superficial phlebitis     Tachycardia     Type II or unspecified type diabetes mellitus without mention of complication, not stated as uncontrolled     Unstable angina ([address]     Possible unstable angina.        SURGICAL HISTORY     Patient  has a past surgical history that includes Thyroidectomy (2005); Tubal ligation (1999); Dilation and curettage of uterus (1995); cyst removal; cardiovascular stress test (3 30 2011); transthoracic echocardiogram (3 30 2011); cardiovascular stress test (9 15 2003); ablation of dysrhythmic focus (2010); laparoscopy (01/02/2013); bladder repair; Cardiac catheterization; Cardiac surgery (5 -8 -12); back surgery (2010); Abdomen surgery; Hysterectomy, total abdominal (1-12-16); Atrial ablation surgery (); other surgical history (04/27/2017); and Pacemaker insertion (05/30/2018). CURRENT MEDICATIONS       Previous Medications    ACETAMINOPHEN (TYLENOL) 500 MG TABLET    Take 1 tablet by mouth 4 times daily as needed for Pain    ALBUTEROL SULFATE HFA (PROAIR HFA) 108 (90 BASE) MCG/ACT INHALER    Inhale 2 puffs into the lungs every 4 hours as needed for Wheezing    GABAPENTIN (NEURONTIN) 800 MG TABLET    Take 800 mg by mouth 3 times daily. GLIPIZIDE (GLUCOTROL XL) 5 MG EXTENDED RELEASE TABLET        LEVOTHYROXINE (SYNTHROID) 150 MCG TABLET    Take 1 tablet by mouth daily    POLYETHYLENE GLYCOL (GLYCOLAX) 17 GM/SCOOP POWDER    POLYETHYLENE GLYCOL 3350 MiraLax Oral Powder 03/11/2020 Provider: Yumiko Medeiros CNP 03- [facility] 87 ([id number])       ALLERGIES     Patient is is allergic to latex, niaspan [niacin er], tape [adhesive tape], and morphine. FAMILY HISTORY     Patient's family history includes COPD in her mother; Diabetes in her mother; Heart Disease in her mother. SOCIAL HISTORY     Patient  reports that she has been smoking cigarettes. She has a 10.00 pack-year smoking history. She has never used smokeless tobacco. She reports current drug use. Drug: Marijuana Norm Shoemaker). She reports that she does not drink alcohol. PHYSICAL EXAM     ED TRIAGE VITALS  BP: 121/74, Temp: 97.1 °F (36.2 °C), Heart Rate: 75, Resp: 14, SpO2: 97 %  Physical Exam  Vitals and nursing note reviewed.    Constitutional:       General: She is not in acute distress. Appearance: Normal appearance. She is obese. She is not ill-appearing, toxic-appearing or diaphoretic. HENT:      Head: Normocephalic and atraumatic. Right Ear: External ear normal.      Left Ear: External ear normal.   Eyes:      Extraocular Movements: Extraocular movements intact. Conjunctiva/sclera: Conjunctivae normal.   Pulmonary:      Effort: Pulmonary effort is normal.   Abdominal:      General: Bowel sounds are normal.      Palpations: Abdomen is soft. Tenderness: There is no abdominal tenderness. Musculoskeletal:         General: Normal range of motion. Cervical back: Normal range of motion. Skin:     General: Skin is warm and dry. Neurological:      General: No focal deficit present. Mental Status: She is alert and oriented to person, place, and time. Psychiatric:         Mood and Affect: Mood normal.         Behavior: Behavior normal.         Thought Content: Thought content normal.         Judgment: Judgment normal.       DIAGNOSTIC RESULTS   Labs:No results found for this visit on 10/07/22. IMAGING:  No orders to display     URGENT CARE COURSE:     Vitals:    10/07/22 1858   BP: 121/74   Pulse: 75   Resp: 14   Temp: 97.1 °F (36.2 °C)   TempSrc: Temporal   SpO2: 97%   Weight: 216 lb (98 kg)       Medications - No data to display  PROCEDURES:  None  FINAL IMPRESSION      1. Colitis        DISPOSITION/PLAN   Decision To Discharge     I recommend Clear Liquids only next 12-24 hours. If tolerated then BRAT Diet . Advise the patient that if worsening symptoms such as more than 6 stools per day, not voiding regularly, persistent vomiting not relieved with medication,unable to take oral fluids, high fever, severe weakness, lightheadedness or fainting, dry mucous membranes or other signs of dehydration, persisting or increasing abdominal pain, blood in stool or vomit, or failure to improve in 1-2 days.    The patient needs to be reevaluated at that time by their primary care provider for a recheck, OR go the Emergency Department for reevaluation. The patient or patient's representative are agreeable to the treatment plan and left ambulatory without any complaints at this time.           PATIENT REFERRED TO:  Larnell Boast, APRN - CNP  [address]  [phone]    Schedule an appointment as soon as possible for a visit     DISCHARGE MEDICATIONS:  New Prescriptions    ONDANSETRON (ZOFRAN ODT) 4 MG DISINTEGRATING TABLET    Take 1 tablet by mouth every 8 hours as needed for Nausea or Vomiting     Current Discharge Medication List          DARIANA Villa - CNP          ADRIANA Villa - [place]  10/07/22 2440

## 2022-10-07 NOTE — DISCHARGE INSTRUCTIONS
I recommend Clear Liquids only next 12-24 hours. If tolerated then BRAT Diet . Advise the patient that if worsening symptoms such as more than 6 stools per day, not voiding regularly, persistent vomiting not relieved with medication,unable to take oral fluids, high fever, severe weakness, lightheadedness or fainting, dry mucous membranes or other signs of dehydration, persisting or increasing abdominal pain, blood in stool or vomit, or failure to improve in 1-2 days. The patient needs to be reevaluated at that time by their primary care provider for a recheck, OR go the Emergency Department for reevaluation. The patient or patient's representative are agreeable to the treatment plan and left ambulatory without any complaints at this time.

## 2022-10-07 NOTE — ED TRIAGE NOTES
Pt presents to [facility] with c/o vomiting and diarrhea since yesterday. Need work note.
show

## 2022-10-14 ENCOUNTER — PROCEDURE VISIT (OUTPATIENT)
Dept: CARDIOLOGY CLINIC | Age: 47
End: 2022-10-14

## 2022-10-14 DIAGNOSIS — Z95.0 PACEMAKER: Primary | ICD-10-CM

## 2022-10-14 NOTE — PROGRESS NOTES
[facility] AAI single pacemaker     3.5-6 years on device   Atrial imped 399  P waves 2.8  Threshold not measured   18.5% paced

## 2022-12-19 ENCOUNTER — NURSE ONLY (OUTPATIENT)
Dept: CARDIOLOGY CLINIC | Age: 47
End: 2022-12-19
Payer: MEDICARE

## 2022-12-19 ENCOUNTER — OFFICE VISIT (OUTPATIENT)
Dept: CARDIOLOGY CLINIC | Age: 47
End: 2022-12-19
Payer: MEDICARE

## 2022-12-19 VITALS
HEART RATE: 70 BPM | WEIGHT: 204 LBS | DIASTOLIC BLOOD PRESSURE: 68 MMHG | HEIGHT: 65 IN | SYSTOLIC BLOOD PRESSURE: 100 MMHG | BODY MASS INDEX: 33.99 KG/M2

## 2022-12-19 DIAGNOSIS — R00.1 BRADYCARDIA: Primary | ICD-10-CM

## 2022-12-19 DIAGNOSIS — Z95.0 PACEMAKER: Primary | ICD-10-CM

## 2022-12-19 PROCEDURE — 4004F PT TOBACCO SCREEN RCVD TLK: CPT | Performed by: INTERNAL MEDICINE

## 2022-12-19 PROCEDURE — G8484 FLU IMMUNIZE NO ADMIN: HCPCS | Performed by: INTERNAL MEDICINE

## 2022-12-19 PROCEDURE — 99213 OFFICE O/P EST LOW 20 MIN: CPT | Performed by: INTERNAL MEDICINE

## 2022-12-19 PROCEDURE — 93000 ELECTROCARDIOGRAM COMPLETE: CPT | Performed by: INTERNAL MEDICINE

## 2022-12-19 PROCEDURE — 93279 PRGRMG DEV EVAL PM/LDLS PM: CPT | Performed by: INTERNAL MEDICINE

## 2022-12-19 PROCEDURE — G8417 CALC BMI ABV UP PARAM F/U: HCPCS | Performed by: INTERNAL MEDICINE

## 2022-12-19 PROCEDURE — G8428 CUR MEDS NOT DOCUMENT: HCPCS | Performed by: INTERNAL MEDICINE

## 2022-12-19 RX ORDER — SITAGLIPTIN 100 MG/1
TABLET, FILM COATED ORAL
COMMUNITY
Start: 2022-11-22

## 2022-12-19 RX ORDER — METFORMIN HYDROCHLORIDE 500 MG/1
TABLET, EXTENDED RELEASE ORAL
COMMUNITY
Start: 2022-11-22

## 2022-12-19 RX ORDER — NICOTINE POLACRILEX 4 MG/1
LOZENGE ORAL
COMMUNITY
Start: 2022-11-30

## 2022-12-19 NOTE — PROGRESS NOTES
DR NUNEZ PT  HERE TO SEE DR NUNEZ TODAY  [employer] SINGLE AAI PACEMAKER CHECK IN OFFICE     HAS CARELINK  AAI 60    PRESENTS IN AS 67  P WAVES 2-2.80  ATRIAL IMPEDENCE 424    ATRIAL THRESHOLD 0.5 @ 0.4  VENT AMPLITUDE 2.25 @ 0.4  A PACED 19.7%      PT DID HAVE 9 HIGH ATRIAL HIGH HEART RATES BUT NONE OF THEM HAD AN EKG ATTACHED SO NOT ABLE TO LOOK AT THEM

## 2022-12-19 NOTE — PROGRESS NOTES
Pt C/O Cp radiating to left arm, fingers going numb, heart palpitations, fatigued.       Pt denies  SOB, Headache, dizziness,swelling,

## 2022-12-19 NOTE — PROGRESS NOTES
[address] [place] [address] [address]  [place] [address]  Dept: [phone]  Dept Fax: [phone]  Loc: [phone]    Visit Date: 12/19/2022    Ms. Azael Gil is a 52 y.o. female  who presented for:  Chief Complaint   Patient presents with    Follow-up     1 year PVC       HPI:   51 yo F c hx of  hx AVNRT ablation in the past, s/p PPM(Single atrial PPM), DM,  HLD, COPD and Seizure disorder is here for a follow up. Had PPM placed on 5/30/18 by Dr. Jaz Dubois. Denies any chest pain, sob, palpitations, lightheadedness, dizziness, orthopnea, PND or pedal edema.        Current Outpatient Medications:     JANUVIA 100 MG tablet, , Disp: , Rfl:     levothyroxine (SYNTHROID) 150 MCG tablet, Take 1 tablet by mouth daily (Patient taking differently: Take 200 mcg by mouth daily), Disp: 30 tablet, Rfl: 5    acetaminophen (TYLENOL) 500 MG tablet, Take 1 tablet by mouth 4 times daily as needed for Pain, Disp: 120 tablet, Rfl: 0    albuterol sulfate HFA (PROAIR HFA) 108 (90 BASE) MCG/ACT inhaler, Inhale 2 puffs into the lungs every 4 hours as needed for Wheezing, Disp: 1 Inhaler, Rfl: 1    metFORMIN (GLUCOPHAGE-XR) 500 MG extended release tablet, , Disp: , Rfl:     NICORETTE MINI 4 MG lozenge, , Disp: , Rfl:     ondansetron (ZOFRAN ODT) 4 MG disintegrating tablet, Take 1 tablet by mouth every 8 hours as needed for Nausea or Vomiting (Patient not taking: Reported on 12/19/2022), Disp: 15 tablet, Rfl: 0    Past Medical History  Hope  has a past medical history of Anxiety, Arrhythmia, Arthritis, Atypical chest pain, Bradycardia, CAD (coronary artery disease), Cancer (HCC), Chest pain, CHF (congestive heart failure) ([address] Cholelithiasis, COPD (chronic obstructive pulmonary disease) ([address] Cystocele, midline, Depression, Diabetes mellitus ([address] DJD (degenerative joint disease), Environmental allergies, Fatty liver, Female stress incontinence, Fibromyalgia, Herpes, History of alcohol abuse, History of MRSA infection, History of seizure disorder, History of syncope, Hx of blood clots, Hyperlipidemia, Hypotension, Hypothyroidism, MDRO (multiple drug resistant organisms) resistance, Medtronic dual pacer , medtronic loop recorder, Medtronic single pacer /AAI paced , Metabolic syndrome, Metabolic syndrome, Migraine headache, Mild pulmonary hypertension ([facility] Obesity, Class II, BMI 35.0-39.9, with comorbidity (see actual BMI), PAC (premature atrial contraction), Palpitations, Personal history of methicillin resistant Staphylococcus aureus, PVC's, S/P ablation operation for arrhythmia, Scoliosis, Seizures ([address] Sinus bradycardia, Superficial phlebitis, Tachycardia, Type II or unspecified type diabetes mellitus without mention of complication, not stated as uncontrolled, and Unstable angina ([facility] Social History  Snow  reports that she has been smoking cigarettes. She has a 10.00 pack-year smoking history. She has never used smokeless tobacco. She reports current drug use. Drug: Marijuana Shellye Burkitt). She reports that she does not drink alcohol. Family History  Snow family history includes COPD in her mother; Diabetes in her mother; Heart Disease in her mother. Past Surgical History   Past Surgical History:   Procedure Laterality Date    ABDOMEN SURGERY      ABLATION OF DYSRHYTHMIC FOCUS  2010    ATRIAL ABLATION SURGERY      BACK SURGERY  2010    cyst removed    BLADDER REPAIR      CARDIAC CATHETERIZATION      CARDIAC SURGERY  5 -8 -12    Reveal medtronic loop recorder insert    CARDIOVASCULAR STRESS TEST  3 30 2011    The gated SPECT demonstrated normal LV function, demonstrated normal thickening, normal contractility, normal motion, EF 53%. No evidence of stress-induced ischemia is noted on this study. There is evidence of bowel interference with the inferior counts and also perfusion defect seen in the anterior apical wall both at rest and stress.      CARDIOVASCULAR STRESS TEST 9 15 2003    There is no scintigraphic evidence of inducible myocardial ischemia or infarction. LV systolic function is normal. Exercise capacity is mildly reduced. CYST REMOVAL      L5-S1 facet cyst excision resulting in postoperative complications including hematoma and infection requiring I&D. DILATION AND CURETTAGE OF UTERUS  1995    HYSTERECTOMY, TOTAL ABDOMINAL (CERVIX REMOVED)  1-12-16    LAPAROSCOPY  01/02/2013    Diagnostic Laparoscopy, D & C Novasure, Hysteroscopy, Endometrial Ablation, AP Repair    OTHER SURGICAL HISTORY  04/27/2017    loop recorder placed by Dr Indu Hill at \[facility]\""  05/30/2018    [employer]    THYROIDECTOMY  2608    Follicular thyroid cancer, S/P total thyroidectomy in 2005, radioactive iodine ablation in 2007. TRANSTHORACIC ECHOCARDIOGRAM  3 30 2011    Size was normal. systolic function was normal. EF was estimated in the range of 55-65%. There were no regional wall motion abnormalities. Wall thickness was normal. Doppler parameters were consistent with abnormal LV relaxation (grade 1 diastolic dysfunction.)     TUBAL LIGATION  1999       Subjective:     REVIEW OF SYSTEMS  Constitutional: denies sweats, chills and fever  HENT: denies  congestion, sinus pressure, sneezing and sore throat. Eyes: denies  pain, discharge, redness and itching. Respiratory: denies apnea, cough  Gastrointestinal: denies blood in stool, constipation, diarrhea   Endocrine: denies cold intolerance, heat intolerance, polydipsia. Genitourinary: denies dysuria, enuresis, flank pain and hematuria. Musculoskeletal: denies arthralgias, joint swelling and neck pain. Neurological: denies numbness and headaches. Psychiatric/Behavioral: denies agitation, confusion, decreased concentration and dysphoric mood    All others reviewed and are negative.    Objective:     /68   Pulse 70   Ht 5' 5\" (1.651 m)   Wt 204 lb (92.5 kg)   LMP 12/19/2014   BMI 33.95 kg/m²     Wt Readings from Last 3 Encounters:   12/19/22 204 lb (92.5 kg)   10/07/22 216 lb (98 kg)   08/12/22 210 lb (95.3 kg)     BP Readings from Last 3 Encounters:   12/19/22 100/68   10/07/22 121/74   08/12/22 (!) 130/94       PHYSICAL EXAM  Constitutional: Oriented to person, place, and time. Appears well-developed and well-nourished. HENT:   Head: Normocephalic and atraumatic. Eyes: EOM are normal. Pupils are equal, round, and reactive to light. Neck: Normal range of motion. Neck supple. No JVD present. Cardiovascular: Normal rate , normal heart sounds and intact distal pulses. Pulmonary/Chest: Effort normal and breath sounds normal. No respiratory distress. No wheezes. No rales. Abdominal: Soft. Bowel sounds are normal. No distension. There is no tenderness. Musculoskeletal: Normal range of motion. No edema. Neurological: Alert and oriented to person, place, and time. No cranial nerve deficit. Coordination normal.   Skin: Skin is warm and dry. Psychiatric: Normal mood and affect.        Lab Results   Component Value Date/Time    CKTOTAL 219 07/03/2017 10:09 PM    CKTOTAL 91 04/08/2017 10:10 AM    CKTOTAL 64 09/04/2016 05:30 PM    CKMB 3.8 07/03/2017 10:09 PM    CKMBINDEX 1.7 07/03/2017 10:09 PM       Lab Results   Component Value Date/Time    WBC 6.3 03/23/2022 09:50 AM    RBC 4.59 03/23/2022 09:50 AM    RBC 4.04 04/30/2012 05:50 AM    HGB 14.0 03/23/2022 09:50 AM    HCT 42.3 03/23/2022 09:50 AM    MCV 92.2 03/23/2022 09:50 AM    MCH 30.5 03/23/2022 09:50 AM    MCHC 33.1 03/23/2022 09:50 AM    RDW 12.3 03/23/2022 09:50 AM     03/23/2022 09:50 AM    MPV 10.5 03/23/2022 09:50 AM       Lab Results   Component Value Date/Time     03/23/2022 09:50 AM    K 4.3 03/23/2022 09:50 AM    K 3.6 05/27/2018 01:47 AM     03/23/2022 09:50 AM    CO2 25 03/23/2022 09:50 AM    BUN 10 03/23/2022 09:50 AM    LABALBU 4.4 03/23/2022 09:50 AM    LABALBU 4.1 03/20/2012 09:00 PM    CREATININE 0.56 03/23/2022 09:50 AM CALCIUM 9.7 03/23/2022 09:50 AM    GFRAA >60 03/23/2022 09:50 AM    LABGLOM >60 03/23/2022 09:50 AM    LABGLOM >90 01/22/2019 01:36 PM    GLUCOSE 240 03/23/2022 09:50 AM    GLUCOSE 102 02/26/2020 11:56 PM       Lab Results   Component Value Date/Time    ALKPHOS 70 03/23/2022 09:50 AM    ALT 9 03/23/2022 09:50 AM    AST 13 03/23/2022 09:50 AM    PROT 7.0 03/23/2022 09:50 AM    BILITOT 0.49 03/23/2022 09:50 AM    BILIDIR 0.1 02/26/2020 11:56 PM    LABALBU 4.4 03/23/2022 09:50 AM    LABALBU 4.1 03/20/2012 09:00 PM       Lab Results   Component Value Date/Time    MG 1.7 02/26/2020 11:56 PM       Lab Results   Component Value Date    INR 1.07 01/22/2019    INR 1.09 05/26/2018    INR 1.06 09/04/2016    PROTIME 12.2 09/04/2016    PROTIME 11.1 12/06/2015         Lab Results   Component Value Date/Time    LABA1C 8.2 05/30/2019 02:24 PM    LABA1C 8.9 02/27/2019 02:05 PM       Lab Results   Component Value Date/Time    TRIG 80 03/23/2022 09:50 AM    HDL 46 03/23/2022 09:50 AM    LDLCALC 69 10/09/2018 04:31 PM       Lab Results   Component Value Date/Time    TSH 0.10 03/23/2022 09:50 AM         Testing Reviewed:      I haveindividually reviewed the below cardiac tests    EKG:    ECHO:   Results for orders placed during the hospital encounter of 11/14/19   ECHO Complete 2D W Doppler W Color    Narrative Transthoracic Echocardiography Report (TTE)     Demographics      Patient Name    [facility] Sourav Sultana Gender                Female      MR #            [id number]      Race                                                       Ethnicity      Account #       [de-identified]      Room Number      Accession       [id number]      Date of Study         11/14/2019   Number      Date of Birth   1975     Referring Physician   Marie Mendoza CNP      Age             40 year(s)     Sonographer           Noam Bah, [address] [place] [address] Interpreting          Betty Nova MD                                  Physician     Procedure    Type of Study      TTE procedure:ECHOCARDIOGRAM COMPLETE 2D W DOPPLER W COLOR. Procedure Date  Date: 11/14/2019 Start: 10:21 AM    Study Location: Echo Lab  Technical Quality: Adequate visualization    Indications:Shortness of breath and Chest pain. Additional Medical History:Smoker, Diabetic, Hypertension, hyperlipidemia,  Bradycardia, Hypothyroidism, Angina, Thyroid cancer, PVC's, SSS, SVT, Pacer. Patient Status: Routine    Height: 65 inches Weight: 207 pounds BSA: 2.01 m^2 BMI: 34.45 kg/m^2    BP: 112/62 mmHg    Allergies    - See Epic. Conclusions      Summary   Left ventricular size and systolic function is normal. Ejection fraction   was estimated at 55-60%. LV wall thickness is within normal limits and   there are no obvious wall motion abnormalities. Mild tricuspid regurgitation. Mild mitral regurgitation is present. Signature      ----------------------------------------------------------------   Electronically signed by Betty Nova MD (Interpreting   physician) on 11/14/2019 at 01:03 PM   ----------------------------------------------------------------      Findings      Mitral Valve   The mitral valve structure is normal with normal leaflet separation. DOPPLER: The transmitral velocity was within the normal range with no   evidence for mitral stenosis. Mild mitral regurgitation is present. Aortic Valve   The aortic valve appears trileaflet with normal thickness and leaflet   excursion. DOPPLER: Transaortic velocity was within the normal range with   no evidence of aortic stenosis. There was no evidence of aortic   regurgitation. Tricuspid Valve   The tricuspid valve structure is normal with normal leaflet separation. DOPPLER: There is no evidence of tricuspid stenosis. Mild tricuspid regurgitation.       Pulmonic Valve   The pulmonic valve leaflets appear normal thickness, and normal cuspal   separation. DOPPLER: The transpulmonic velocity was within the normal   range. No evidence for regurgitation. Left Atrium   Left atrial size is normal.      Left Ventricle   Left ventricular size and systolic function is normal. Ejection fraction   was estimated at 55-60%. LV wall thickness is within normal limits and   there are no obvious wall motion abnormalities. Right Atrium   Right atrial size was normal.      Right Ventricle   The right ventricular size appears normal with normal systolic function   and wall thickness. Pericardial Effusion   The pericardium appears normal with no evidence of a pericardial effusion. Pleural Effusion   No evidence of pleural effusion. Aorta / Great Vessels   -Aortic root dimension within normal limits. -IVC size is within normal limits with normal respiratory phasic changes.      M-Mode/2D Measurements & Calculations      LV Diastolic   LV Systolic Dimension:    AV Cusp Separation: 2.3 cmLA   Dimension: 4.9 3.5 cm                    Dimension: 3.7 cmAO Root   cm             LV Volume Diastolic: 000  Dimension: 3.3 cmLA Area: 21.3   LV FS:28.6 %   ml                        cm^2   LV PW          LV Volume Systolic: 39.8   Diastolic: 0.9 ml   cm             LV EDV/LV EDV Index: 113   Septum         ml/56 m^2LV ESV/LV ESV    RV Diastolic Dimension: 3.6 cm   Diastolic: 0.9 Index: 19.5 ml/25 m^2   cm             EF Calculated: 55 %       LA/Aorta: 1.12                                            Ascending Aorta: 3.6 cm                                            LA volume/Index: 62.9 ml /31m^2     Doppler Measurements & Calculations      MV Peak E-Wave: 60.1 cm/s AV Peak Velocity: 109  LVOT Peak Velocity: 71.3   MV Peak A-Wave: 50.4 cm/s cm/s                   cm/s   MV E/A Ratio: 1.19        AV Peak Gradient: 4.75 LVOT Peak Gradient: 2   MV Peak Gradient: 1.44    mmHg                   mmHg   mmHg TV Peak E-Wave: 63.5 cm/s   MV Deceleration Time: 401                        TV Peak A-Wave: 51.4 cm/s   msec                             IVRT: 63 msec          TV Peak Gradient: 1.61                                                    mmHg   MV E' Septal Velocity:                           TR Velocity:183 cm/s   6.6 cm/s                  AV DVI (Vmax):0.65     TR Gradient:13.4 mmHg   MV A' Septal Velocity:                           PV Peak Velocity: 53.4   8.8 cm/s                                         cm/s   MV E' Lateral Velocity:                          PV Peak Gradient: 1.14   8.3 cm/s                                         mmHg   MV A' Lateral Velocity:   6.2 cm/s   E/E' septal: 9.11                                RI ED Velocity: 109 cm/s   E/E' lateral: 7.24   MR Velocity: 341 cm/s     [email or web address] [id number]       STRESS:    CATH:    Assessment/Plan       Diagnosis Orders   1. Bradycardia  EKG 12 lead          s/p PPM  On 5/30/18 (Single atrial PPM)  Hx AVNRT ablation  DM   Hypotension  COPD  Smoker (1ppd)     EKG today shows no significant findings  Reviewed prior workup with patient  Device interrogated and no events  Patient is diabetic   Reviewed recent Echo, stress and BRIDGETTE  Advised to stop smoking because smoking increases risk of heart disease, morbidity, mortality and end organ damage. The patient is asked to make an attempt to improve diet and exercise patterns to aid in medical management of this problem. Advised patient to call office or seek immediate medical attention if there is any new onset of  any chest pain, sob, palpitations, lightheadedness, dizziness, orthopnea, PND or pedal edema. Advised to stop smoking because smoking increases risk of heart disease, morbidity, mortality and end organ damage.      Thank youfor allowing me to participate in the care of this patient. Please do not hesitate to contact me for any further questions. Return in about 1 year (around 12/19/2023), or if symptoms worsen or fail to improve, for Review testing, Regular follow up.        Electronically signed by Ramesh Wright MD [facility]  12/19/2022 at 2:58 PM

## 2023-04-27 ENCOUNTER — PROCEDURE VISIT (OUTPATIENT)
Dept: CARDIOLOGY CLINIC | Age: 48
End: 2023-04-27

## 2023-04-27 DIAGNOSIS — Z95.0 PACEMAKER: Primary | ICD-10-CM

## 2023-08-01 ENCOUNTER — TELEPHONE (OUTPATIENT)
Dept: CARDIOLOGY CLINIC | Age: 48
End: 2023-08-01

## 2023-08-08 ENCOUNTER — TELEPHONE (OUTPATIENT)
Dept: CARDIOLOGY CLINIC | Age: 48
End: 2023-08-08

## 2023-08-17 ENCOUNTER — PROCEDURE VISIT (OUTPATIENT)
Dept: CARDIOLOGY CLINIC | Age: 48
End: 2023-08-17
Payer: MEDICAID

## 2023-08-17 DIAGNOSIS — Z95.0 PACEMAKER: Primary | ICD-10-CM

## 2023-08-17 PROCEDURE — 93296 REM INTERROG EVL PM/IDS: CPT | Performed by: INTERNAL MEDICINE

## 2023-08-17 PROCEDURE — 93294 REM INTERROG EVL PM/LDLS PM: CPT | Performed by: INTERNAL MEDICINE

## 2023-08-17 NOTE — PROGRESS NOTES
[facility]     4 years on device   At imped 362  P waves 1-2.8  Threshold not measured   28.8% paced

## 2024-03-22 ENCOUNTER — TELEPHONE (OUTPATIENT)
Dept: CARDIOLOGY CLINIC | Age: 49
End: 2024-03-22

## 2024-04-09 ENCOUNTER — TELEPHONE (OUTPATIENT)
Dept: CARDIOLOGY CLINIC | Age: 49
End: 2024-04-09

## 2024-04-09 PROCEDURE — 93294 REM INTERROG EVL PM/LDLS PM: CPT | Performed by: INTERNAL MEDICINE

## 2024-04-09 PROCEDURE — 93296 REM INTERROG EVL PM/IDS: CPT | Performed by: INTERNAL MEDICINE

## 2024-04-10 ENCOUNTER — PROCEDURE VISIT (OUTPATIENT)
Dept: CARDIOLOGY CLINIC | Age: 49
End: 2024-04-10
Payer: MEDICAID

## 2024-04-10 DIAGNOSIS — Z95.0 PACEMAKER: Primary | ICD-10-CM

## 2024-04-10 NOTE — PROGRESS NOTES
Dr groves pt   Medtronic dual pacer remote   Battery 2-5 yrs remaining    Aai 60    P waves 1.4 to >2.8    Atrial impedence 400  No threshold obtained per the device   A paced 15.2%

## 2024-07-02 ENCOUNTER — TELEPHONE (OUTPATIENT)
Dept: CARDIOLOGY CLINIC | Age: 49
End: 2024-07-02

## 2024-07-11 ENCOUNTER — TELEPHONE (OUTPATIENT)
Dept: CARDIOLOGY CLINIC | Age: 49
End: 2024-07-11

## 2024-07-15 PROCEDURE — 93294 REM INTERROG EVL PM/LDLS PM: CPT | Performed by: INTERNAL MEDICINE

## 2024-07-15 PROCEDURE — 93296 REM INTERROG EVL PM/IDS: CPT | Performed by: INTERNAL MEDICINE

## 2024-07-16 ENCOUNTER — PROCEDURE VISIT (OUTPATIENT)
Dept: CARDIOLOGY CLINIC | Age: 49
End: 2024-07-16
Payer: MEDICAID

## 2024-07-16 DIAGNOSIS — Z95.0 PACEMAKER: Primary | ICD-10-CM

## 2024-07-16 NOTE — PROGRESS NOTES
[facility] medtronic single AAI pacer   Nallu pt     3 years on device   Atrial imped 412  Adaptive threshold is off   P waves 1.4-2.8  13.7% paced   SVt

## 2024-11-05 ENCOUNTER — TELEPHONE (OUTPATIENT)
Dept: CARDIOLOGY CLINIC | Age: 49
End: 2024-11-05

## 2024-11-15 PROCEDURE — 93294 REM INTERROG EVL PM/LDLS PM: CPT | Performed by: INTERNAL MEDICINE

## 2024-11-15 PROCEDURE — 93296 REM INTERROG EVL PM/IDS: CPT | Performed by: INTERNAL MEDICINE

## 2024-12-16 ENCOUNTER — OFFICE VISIT (OUTPATIENT)
Dept: CARDIOLOGY CLINIC | Age: 49
End: 2024-12-16
Payer: MEDICAID

## 2024-12-16 ENCOUNTER — NURSE ONLY (OUTPATIENT)
Dept: CARDIOLOGY CLINIC | Age: 49
End: 2024-12-16

## 2024-12-16 VITALS
HEIGHT: 65 IN | BODY MASS INDEX: 37.32 KG/M2 | WEIGHT: 224 LBS | HEART RATE: 68 BPM | DIASTOLIC BLOOD PRESSURE: 78 MMHG | SYSTOLIC BLOOD PRESSURE: 130 MMHG

## 2024-12-16 DIAGNOSIS — I47.10 SVT (SUPRAVENTRICULAR TACHYCARDIA) (HCC): Primary | ICD-10-CM

## 2024-12-16 DIAGNOSIS — Z95.0 PACEMAKER: Primary | ICD-10-CM

## 2024-12-16 PROCEDURE — 93000 ELECTROCARDIOGRAM COMPLETE: CPT | Performed by: INTERNAL MEDICINE

## 2024-12-16 PROCEDURE — 99214 OFFICE O/P EST MOD 30 MIN: CPT | Performed by: INTERNAL MEDICINE

## 2024-12-16 RX ORDER — BUSPIRONE HYDROCHLORIDE 10 MG/1
10 TABLET ORAL 3 TIMES DAILY
COMMUNITY

## 2024-12-16 RX ORDER — OXCARBAZEPINE 150 MG/1
150 TABLET, FILM COATED ORAL 2 TIMES DAILY
COMMUNITY

## 2024-12-16 RX ORDER — INSULIN GLARGINE 100 [IU]/ML
INJECTION, SOLUTION SUBCUTANEOUS NIGHTLY
COMMUNITY

## 2024-12-16 RX ORDER — HYDROXYZINE PAMOATE 25 MG/1
25 CAPSULE ORAL 3 TIMES DAILY PRN
COMMUNITY

## 2024-12-16 NOTE — PROGRESS NOTES
[facility] PROFESSIONAL [facility] CARDIOLOGY  [address]  [address]  [facility] [address]  Dept: [phone]  Dept Fax: [phone]  Loc: [phone]    Visit Date: 12/16/2024    Ms. Owens is a 49 y.o. female  who presented for:  Chief Complaint   Patient presents with    Follow-up     1 year follow up.       HPI:   48 yo F c hx of  hx AVNRT ablation in the past, s/p PPM(Single atrial PPM), DM,  HLD, COPD and Seizure disorder is here for a follow up.  Had PPM placed on 5/30/18 by Dr. Morales.    occasionally feels chest pains, sharp, this has been chronic, aggravating with food intake. On prilosec.  Has been helping.        Current Outpatient Medications:     insulin glargine (LANTUS) 100 UNIT/ML injection vial, Inject into the skin nightly, Disp: , Rfl:     busPIRone (BUSPAR) 10 MG tablet, Take 1 tablet by mouth 3 times daily, Disp: , Rfl:     hydrOXYzine pamoate (VISTARIL) 25 MG capsule, Take 1 capsule by mouth 3 times daily as needed for Itching, Disp: , Rfl:     OXcarbazepine (TRILEPTAL) 150 MG tablet, Take 1 tablet by mouth 2 times daily, Disp: , Rfl:     metFORMIN (GLUCOPHAGE-XR) 500 MG extended release tablet, , Disp: , Rfl:     levothyroxine (SYNTHROID) 150 MCG tablet, Take 1 tablet by mouth daily (Patient taking differently: Take 200 mcg by mouth daily), Disp: 30 tablet, Rfl: 5    acetaminophen (TYLENOL) 500 MG tablet, Take 1 tablet by mouth 4 times daily as needed for Pain, Disp: 120 tablet, Rfl: 0    albuterol sulfate HFA (PROAIR HFA) 108 (90 BASE) MCG/ACT inhaler, Inhale 2 puffs into the lungs every 4 hours as needed for Wheezing, Disp: 1 Inhaler, Rfl: 1    NICORETTE MINI 4 MG lozenge, , Disp: , Rfl:     Past Medical History  Hope  has a past medical history of Anxiety, Arrhythmia, Arthritis, Atypical chest pain, Bradycardia, CAD (coronary artery disease), Cancer (HCC), Chest pain, CHF (congestive heart failure) ([facility]), Cholelithiasis, COPD (chronic obstructive pulmonary

## 2024-12-16 NOTE — PROGRESS NOTES
1 year follow up.    EKG done today.    Reports intermittent chest pain for a couple months and palpitations.    Denies dizziness, shortness of breath, and edema.

## 2025-02-14 ENCOUNTER — TELEPHONE (OUTPATIENT)
Dept: CARDIOLOGY CLINIC | Age: 50
End: 2025-02-14

## 2025-02-19 PROCEDURE — 93294 REM INTERROG EVL PM/LDLS PM: CPT | Performed by: INTERNAL MEDICINE

## 2025-02-19 PROCEDURE — 93296 REM INTERROG EVL PM/IDS: CPT | Performed by: INTERNAL MEDICINE

## 2025-06-13 ENCOUNTER — TELEPHONE (OUTPATIENT)
Dept: CARDIOLOGY CLINIC | Age: 50
End: 2025-06-13

## 2025-06-26 PROCEDURE — 93294 REM INTERROG EVL PM/LDLS PM: CPT | Performed by: INTERNAL MEDICINE

## 2025-06-26 PROCEDURE — 93296 REM INTERROG EVL PM/IDS: CPT | Performed by: INTERNAL MEDICINE
